# Patient Record
Sex: FEMALE | Race: WHITE | Employment: OTHER | URBAN - METROPOLITAN AREA
[De-identification: names, ages, dates, MRNs, and addresses within clinical notes are randomized per-mention and may not be internally consistent; named-entity substitution may affect disease eponyms.]

---

## 2017-04-12 DIAGNOSIS — E53.8 B12 DEFICIENCY: ICD-10-CM

## 2017-04-12 DIAGNOSIS — E78.5 HYPERLIPIDEMIA, UNSPECIFIED HYPERLIPIDEMIA TYPE: ICD-10-CM

## 2017-04-12 DIAGNOSIS — E03.9 HYPOTHYROIDISM, UNSPECIFIED TYPE: ICD-10-CM

## 2017-04-12 DIAGNOSIS — I10 ESSENTIAL HYPERTENSION, BENIGN: ICD-10-CM

## 2017-04-12 LAB
A/G RATIO: 1.7 (ref 1.1–2.2)
ALBUMIN SERPL-MCNC: 4.1 G/DL (ref 3.4–5)
ALP BLD-CCNC: 96 U/L (ref 40–129)
ALT SERPL-CCNC: 34 U/L (ref 10–40)
ANION GAP SERPL CALCULATED.3IONS-SCNC: 13 MMOL/L (ref 3–16)
AST SERPL-CCNC: 21 U/L (ref 15–37)
BILIRUB SERPL-MCNC: <0.2 MG/DL (ref 0–1)
BUN BLDV-MCNC: 27 MG/DL (ref 7–20)
CALCIUM SERPL-MCNC: 9.5 MG/DL (ref 8.3–10.6)
CHLORIDE BLD-SCNC: 106 MMOL/L (ref 99–110)
CHOLESTEROL, TOTAL: 199 MG/DL (ref 0–199)
CO2: 27 MMOL/L (ref 21–32)
CREAT SERPL-MCNC: 0.9 MG/DL (ref 0.6–1.2)
GFR AFRICAN AMERICAN: >60
GFR NON-AFRICAN AMERICAN: >60
GLOBULIN: 2.4 G/DL
GLUCOSE BLD-MCNC: 99 MG/DL (ref 70–99)
HDLC SERPL-MCNC: 53 MG/DL (ref 40–60)
LDL CHOLESTEROL CALCULATED: 118 MG/DL
POTASSIUM SERPL-SCNC: 4.5 MMOL/L (ref 3.5–5.1)
SODIUM BLD-SCNC: 146 MMOL/L (ref 136–145)
T4 FREE: 1.4 NG/DL (ref 0.9–1.8)
TOTAL PROTEIN: 6.5 G/DL (ref 6.4–8.2)
TRIGL SERPL-MCNC: 141 MG/DL (ref 0–150)
TSH REFLEX: 4.39 UIU/ML (ref 0.27–4.2)
VITAMIN B-12: 462 PG/ML (ref 211–911)
VLDLC SERPL CALC-MCNC: 28 MG/DL

## 2017-04-18 ENCOUNTER — OFFICE VISIT (OUTPATIENT)
Dept: INTERNAL MEDICINE CLINIC | Age: 72
End: 2017-04-18

## 2017-04-18 VITALS
OXYGEN SATURATION: 98 % | HEART RATE: 66 BPM | BODY MASS INDEX: 44.35 KG/M2 | HEIGHT: 62 IN | SYSTOLIC BLOOD PRESSURE: 130 MMHG | DIASTOLIC BLOOD PRESSURE: 80 MMHG | WEIGHT: 241 LBS

## 2017-04-18 DIAGNOSIS — E78.5 HYPERLIPIDEMIA, UNSPECIFIED HYPERLIPIDEMIA TYPE: ICD-10-CM

## 2017-04-18 DIAGNOSIS — E03.9 HYPOTHYROIDISM, UNSPECIFIED TYPE: ICD-10-CM

## 2017-04-18 DIAGNOSIS — E53.8 B12 DEFICIENCY: ICD-10-CM

## 2017-04-18 DIAGNOSIS — I10 ESSENTIAL HYPERTENSION, BENIGN: Primary | ICD-10-CM

## 2017-04-18 DIAGNOSIS — R53.83 FATIGUE, UNSPECIFIED TYPE: ICD-10-CM

## 2017-04-18 PROCEDURE — 3017F COLORECTAL CA SCREEN DOC REV: CPT | Performed by: INTERNAL MEDICINE

## 2017-04-18 PROCEDURE — G8399 PT W/DXA RESULTS DOCUMENT: HCPCS | Performed by: INTERNAL MEDICINE

## 2017-04-18 PROCEDURE — 3014F SCREEN MAMMO DOC REV: CPT | Performed by: INTERNAL MEDICINE

## 2017-04-18 PROCEDURE — 4040F PNEUMOC VAC/ADMIN/RCVD: CPT | Performed by: INTERNAL MEDICINE

## 2017-04-18 PROCEDURE — 1123F ACP DISCUSS/DSCN MKR DOCD: CPT | Performed by: INTERNAL MEDICINE

## 2017-04-18 PROCEDURE — 99214 OFFICE O/P EST MOD 30 MIN: CPT | Performed by: INTERNAL MEDICINE

## 2017-04-18 PROCEDURE — G8417 CALC BMI ABV UP PARAM F/U: HCPCS | Performed by: INTERNAL MEDICINE

## 2017-04-18 PROCEDURE — 1090F PRES/ABSN URINE INCON ASSESS: CPT | Performed by: INTERNAL MEDICINE

## 2017-04-18 PROCEDURE — G8427 DOCREV CUR MEDS BY ELIG CLIN: HCPCS | Performed by: INTERNAL MEDICINE

## 2017-04-18 PROCEDURE — 1036F TOBACCO NON-USER: CPT | Performed by: INTERNAL MEDICINE

## 2017-04-18 RX ORDER — LEVOTHYROXINE SODIUM 0.15 MG/1
150 TABLET ORAL DAILY
COMMUNITY
End: 2017-04-18 | Stop reason: SDUPTHER

## 2017-04-18 RX ORDER — LEVOTHYROXINE SODIUM 0.15 MG/1
150 TABLET ORAL DAILY
Qty: 30 TABLET | Refills: 2 | Status: SHIPPED | OUTPATIENT
Start: 2017-04-18 | End: 2017-04-18 | Stop reason: SDUPTHER

## 2017-04-18 ASSESSMENT — ENCOUNTER SYMPTOMS
RESPIRATORY NEGATIVE: 1
EYES NEGATIVE: 1

## 2017-04-18 ASSESSMENT — PATIENT HEALTH QUESTIONNAIRE - PHQ9
SUM OF ALL RESPONSES TO PHQ QUESTIONS 1-9: 0
1. LITTLE INTEREST OR PLEASURE IN DOING THINGS: 0
SUM OF ALL RESPONSES TO PHQ9 QUESTIONS 1 & 2: 0
2. FEELING DOWN, DEPRESSED OR HOPELESS: 0

## 2017-06-06 RX ORDER — EZETIMIBE 10 MG/1
TABLET ORAL
Qty: 90 TABLET | Refills: 3 | Status: SHIPPED | OUTPATIENT
Start: 2017-06-06 | End: 2018-06-02 | Stop reason: SDUPTHER

## 2017-06-20 DIAGNOSIS — I10 ESSENTIAL HYPERTENSION, BENIGN: ICD-10-CM

## 2017-06-20 DIAGNOSIS — E03.9 HYPOTHYROIDISM, UNSPECIFIED TYPE: ICD-10-CM

## 2017-06-20 LAB
A/G RATIO: 1.7 (ref 1.1–2.2)
ALBUMIN SERPL-MCNC: 4 G/DL (ref 3.4–5)
ALP BLD-CCNC: 100 U/L (ref 40–129)
ALT SERPL-CCNC: 18 U/L (ref 10–40)
ANION GAP SERPL CALCULATED.3IONS-SCNC: 14 MMOL/L (ref 3–16)
AST SERPL-CCNC: 12 U/L (ref 15–37)
BILIRUB SERPL-MCNC: <0.2 MG/DL (ref 0–1)
BUN BLDV-MCNC: 31 MG/DL (ref 7–20)
CALCIUM SERPL-MCNC: 9 MG/DL (ref 8.3–10.6)
CHLORIDE BLD-SCNC: 106 MMOL/L (ref 99–110)
CO2: 24 MMOL/L (ref 21–32)
CREAT SERPL-MCNC: 1.1 MG/DL (ref 0.6–1.2)
GFR AFRICAN AMERICAN: 59
GFR NON-AFRICAN AMERICAN: 49
GLOBULIN: 2.4 G/DL
GLUCOSE BLD-MCNC: 100 MG/DL (ref 70–99)
POTASSIUM SERPL-SCNC: 4.4 MMOL/L (ref 3.5–5.1)
SODIUM BLD-SCNC: 144 MMOL/L (ref 136–145)
TOTAL PROTEIN: 6.4 G/DL (ref 6.4–8.2)
TSH REFLEX: 0.89 UIU/ML (ref 0.27–4.2)

## 2017-07-25 ENCOUNTER — OFFICE VISIT (OUTPATIENT)
Dept: INTERNAL MEDICINE CLINIC | Age: 72
End: 2017-07-25

## 2017-07-25 VITALS
HEIGHT: 62 IN | BODY MASS INDEX: 43.54 KG/M2 | WEIGHT: 236.6 LBS | SYSTOLIC BLOOD PRESSURE: 152 MMHG | DIASTOLIC BLOOD PRESSURE: 92 MMHG | OXYGEN SATURATION: 98 % | HEART RATE: 69 BPM

## 2017-07-25 DIAGNOSIS — R42 DIZZY: Primary | ICD-10-CM

## 2017-07-25 DIAGNOSIS — N39.0 URINARY TRACT INFECTION WITHOUT HEMATURIA, SITE UNSPECIFIED: ICD-10-CM

## 2017-07-25 DIAGNOSIS — I10 ESSENTIAL HYPERTENSION, BENIGN: ICD-10-CM

## 2017-07-25 DIAGNOSIS — R35.0 FREQUENCY OF URINATION: ICD-10-CM

## 2017-07-25 LAB
BILIRUBIN, POC: NORMAL
BLOOD URINE, POC: NORMAL
CLARITY, POC: NORMAL
COLOR, POC: NORMAL
GLUCOSE URINE, POC: NORMAL
KETONES, POC: NORMAL
LEUKOCYTE EST, POC: NORMAL
NITRITE, POC: NORMAL
PH, POC: 6
PROTEIN, POC: NORMAL
SPECIFIC GRAVITY, POC: 1.01
UROBILINOGEN, POC: NORMAL

## 2017-07-25 PROCEDURE — 1123F ACP DISCUSS/DSCN MKR DOCD: CPT | Performed by: INTERNAL MEDICINE

## 2017-07-25 PROCEDURE — 1090F PRES/ABSN URINE INCON ASSESS: CPT | Performed by: INTERNAL MEDICINE

## 2017-07-25 PROCEDURE — G8399 PT W/DXA RESULTS DOCUMENT: HCPCS | Performed by: INTERNAL MEDICINE

## 2017-07-25 PROCEDURE — G8428 CUR MEDS NOT DOCUMENT: HCPCS | Performed by: INTERNAL MEDICINE

## 2017-07-25 PROCEDURE — 3014F SCREEN MAMMO DOC REV: CPT | Performed by: INTERNAL MEDICINE

## 2017-07-25 PROCEDURE — 81002 URINALYSIS NONAUTO W/O SCOPE: CPT | Performed by: INTERNAL MEDICINE

## 2017-07-25 PROCEDURE — 99214 OFFICE O/P EST MOD 30 MIN: CPT | Performed by: INTERNAL MEDICINE

## 2017-07-25 PROCEDURE — G8417 CALC BMI ABV UP PARAM F/U: HCPCS | Performed by: INTERNAL MEDICINE

## 2017-07-25 PROCEDURE — 4040F PNEUMOC VAC/ADMIN/RCVD: CPT | Performed by: INTERNAL MEDICINE

## 2017-07-25 PROCEDURE — 3017F COLORECTAL CA SCREEN DOC REV: CPT | Performed by: INTERNAL MEDICINE

## 2017-07-25 PROCEDURE — 1036F TOBACCO NON-USER: CPT | Performed by: INTERNAL MEDICINE

## 2017-07-25 RX ORDER — DIAZEPAM 2 MG/1
TABLET ORAL
Qty: 5 TABLET | Refills: 0 | Status: SHIPPED | OUTPATIENT
Start: 2017-07-25 | End: 2017-08-15

## 2017-07-25 RX ORDER — NITROFURANTOIN 25; 75 MG/1; MG/1
100 CAPSULE ORAL 2 TIMES DAILY
Qty: 10 CAPSULE | Refills: 0 | Status: SHIPPED | OUTPATIENT
Start: 2017-07-25 | End: 2017-07-30

## 2017-07-25 ASSESSMENT — ENCOUNTER SYMPTOMS
RESPIRATORY NEGATIVE: 1
EYES NEGATIVE: 1

## 2017-07-26 ENCOUNTER — TELEPHONE (OUTPATIENT)
Dept: INTERNAL MEDICINE CLINIC | Age: 72
End: 2017-07-26

## 2017-07-27 ENCOUNTER — TELEPHONE (OUTPATIENT)
Dept: INTERNAL MEDICINE CLINIC | Age: 72
End: 2017-07-27

## 2017-07-27 LAB
ORGANISM: ABNORMAL
URINE CULTURE, ROUTINE: ABNORMAL

## 2017-08-10 DIAGNOSIS — E03.9 HYPOTHYROIDISM, UNSPECIFIED TYPE: ICD-10-CM

## 2017-08-10 DIAGNOSIS — I10 ESSENTIAL HYPERTENSION, BENIGN: Primary | ICD-10-CM

## 2017-08-10 DIAGNOSIS — I10 ESSENTIAL HYPERTENSION, BENIGN: ICD-10-CM

## 2017-08-10 LAB
A/G RATIO: 1.5 (ref 1.1–2.2)
ALBUMIN SERPL-MCNC: 4 G/DL (ref 3.4–5)
ALP BLD-CCNC: 105 U/L (ref 40–129)
ALT SERPL-CCNC: 18 U/L (ref 10–40)
ANION GAP SERPL CALCULATED.3IONS-SCNC: 14 MMOL/L (ref 3–16)
AST SERPL-CCNC: 12 U/L (ref 15–37)
BILIRUB SERPL-MCNC: <0.2 MG/DL (ref 0–1)
BUN BLDV-MCNC: 23 MG/DL (ref 7–20)
CALCIUM SERPL-MCNC: 9.6 MG/DL (ref 8.3–10.6)
CHLORIDE BLD-SCNC: 98 MMOL/L (ref 99–110)
CO2: 26 MMOL/L (ref 21–32)
CREAT SERPL-MCNC: 0.9 MG/DL (ref 0.6–1.2)
GFR AFRICAN AMERICAN: >60
GFR NON-AFRICAN AMERICAN: >60
GLOBULIN: 2.6 G/DL
GLUCOSE BLD-MCNC: 104 MG/DL (ref 70–99)
POTASSIUM SERPL-SCNC: 4.1 MMOL/L (ref 3.5–5.1)
SODIUM BLD-SCNC: 138 MMOL/L (ref 136–145)
TOTAL PROTEIN: 6.6 G/DL (ref 6.4–8.2)
TSH REFLEX: 0.7 UIU/ML (ref 0.27–4.2)

## 2017-08-11 ENCOUNTER — TELEPHONE (OUTPATIENT)
Dept: INTERNAL MEDICINE CLINIC | Age: 72
End: 2017-08-11

## 2017-08-15 ENCOUNTER — OFFICE VISIT (OUTPATIENT)
Dept: INTERNAL MEDICINE CLINIC | Age: 72
End: 2017-08-15

## 2017-08-15 VITALS
HEART RATE: 64 BPM | TEMPERATURE: 98.4 F | BODY MASS INDEX: 44.63 KG/M2 | WEIGHT: 236.4 LBS | SYSTOLIC BLOOD PRESSURE: 142 MMHG | DIASTOLIC BLOOD PRESSURE: 72 MMHG | RESPIRATION RATE: 16 BRPM | HEIGHT: 61 IN | OXYGEN SATURATION: 96 %

## 2017-08-15 DIAGNOSIS — E78.5 HYPERLIPIDEMIA, UNSPECIFIED HYPERLIPIDEMIA TYPE: ICD-10-CM

## 2017-08-15 DIAGNOSIS — J45.21 MILD INTERMITTENT ASTHMA WITH ACUTE EXACERBATION: ICD-10-CM

## 2017-08-15 DIAGNOSIS — Z23 NEEDS FLU SHOT: ICD-10-CM

## 2017-08-15 DIAGNOSIS — E03.9 HYPOTHYROIDISM, UNSPECIFIED TYPE: ICD-10-CM

## 2017-08-15 DIAGNOSIS — I10 ESSENTIAL HYPERTENSION, BENIGN: Primary | ICD-10-CM

## 2017-08-15 DIAGNOSIS — E53.8 B12 DEFICIENCY: ICD-10-CM

## 2017-08-15 PROCEDURE — 1036F TOBACCO NON-USER: CPT | Performed by: INTERNAL MEDICINE

## 2017-08-15 PROCEDURE — G8417 CALC BMI ABV UP PARAM F/U: HCPCS | Performed by: INTERNAL MEDICINE

## 2017-08-15 PROCEDURE — G8399 PT W/DXA RESULTS DOCUMENT: HCPCS | Performed by: INTERNAL MEDICINE

## 2017-08-15 PROCEDURE — 3017F COLORECTAL CA SCREEN DOC REV: CPT | Performed by: INTERNAL MEDICINE

## 2017-08-15 PROCEDURE — 90662 IIV NO PRSV INCREASED AG IM: CPT | Performed by: INTERNAL MEDICINE

## 2017-08-15 PROCEDURE — 99214 OFFICE O/P EST MOD 30 MIN: CPT | Performed by: INTERNAL MEDICINE

## 2017-08-15 PROCEDURE — 4040F PNEUMOC VAC/ADMIN/RCVD: CPT | Performed by: INTERNAL MEDICINE

## 2017-08-15 PROCEDURE — 1123F ACP DISCUSS/DSCN MKR DOCD: CPT | Performed by: INTERNAL MEDICINE

## 2017-08-15 PROCEDURE — 1090F PRES/ABSN URINE INCON ASSESS: CPT | Performed by: INTERNAL MEDICINE

## 2017-08-15 PROCEDURE — G8427 DOCREV CUR MEDS BY ELIG CLIN: HCPCS | Performed by: INTERNAL MEDICINE

## 2017-08-15 PROCEDURE — 3014F SCREEN MAMMO DOC REV: CPT | Performed by: INTERNAL MEDICINE

## 2017-08-15 PROCEDURE — G0008 ADMIN INFLUENZA VIRUS VAC: HCPCS | Performed by: INTERNAL MEDICINE

## 2017-08-15 ASSESSMENT — ENCOUNTER SYMPTOMS
EYES NEGATIVE: 1
RESPIRATORY NEGATIVE: 1

## 2017-09-27 ENCOUNTER — HOSPITAL ENCOUNTER (OUTPATIENT)
Dept: WOMENS IMAGING | Age: 72
Discharge: HOME | End: 2017-09-27
Attending: OBSTETRICS & GYNECOLOGY | Admitting: OBSTETRICS & GYNECOLOGY

## 2017-09-27 DIAGNOSIS — Z12.31 VISIT FOR SCREENING MAMMOGRAM: ICD-10-CM

## 2017-12-06 ENCOUNTER — OFFICE VISIT (OUTPATIENT)
Dept: INTERNAL MEDICINE CLINIC | Age: 72
End: 2017-12-06

## 2017-12-06 VITALS
DIASTOLIC BLOOD PRESSURE: 96 MMHG | TEMPERATURE: 97.8 F | SYSTOLIC BLOOD PRESSURE: 144 MMHG | HEART RATE: 66 BPM | BODY MASS INDEX: 45.27 KG/M2 | OXYGEN SATURATION: 98 % | WEIGHT: 239.8 LBS | HEIGHT: 61 IN

## 2017-12-06 DIAGNOSIS — I10 ESSENTIAL HYPERTENSION, BENIGN: ICD-10-CM

## 2017-12-06 DIAGNOSIS — E03.9 HYPOTHYROIDISM, UNSPECIFIED TYPE: ICD-10-CM

## 2017-12-06 DIAGNOSIS — J40 BRONCHITIS: Primary | ICD-10-CM

## 2017-12-06 PROCEDURE — 4040F PNEUMOC VAC/ADMIN/RCVD: CPT | Performed by: INTERNAL MEDICINE

## 2017-12-06 PROCEDURE — 1036F TOBACCO NON-USER: CPT | Performed by: INTERNAL MEDICINE

## 2017-12-06 PROCEDURE — 99213 OFFICE O/P EST LOW 20 MIN: CPT | Performed by: INTERNAL MEDICINE

## 2017-12-06 PROCEDURE — 3014F SCREEN MAMMO DOC REV: CPT | Performed by: INTERNAL MEDICINE

## 2017-12-06 PROCEDURE — 1090F PRES/ABSN URINE INCON ASSESS: CPT | Performed by: INTERNAL MEDICINE

## 2017-12-06 PROCEDURE — G8399 PT W/DXA RESULTS DOCUMENT: HCPCS | Performed by: INTERNAL MEDICINE

## 2017-12-06 PROCEDURE — G8427 DOCREV CUR MEDS BY ELIG CLIN: HCPCS | Performed by: INTERNAL MEDICINE

## 2017-12-06 PROCEDURE — 3017F COLORECTAL CA SCREEN DOC REV: CPT | Performed by: INTERNAL MEDICINE

## 2017-12-06 PROCEDURE — G8417 CALC BMI ABV UP PARAM F/U: HCPCS | Performed by: INTERNAL MEDICINE

## 2017-12-06 PROCEDURE — G8484 FLU IMMUNIZE NO ADMIN: HCPCS | Performed by: INTERNAL MEDICINE

## 2017-12-06 PROCEDURE — 1123F ACP DISCUSS/DSCN MKR DOCD: CPT | Performed by: INTERNAL MEDICINE

## 2017-12-06 RX ORDER — CEFUROXIME AXETIL 250 MG/1
250 TABLET ORAL 2 TIMES DAILY
Qty: 20 TABLET | Refills: 0 | Status: SHIPPED | OUTPATIENT
Start: 2017-12-06 | End: 2017-12-16

## 2017-12-06 RX ORDER — METHYLPREDNISOLONE 4 MG/1
TABLET ORAL
Qty: 1 KIT | Refills: 0 | Status: SHIPPED | OUTPATIENT
Start: 2017-12-06 | End: 2018-04-06 | Stop reason: ALTCHOICE

## 2017-12-06 ASSESSMENT — ENCOUNTER SYMPTOMS
SHORTNESS OF BREATH: 1
COUGH: 1
SORE THROAT: 1
EYES NEGATIVE: 1
VOICE CHANGE: 1
WHEEZING: 1

## 2017-12-06 NOTE — PROGRESS NOTES
Subjective:      Patient ID: Amauri Hutchinson is a 67 y.o. female. HPI  C/o  Chest congestion  X 4 days  Taking OTC   meds      Has a cough and sore throat  No fevers, chills,rigors  Or other signs of systemic illness   Besides what  is already noted. Yellow   Sputum    Some sinus congestion    Lots of coughing    Mild sob  With this     Review of Systems   Constitutional: Positive for fatigue. HENT: Positive for congestion, sore throat and voice change. Eyes: Negative. Respiratory: Positive for cough, shortness of breath and wheezing. Objective:   Physical Exam   Constitutional: She appears well-developed and well-nourished. HENT:   Head: Normocephalic and atraumatic. Eyes: Conjunctivae and EOM are normal. Pupils are equal, round, and reactive to light. Cardiovascular: Normal rate, regular rhythm and normal heart sounds. No murmur heard. Pulmonary/Chest: Effort normal. No respiratory distress. She has wheezes. She has no rales. Musculoskeletal: She exhibits no edema. Vitals reviewed. Assessment:      Robinson Green was seen today for chest congestion. Diagnoses and all orders for this visit:    Bronchitis  -     methylPREDNISolone (MEDROL, CHENTE,) 4 MG tablet; Take by mouth. -     cefUROXime (CEFTIN) 250 MG tablet; Take 1 tablet by mouth 2 times daily for 10 days          Cont inhalers   Essential hypertension, benign      Too high    Will recheck   In 2 weeks    Hypothyroidism, unspecified type     Well controlled, continue meds              Plan: Trice Joyce

## 2017-12-15 DIAGNOSIS — E78.5 HYPERLIPIDEMIA, UNSPECIFIED HYPERLIPIDEMIA TYPE: ICD-10-CM

## 2017-12-15 DIAGNOSIS — I10 ESSENTIAL HYPERTENSION, BENIGN: ICD-10-CM

## 2017-12-15 DIAGNOSIS — E53.8 B12 DEFICIENCY: ICD-10-CM

## 2017-12-15 DIAGNOSIS — E03.9 HYPOTHYROIDISM, UNSPECIFIED TYPE: ICD-10-CM

## 2017-12-15 LAB
A/G RATIO: 1.8 (ref 1.1–2.2)
ALBUMIN SERPL-MCNC: 3.9 G/DL (ref 3.4–5)
ALP BLD-CCNC: 109 U/L (ref 40–129)
ALT SERPL-CCNC: 19 U/L (ref 10–40)
ANION GAP SERPL CALCULATED.3IONS-SCNC: 11 MMOL/L (ref 3–16)
AST SERPL-CCNC: 12 U/L (ref 15–37)
BILIRUB SERPL-MCNC: 0.4 MG/DL (ref 0–1)
BUN BLDV-MCNC: 24 MG/DL (ref 7–20)
CALCIUM SERPL-MCNC: 9.1 MG/DL (ref 8.3–10.6)
CHLORIDE BLD-SCNC: 103 MMOL/L (ref 99–110)
CHOLESTEROL, TOTAL: 172 MG/DL (ref 0–199)
CO2: 27 MMOL/L (ref 21–32)
CREAT SERPL-MCNC: 0.7 MG/DL (ref 0.6–1.2)
GFR AFRICAN AMERICAN: >60
GFR NON-AFRICAN AMERICAN: >60
GLOBULIN: 2.2 G/DL
GLUCOSE BLD-MCNC: 99 MG/DL (ref 70–99)
HDLC SERPL-MCNC: 61 MG/DL (ref 40–60)
LDL CHOLESTEROL CALCULATED: 91 MG/DL
POTASSIUM SERPL-SCNC: 4.3 MMOL/L (ref 3.5–5.1)
SODIUM BLD-SCNC: 141 MMOL/L (ref 136–145)
TOTAL PROTEIN: 6.1 G/DL (ref 6.4–8.2)
TRIGL SERPL-MCNC: 102 MG/DL (ref 0–150)
VITAMIN B-12: 489 PG/ML (ref 211–911)
VLDLC SERPL CALC-MCNC: 20 MG/DL

## 2017-12-18 ENCOUNTER — TELEPHONE (OUTPATIENT)
Dept: INTERNAL MEDICINE CLINIC | Age: 72
End: 2017-12-18

## 2018-04-06 ENCOUNTER — OFFICE VISIT (OUTPATIENT)
Dept: INTERNAL MEDICINE CLINIC | Age: 73
End: 2018-04-06

## 2018-04-06 VITALS
DIASTOLIC BLOOD PRESSURE: 78 MMHG | OXYGEN SATURATION: 96 % | SYSTOLIC BLOOD PRESSURE: 136 MMHG | HEIGHT: 61 IN | BODY MASS INDEX: 45.31 KG/M2 | HEART RATE: 73 BPM | WEIGHT: 240 LBS | TEMPERATURE: 97.6 F

## 2018-04-06 DIAGNOSIS — E78.5 HYPERLIPIDEMIA, UNSPECIFIED HYPERLIPIDEMIA TYPE: ICD-10-CM

## 2018-04-06 DIAGNOSIS — E03.9 HYPOTHYROIDISM, UNSPECIFIED TYPE: ICD-10-CM

## 2018-04-06 DIAGNOSIS — I10 ESSENTIAL HYPERTENSION, BENIGN: Primary | ICD-10-CM

## 2018-04-06 DIAGNOSIS — E53.8 B12 DEFICIENCY: ICD-10-CM

## 2018-04-06 DIAGNOSIS — M17.12 OSTEOARTHRITIS OF LEFT KNEE, UNSPECIFIED OSTEOARTHRITIS TYPE: ICD-10-CM

## 2018-04-06 LAB
ANION GAP SERPL CALCULATED.3IONS-SCNC: 17 MMOL/L (ref 3–16)
BUN BLDV-MCNC: 24 MG/DL (ref 7–20)
CALCIUM SERPL-MCNC: 9.2 MG/DL (ref 8.3–10.6)
CHLORIDE BLD-SCNC: 101 MMOL/L (ref 99–110)
CO2: 27 MMOL/L (ref 21–32)
CREAT SERPL-MCNC: 0.9 MG/DL (ref 0.6–1.2)
GFR AFRICAN AMERICAN: >60
GFR NON-AFRICAN AMERICAN: >60
GLUCOSE BLD-MCNC: 97 MG/DL (ref 70–99)
POTASSIUM SERPL-SCNC: 4.1 MMOL/L (ref 3.5–5.1)
SODIUM BLD-SCNC: 145 MMOL/L (ref 136–145)
TSH REFLEX: 1.28 UIU/ML (ref 0.27–4.2)

## 2018-04-06 PROCEDURE — G8399 PT W/DXA RESULTS DOCUMENT: HCPCS | Performed by: INTERNAL MEDICINE

## 2018-04-06 PROCEDURE — 1036F TOBACCO NON-USER: CPT | Performed by: INTERNAL MEDICINE

## 2018-04-06 PROCEDURE — 4040F PNEUMOC VAC/ADMIN/RCVD: CPT | Performed by: INTERNAL MEDICINE

## 2018-04-06 PROCEDURE — G8417 CALC BMI ABV UP PARAM F/U: HCPCS | Performed by: INTERNAL MEDICINE

## 2018-04-06 PROCEDURE — 3017F COLORECTAL CA SCREEN DOC REV: CPT | Performed by: INTERNAL MEDICINE

## 2018-04-06 PROCEDURE — 99214 OFFICE O/P EST MOD 30 MIN: CPT | Performed by: INTERNAL MEDICINE

## 2018-04-06 PROCEDURE — 1090F PRES/ABSN URINE INCON ASSESS: CPT | Performed by: INTERNAL MEDICINE

## 2018-04-06 PROCEDURE — 3014F SCREEN MAMMO DOC REV: CPT | Performed by: INTERNAL MEDICINE

## 2018-04-06 PROCEDURE — 1123F ACP DISCUSS/DSCN MKR DOCD: CPT | Performed by: INTERNAL MEDICINE

## 2018-04-06 PROCEDURE — G8427 DOCREV CUR MEDS BY ELIG CLIN: HCPCS | Performed by: INTERNAL MEDICINE

## 2018-04-06 ASSESSMENT — ENCOUNTER SYMPTOMS
RESPIRATORY NEGATIVE: 1
GASTROINTESTINAL NEGATIVE: 1
EYES NEGATIVE: 1

## 2018-04-09 ENCOUNTER — TELEPHONE (OUTPATIENT)
Dept: INTERNAL MEDICINE CLINIC | Age: 73
End: 2018-04-09

## 2018-04-17 ENCOUNTER — OFFICE VISIT (OUTPATIENT)
Dept: INTERNAL MEDICINE CLINIC | Age: 73
End: 2018-04-17

## 2018-04-17 VITALS
SYSTOLIC BLOOD PRESSURE: 138 MMHG | DIASTOLIC BLOOD PRESSURE: 84 MMHG | HEIGHT: 61 IN | TEMPERATURE: 98.7 F | OXYGEN SATURATION: 97 % | HEART RATE: 88 BPM

## 2018-04-17 DIAGNOSIS — J40 BRONCHITIS: Primary | ICD-10-CM

## 2018-04-17 DIAGNOSIS — I10 ESSENTIAL HYPERTENSION, BENIGN: ICD-10-CM

## 2018-04-17 DIAGNOSIS — J45.20 MILD INTERMITTENT ASTHMA WITHOUT COMPLICATION: ICD-10-CM

## 2018-04-17 PROCEDURE — G8399 PT W/DXA RESULTS DOCUMENT: HCPCS | Performed by: INTERNAL MEDICINE

## 2018-04-17 PROCEDURE — 3017F COLORECTAL CA SCREEN DOC REV: CPT | Performed by: INTERNAL MEDICINE

## 2018-04-17 PROCEDURE — 1090F PRES/ABSN URINE INCON ASSESS: CPT | Performed by: INTERNAL MEDICINE

## 2018-04-17 PROCEDURE — 1036F TOBACCO NON-USER: CPT | Performed by: INTERNAL MEDICINE

## 2018-04-17 PROCEDURE — G8417 CALC BMI ABV UP PARAM F/U: HCPCS | Performed by: INTERNAL MEDICINE

## 2018-04-17 PROCEDURE — 99213 OFFICE O/P EST LOW 20 MIN: CPT | Performed by: INTERNAL MEDICINE

## 2018-04-17 PROCEDURE — 4040F PNEUMOC VAC/ADMIN/RCVD: CPT | Performed by: INTERNAL MEDICINE

## 2018-04-17 PROCEDURE — G8427 DOCREV CUR MEDS BY ELIG CLIN: HCPCS | Performed by: INTERNAL MEDICINE

## 2018-04-17 PROCEDURE — 1123F ACP DISCUSS/DSCN MKR DOCD: CPT | Performed by: INTERNAL MEDICINE

## 2018-04-17 PROCEDURE — 3014F SCREEN MAMMO DOC REV: CPT | Performed by: INTERNAL MEDICINE

## 2018-04-17 RX ORDER — ALBUTEROL SULFATE 90 UG/1
2 AEROSOL, METERED RESPIRATORY (INHALATION) EVERY 6 HOURS PRN
Qty: 1 INHALER | Refills: 4 | Status: SHIPPED | OUTPATIENT
Start: 2018-04-17 | End: 2019-07-15 | Stop reason: SDUPTHER

## 2018-04-17 RX ORDER — CEFUROXIME AXETIL 250 MG/1
250 TABLET ORAL 2 TIMES DAILY
Qty: 20 TABLET | Refills: 0 | Status: SHIPPED | OUTPATIENT
Start: 2018-04-17 | End: 2018-04-27

## 2018-04-17 RX ORDER — PREDNISONE 10 MG/1
TABLET ORAL
Qty: 18 TABLET | Refills: 0 | Status: ON HOLD | OUTPATIENT
Start: 2018-04-17 | End: 2018-04-22 | Stop reason: HOSPADM

## 2018-04-17 ASSESSMENT — ENCOUNTER SYMPTOMS
WHEEZING: 1
EYES NEGATIVE: 1
COUGH: 1

## 2018-04-20 ENCOUNTER — TELEPHONE (OUTPATIENT)
Dept: INTERNAL MEDICINE CLINIC | Age: 73
End: 2018-04-20

## 2018-04-20 PROBLEM — J44.1 COPD EXACERBATION (HCC): Status: ACTIVE | Noted: 2018-04-20

## 2018-04-20 PROBLEM — J45.901 ASTHMA EXACERBATION: Status: ACTIVE | Noted: 2018-04-20

## 2018-04-20 PROBLEM — E66.01 MORBID OBESITY DUE TO EXCESS CALORIES (HCC): Status: ACTIVE | Noted: 2018-04-20

## 2018-04-23 ENCOUNTER — CARE COORDINATION (OUTPATIENT)
Dept: CASE MANAGEMENT | Age: 73
End: 2018-04-23

## 2018-04-23 DIAGNOSIS — J45.901 MODERATE ASTHMA WITH ACUTE EXACERBATION, UNSPECIFIED WHETHER PERSISTENT: Primary | ICD-10-CM

## 2018-04-23 DIAGNOSIS — E66.01 MORBID OBESITY DUE TO EXCESS CALORIES (HCC): Primary | ICD-10-CM

## 2018-04-23 PROCEDURE — 1111F DSCHRG MED/CURRENT MED MERGE: CPT

## 2018-04-24 ENCOUNTER — CARE COORDINATION (OUTPATIENT)
Dept: CASE MANAGEMENT | Age: 73
End: 2018-04-24

## 2018-04-24 ENCOUNTER — HOSPITAL ENCOUNTER (OUTPATIENT)
Dept: PULMONOLOGY | Age: 73
Discharge: HOME | End: 2018-05-08
Attending: INTERNAL MEDICINE | Admitting: INTERNAL MEDICINE

## 2018-04-24 ENCOUNTER — OFFICE VISIT (OUTPATIENT)
Dept: INTERNAL MEDICINE CLINIC | Age: 73
End: 2018-04-24

## 2018-04-24 VITALS
SYSTOLIC BLOOD PRESSURE: 150 MMHG | OXYGEN SATURATION: 96 % | DIASTOLIC BLOOD PRESSURE: 80 MMHG | HEIGHT: 62 IN | WEIGHT: 238.6 LBS | TEMPERATURE: 97.8 F | BODY MASS INDEX: 43.91 KG/M2 | HEART RATE: 70 BPM

## 2018-04-24 DIAGNOSIS — E78.5 HYPERLIPIDEMIA, UNSPECIFIED HYPERLIPIDEMIA TYPE: ICD-10-CM

## 2018-04-24 DIAGNOSIS — F41.9 ANXIETY: ICD-10-CM

## 2018-04-24 DIAGNOSIS — E03.9 HYPOTHYROIDISM, UNSPECIFIED TYPE: ICD-10-CM

## 2018-04-24 DIAGNOSIS — J45.41 MODERATE PERSISTENT ASTHMA WITH EXACERBATION: Primary | ICD-10-CM

## 2018-04-24 DIAGNOSIS — I10 ESSENTIAL HYPERTENSION, BENIGN: ICD-10-CM

## 2018-04-24 DIAGNOSIS — J45.901 ASTHMA WITH ACUTE EXACERBATION: ICD-10-CM

## 2018-04-24 PROCEDURE — 4040F PNEUMOC VAC/ADMIN/RCVD: CPT | Performed by: INTERNAL MEDICINE

## 2018-04-24 PROCEDURE — G8399 PT W/DXA RESULTS DOCUMENT: HCPCS | Performed by: INTERNAL MEDICINE

## 2018-04-24 PROCEDURE — G8427 DOCREV CUR MEDS BY ELIG CLIN: HCPCS | Performed by: INTERNAL MEDICINE

## 2018-04-24 PROCEDURE — 1036F TOBACCO NON-USER: CPT | Performed by: INTERNAL MEDICINE

## 2018-04-24 PROCEDURE — G8417 CALC BMI ABV UP PARAM F/U: HCPCS | Performed by: INTERNAL MEDICINE

## 2018-04-24 PROCEDURE — 1090F PRES/ABSN URINE INCON ASSESS: CPT | Performed by: INTERNAL MEDICINE

## 2018-04-24 PROCEDURE — 99214 OFFICE O/P EST MOD 30 MIN: CPT | Performed by: INTERNAL MEDICINE

## 2018-04-24 PROCEDURE — 1123F ACP DISCUSS/DSCN MKR DOCD: CPT | Performed by: INTERNAL MEDICINE

## 2018-04-24 PROCEDURE — 3017F COLORECTAL CA SCREEN DOC REV: CPT | Performed by: INTERNAL MEDICINE

## 2018-04-24 PROCEDURE — 1111F DSCHRG MED/CURRENT MED MERGE: CPT | Performed by: INTERNAL MEDICINE

## 2018-04-24 ASSESSMENT — ENCOUNTER SYMPTOMS
COUGH: 1
WHEEZING: 1
EYES NEGATIVE: 1

## 2018-05-01 ENCOUNTER — OFFICE VISIT (OUTPATIENT)
Dept: INTERNAL MEDICINE CLINIC | Age: 73
End: 2018-05-01

## 2018-05-01 VITALS
HEART RATE: 74 BPM | SYSTOLIC BLOOD PRESSURE: 144 MMHG | DIASTOLIC BLOOD PRESSURE: 78 MMHG | OXYGEN SATURATION: 96 % | WEIGHT: 246 LBS | BODY MASS INDEX: 46.44 KG/M2 | RESPIRATION RATE: 18 BRPM | HEIGHT: 61 IN

## 2018-05-01 DIAGNOSIS — R06.02 SHORTNESS OF BREATH: Primary | ICD-10-CM

## 2018-05-01 DIAGNOSIS — J45.901 MODERATE ASTHMA WITH ACUTE EXACERBATION, UNSPECIFIED WHETHER PERSISTENT: ICD-10-CM

## 2018-05-01 DIAGNOSIS — I10 ESSENTIAL HYPERTENSION, BENIGN: ICD-10-CM

## 2018-05-01 DIAGNOSIS — F41.9 ANXIETY: ICD-10-CM

## 2018-05-01 PROCEDURE — 1090F PRES/ABSN URINE INCON ASSESS: CPT | Performed by: INTERNAL MEDICINE

## 2018-05-01 PROCEDURE — 1111F DSCHRG MED/CURRENT MED MERGE: CPT | Performed by: INTERNAL MEDICINE

## 2018-05-01 PROCEDURE — G8427 DOCREV CUR MEDS BY ELIG CLIN: HCPCS | Performed by: INTERNAL MEDICINE

## 2018-05-01 PROCEDURE — 1123F ACP DISCUSS/DSCN MKR DOCD: CPT | Performed by: INTERNAL MEDICINE

## 2018-05-01 PROCEDURE — G8417 CALC BMI ABV UP PARAM F/U: HCPCS | Performed by: INTERNAL MEDICINE

## 2018-05-01 PROCEDURE — 1036F TOBACCO NON-USER: CPT | Performed by: INTERNAL MEDICINE

## 2018-05-01 PROCEDURE — G8399 PT W/DXA RESULTS DOCUMENT: HCPCS | Performed by: INTERNAL MEDICINE

## 2018-05-01 PROCEDURE — 4040F PNEUMOC VAC/ADMIN/RCVD: CPT | Performed by: INTERNAL MEDICINE

## 2018-05-01 PROCEDURE — 3017F COLORECTAL CA SCREEN DOC REV: CPT | Performed by: INTERNAL MEDICINE

## 2018-05-01 PROCEDURE — 99213 OFFICE O/P EST LOW 20 MIN: CPT | Performed by: INTERNAL MEDICINE

## 2018-05-01 ASSESSMENT — ENCOUNTER SYMPTOMS
SHORTNESS OF BREATH: 1
COUGH: 1
EYES NEGATIVE: 1
ALLERGIC/IMMUNOLOGIC NEGATIVE: 1

## 2018-05-01 ASSESSMENT — PATIENT HEALTH QUESTIONNAIRE - PHQ9
SUM OF ALL RESPONSES TO PHQ QUESTIONS 1-9: 0
1. LITTLE INTEREST OR PLEASURE IN DOING THINGS: 0
2. FEELING DOWN, DEPRESSED OR HOPELESS: 0
SUM OF ALL RESPONSES TO PHQ9 QUESTIONS 1 & 2: 0

## 2018-05-04 ENCOUNTER — CARE COORDINATION (OUTPATIENT)
Dept: CASE MANAGEMENT | Age: 73
End: 2018-05-04

## 2018-05-10 ENCOUNTER — HOSPITAL ENCOUNTER (OUTPATIENT)
Dept: PULMONOLOGY | Age: 73
Discharge: HOME | End: 2018-05-10
Attending: INTERNAL MEDICINE | Admitting: INTERNAL MEDICINE

## 2018-05-10 DIAGNOSIS — J45.901 ASTHMA WITH ACUTE EXACERBATION: ICD-10-CM

## 2018-05-10 LAB
DLCO %PRED: NORMAL
DLCO PRE: NORMAL
FEF 25-75%-POST: NORMAL
FEF 25-75%-PRE: NORMAL
FEV1-POST: NORMAL
FEV1-PRE: NORMAL
FEV1/FVC-POST: NORMAL
FEV1/FVC-PRE: NORMAL
FVC-POST: NORMAL
FVC-PRE: NORMAL
MEP: NORMAL
MIP: NORMAL
MVV %PRED-PRE: NORMAL
MVV-PRE: NORMAL
TLC %PRED: NORMAL
TLC PRE: NORMAL

## 2018-05-10 PROCEDURE — 94729 DIFFUSING CAPACITY: CPT | Performed by: INTERNAL MEDICINE

## 2018-05-10 PROCEDURE — 94727 GAS DIL/WSHOT DETER LNG VOL: CPT | Performed by: INTERNAL MEDICINE

## 2018-05-10 PROCEDURE — 94060 EVALUATION OF WHEEZING: CPT | Performed by: INTERNAL MEDICINE

## 2018-05-10 RX ORDER — ALBUTEROL SULFATE 90 UG/1
4 AEROSOL, METERED RESPIRATORY (INHALATION) ONCE
Status: COMPLETED | OUTPATIENT
Start: 2018-05-10 | End: 2018-05-10

## 2018-05-10 RX ADMIN — ALBUTEROL SULFATE 4 PUFF: 90 AEROSOL, METERED RESPIRATORY (INHALATION) at 09:44

## 2018-05-15 ENCOUNTER — OFFICE VISIT (OUTPATIENT)
Dept: PULMONOLOGY | Age: 73
End: 2018-05-15

## 2018-05-15 VITALS
SYSTOLIC BLOOD PRESSURE: 142 MMHG | OXYGEN SATURATION: 96 % | TEMPERATURE: 97.5 F | BODY MASS INDEX: 45.31 KG/M2 | WEIGHT: 240 LBS | DIASTOLIC BLOOD PRESSURE: 80 MMHG | HEIGHT: 61 IN | HEART RATE: 70 BPM | RESPIRATION RATE: 16 BRPM

## 2018-05-15 DIAGNOSIS — J30.89 OTHER ALLERGIC RHINITIS: ICD-10-CM

## 2018-05-15 DIAGNOSIS — J45.30 MILD PERSISTENT ASTHMA WITHOUT COMPLICATION: Primary | ICD-10-CM

## 2018-05-15 PROCEDURE — 1123F ACP DISCUSS/DSCN MKR DOCD: CPT | Performed by: INTERNAL MEDICINE

## 2018-05-15 PROCEDURE — 1090F PRES/ABSN URINE INCON ASSESS: CPT | Performed by: INTERNAL MEDICINE

## 2018-05-15 PROCEDURE — 1036F TOBACCO NON-USER: CPT | Performed by: INTERNAL MEDICINE

## 2018-05-15 PROCEDURE — 4040F PNEUMOC VAC/ADMIN/RCVD: CPT | Performed by: INTERNAL MEDICINE

## 2018-05-15 PROCEDURE — 3017F COLORECTAL CA SCREEN DOC REV: CPT | Performed by: INTERNAL MEDICINE

## 2018-05-15 PROCEDURE — G8399 PT W/DXA RESULTS DOCUMENT: HCPCS | Performed by: INTERNAL MEDICINE

## 2018-05-15 PROCEDURE — G8417 CALC BMI ABV UP PARAM F/U: HCPCS | Performed by: INTERNAL MEDICINE

## 2018-05-15 PROCEDURE — G8427 DOCREV CUR MEDS BY ELIG CLIN: HCPCS | Performed by: INTERNAL MEDICINE

## 2018-05-15 PROCEDURE — 1111F DSCHRG MED/CURRENT MED MERGE: CPT | Performed by: INTERNAL MEDICINE

## 2018-05-15 PROCEDURE — 99204 OFFICE O/P NEW MOD 45 MIN: CPT | Performed by: INTERNAL MEDICINE

## 2018-05-15 RX ORDER — AZELASTINE 1 MG/ML
1 SPRAY, METERED NASAL 2 TIMES DAILY
Qty: 1 BOTTLE | Refills: 3 | Status: SHIPPED | OUTPATIENT
Start: 2018-05-15 | End: 2018-08-07 | Stop reason: CLARIF

## 2018-05-15 RX ORDER — FLUTICASONE PROPIONATE 50 MCG
1 SPRAY, SUSPENSION (ML) NASAL 2 TIMES DAILY
Qty: 1 BOTTLE | Refills: 3 | Status: SHIPPED
Start: 2018-05-15 | End: 2019-09-24 | Stop reason: ALTCHOICE

## 2018-05-15 RX ORDER — FLUTICASONE FUROATE AND VILANTEROL 200; 25 UG/1; UG/1
1 POWDER RESPIRATORY (INHALATION) DAILY
Qty: 1 EACH | Refills: 5 | Status: SHIPPED | OUTPATIENT
Start: 2018-05-15 | End: 2018-07-03

## 2018-05-30 DIAGNOSIS — I10 ESSENTIAL HYPERTENSION, BENIGN: ICD-10-CM

## 2018-05-30 RX ORDER — TELMISARTAN AND HYDROCHLORTHIAZIDE 80; 25 MG/1; MG/1
TABLET ORAL
Qty: 90 TABLET | Refills: 2 | Status: SHIPPED | OUTPATIENT
Start: 2018-05-30 | End: 2018-12-04 | Stop reason: SDUPTHER

## 2018-06-04 RX ORDER — EZETIMIBE 10 MG/1
TABLET ORAL
Qty: 90 TABLET | Refills: 3 | Status: SHIPPED | OUTPATIENT
Start: 2018-06-04 | End: 2019-05-23 | Stop reason: SDUPTHER

## 2018-07-02 DIAGNOSIS — E03.9 HYPOTHYROIDISM, UNSPECIFIED TYPE: ICD-10-CM

## 2018-07-02 RX ORDER — LEVOTHYROXINE SODIUM 0.15 MG/1
TABLET ORAL
Qty: 90 TABLET | Refills: 0 | Status: SHIPPED | OUTPATIENT
Start: 2018-07-02 | End: 2018-10-07 | Stop reason: SDUPTHER

## 2018-07-03 ENCOUNTER — TELEPHONE (OUTPATIENT)
Dept: PULMONOLOGY | Age: 73
End: 2018-07-03

## 2018-07-03 RX ORDER — MOMETASONE FUROATE AND FORMOTEROL FUMARATE DIHYDRATE 200; 5 UG/1; UG/1
AEROSOL RESPIRATORY (INHALATION)
Qty: 3 INHALER | Refills: 1 | Status: SHIPPED | OUTPATIENT
Start: 2018-07-03 | End: 2019-09-24 | Stop reason: SDUPTHER

## 2018-08-06 ENCOUNTER — ANTI-COAG VISIT (OUTPATIENT)
Dept: INTERNAL MEDICINE CLINIC | Age: 73
End: 2018-08-06

## 2018-08-06 ENCOUNTER — TELEPHONE (OUTPATIENT)
Dept: INTERNAL MEDICINE CLINIC | Age: 73
End: 2018-08-06

## 2018-08-06 DIAGNOSIS — Z00.00 ROUTINE GENERAL MEDICAL EXAMINATION AT A HEALTH CARE FACILITY: Primary | ICD-10-CM

## 2018-08-06 LAB
A/G RATIO: 1.8 (ref 1.1–2.2)
ALBUMIN SERPL-MCNC: 4.2 G/DL (ref 3.4–5)
ALP BLD-CCNC: 129 U/L (ref 40–129)
ALT SERPL-CCNC: 21 U/L (ref 10–40)
ANION GAP SERPL CALCULATED.3IONS-SCNC: 17 MMOL/L (ref 3–16)
AST SERPL-CCNC: 10 U/L (ref 15–37)
BILIRUB SERPL-MCNC: 0.3 MG/DL (ref 0–1)
BUN BLDV-MCNC: 32 MG/DL (ref 7–20)
CALCIUM SERPL-MCNC: 9.6 MG/DL (ref 8.3–10.6)
CHLORIDE BLD-SCNC: 103 MMOL/L (ref 99–110)
CHOLESTEROL, TOTAL: 174 MG/DL (ref 0–199)
CO2: 24 MMOL/L (ref 21–32)
CREAT SERPL-MCNC: 0.8 MG/DL (ref 0.6–1.2)
GFR AFRICAN AMERICAN: >60
GFR NON-AFRICAN AMERICAN: >60
GLOBULIN: 2.3 G/DL
GLUCOSE BLD-MCNC: 90 MG/DL (ref 70–99)
HDLC SERPL-MCNC: 55 MG/DL (ref 40–60)
LDL CHOLESTEROL CALCULATED: 100 MG/DL
POTASSIUM SERPL-SCNC: 3.8 MMOL/L (ref 3.5–5.1)
SODIUM BLD-SCNC: 144 MMOL/L (ref 136–145)
TOTAL PROTEIN: 6.5 G/DL (ref 6.4–8.2)
TRIGL SERPL-MCNC: 93 MG/DL (ref 0–150)
TSH SERPL DL<=0.05 MIU/L-ACNC: 0.4 UIU/ML (ref 0.27–4.2)
VLDLC SERPL CALC-MCNC: 19 MG/DL

## 2018-08-06 NOTE — TELEPHONE ENCOUNTER
Pt has an appt for tomorrow, she is asking if she should have blood work a head of time? She states she usually gets labs drawn before her appt but was not given any orders.        JT#[phone]

## 2018-08-07 ENCOUNTER — OFFICE VISIT (OUTPATIENT)
Dept: INTERNAL MEDICINE CLINIC | Age: 73
End: 2018-08-07

## 2018-08-07 VITALS
WEIGHT: 232.4 LBS | HEIGHT: 61 IN | RESPIRATION RATE: 12 BRPM | SYSTOLIC BLOOD PRESSURE: 136 MMHG | DIASTOLIC BLOOD PRESSURE: 70 MMHG | OXYGEN SATURATION: 98 % | BODY MASS INDEX: 43.88 KG/M2 | HEART RATE: 60 BPM

## 2018-08-07 DIAGNOSIS — I10 ESSENTIAL HYPERTENSION, BENIGN: Primary | ICD-10-CM

## 2018-08-07 DIAGNOSIS — E66.01 MORBID OBESITY WITH BMI OF 40.0-44.9, ADULT (HCC): ICD-10-CM

## 2018-08-07 DIAGNOSIS — E78.5 HYPERLIPIDEMIA, UNSPECIFIED HYPERLIPIDEMIA TYPE: ICD-10-CM

## 2018-08-07 DIAGNOSIS — J45.30 MILD PERSISTENT ASTHMA WITHOUT COMPLICATION: ICD-10-CM

## 2018-08-07 DIAGNOSIS — E03.9 HYPOTHYROIDISM, UNSPECIFIED TYPE: ICD-10-CM

## 2018-08-07 DIAGNOSIS — E66.01 MORBID OBESITY DUE TO EXCESS CALORIES (HCC): ICD-10-CM

## 2018-08-07 PROCEDURE — 1090F PRES/ABSN URINE INCON ASSESS: CPT | Performed by: INTERNAL MEDICINE

## 2018-08-07 PROCEDURE — 1036F TOBACCO NON-USER: CPT | Performed by: INTERNAL MEDICINE

## 2018-08-07 PROCEDURE — G8399 PT W/DXA RESULTS DOCUMENT: HCPCS | Performed by: INTERNAL MEDICINE

## 2018-08-07 PROCEDURE — 1123F ACP DISCUSS/DSCN MKR DOCD: CPT | Performed by: INTERNAL MEDICINE

## 2018-08-07 PROCEDURE — G8427 DOCREV CUR MEDS BY ELIG CLIN: HCPCS | Performed by: INTERNAL MEDICINE

## 2018-08-07 PROCEDURE — 99214 OFFICE O/P EST MOD 30 MIN: CPT | Performed by: INTERNAL MEDICINE

## 2018-08-07 PROCEDURE — 3017F COLORECTAL CA SCREEN DOC REV: CPT | Performed by: INTERNAL MEDICINE

## 2018-08-07 PROCEDURE — G8417 CALC BMI ABV UP PARAM F/U: HCPCS | Performed by: INTERNAL MEDICINE

## 2018-08-07 PROCEDURE — 1101F PT FALLS ASSESS-DOCD LE1/YR: CPT | Performed by: INTERNAL MEDICINE

## 2018-08-07 PROCEDURE — 4040F PNEUMOC VAC/ADMIN/RCVD: CPT | Performed by: INTERNAL MEDICINE

## 2018-08-07 ASSESSMENT — ENCOUNTER SYMPTOMS
BLURRED VISION: 0
ABDOMINAL PAIN: 0
SHORTNESS OF BREATH: 0
HEARTBURN: 0
VOMITING: 0
BACK PAIN: 0
SORE THROAT: 0
EYE PAIN: 0
DIARRHEA: 0
NAUSEA: 0
BLOOD IN STOOL: 0
WHEEZING: 0
CONSTIPATION: 0
COUGH: 0

## 2018-08-07 NOTE — PROGRESS NOTES
Gastrointestinal: Negative for abdominal pain, blood in stool, constipation, diarrhea, heartburn, nausea and vomiting. Genitourinary: Negative for dysuria, hematuria and urgency. Musculoskeletal: Negative for back pain, joint pain and myalgias. Skin: Negative for rash. Neurological: Negative for dizziness, sensory change, focal weakness, seizures, loss of consciousness, weakness and headaches. Endo/Heme/Allergies: Negative for environmental allergies. Does not bruise/bleed easily. Psychiatric/Behavioral: Negative for depression and memory loss. The patient is not nervous/anxious. Objective:   Physical Exam   Constitutional: She is oriented to person, place, and time. She appears well-developed and well-nourished. HENT:   Head: Normocephalic and atraumatic. Eyes: Conjunctivae and EOM are normal. Pupils are equal, round, and reactive to light. No scleral icterus. Neck: Normal range of motion. No thyromegaly present. Cardiovascular: Normal rate, regular rhythm and normal heart sounds. No murmur heard. Pulmonary/Chest: Effort normal. No respiratory distress. She has no wheezes. She has no rales. Abdominal: Soft. She exhibits no distension. Musculoskeletal: She exhibits no edema. Neurological: She is alert and oriented to person, place, and time. She has normal reflexes. No cranial nerve deficit. Psychiatric: She has a normal mood and affect. Her behavior is normal. Judgment and thought content normal.   Vitals reviewed. Assessment/Plan   1. Essential hypertension, benign  This problem is stable and well- controlled. We have reviewed all labs ,meds, and issues. Will  Continue and follow-up as appropriate. 2. Mild persistent asthma without complication  Stable  Cut flovent  To one puff bid    3. Hypothyroidism, unspecified type  Well controlled, continue meds      4. Hyperlipidemia, unspecified hyperlipidemia type  Stable  Well controlled, continue meds      5.  Morbid obesity due to excess calories ([facility]  Losing weight    Discussed  diet        Suzette Mack MD

## 2018-08-14 NOTE — PROGRESS NOTES
REASON FOR CONSULTATION/CC:    Chief Complaint   Patient presents with    Asthma     f/u Asthma        Consult at request of   Cathryn Rowland MD    PCP: Cathryn Rowland MD    HISTORY OF PRESENT ILLNESS: Ovidio Arias is a 75y.o. year old female with a history of asthma who presents         Asthma  ACT 14. She believes she is controlled. Using albuterol ~ 3 times per week, using more if out isde. Dulera she is using 2 puff daily. Has nebulizer but not using . allergic rhinitis   Astelin was added to Flonase in last visit. Additional education was given on last visit on how to use medications. No issues with controll. PAST MEDICAL HISTORY:  Past Medical History:   Diagnosis Date    Asthma     Graves disease     Hyperlipidemia     Hypertension     Hyperthyroidism     Hypothyroidism     Mild persistent asthma without complication     Osteoarthritis     Peptic ulcer, unspecified site, unspecified as acute or chronic, without mention of hemorrhage or perforation     Sleep apnea     CPAP    Venous insufficiency        PAST SURGICAL HISTORY:  Past Surgical History:   Procedure Laterality Date    CHOLECYSTECTOMY  2001    COLONOSCOPY  2011    normal-daniel    HYSTERECTOMY      JOINT REPLACEMENT  11/2010    RT SHOULDER SURGERY       FAMILY HISTORY:  family history includes Colon Cancer in her father; Diabetes in her sister; Hypertension in her mother. SOCIAL HISTORY:   reports that she has never smoked. She has never used smokeless tobacco.      ALLERGIES:  Patient is allergic to montelukast sodium and sulfa antibiotics.     REVIEW OF SYSTEMS:  Constitutional: Negative for fever    HENT: Negative for sore throat no  sinus drainage  Eyes: Negative for redness   Respiratory: Negative for dyspnea, no cough  Cardiovascular: Negative for chest pain  Gastrointestinal: Negative for vomiting, diarrhea   Genitourinary: Negative for hematuria   Musculoskeletal: Negative for arthralgias   Skin: Negative for rash  Neurological: Negative for syncope  Hematological: Negative for adenopathy  Psychiatric/Behavorial: Negative for anxiety    Objective:   PHYSICAL EXAM:  Blood pressure 130/76, pulse 68, temperature 97.6 °F (36.4 °C), temperature source Oral, resp. rate 16, height 5' 1\" (1.549 m), weight 233 lb (105.7 kg), SpO2 93 %, not currently breastfeeding.'  Gen: No distress. obese Body mass index is 44.02 kg/m². Eyes: PERRL. No sclera icterus. No conjunctival injection. ENT: no discharge. Pharynx clear. External appearance of ears and nose normal.  Neck: Trachea midline. No obvious mass. Resp: No accessory muscle use. No crackles. No wheezes. No rhonchi. CV: Regular rate. Regular rhythm. No murmur or rub. No edema. Skin: Warm, dry, normal texture and turgor. No nodule on exposed extremities. Lymph: No cervical LAD. No supraclavicular LAD. M/S: No cyanosis. No clubbing. No joint deformity. Neuro: Moves all four extremities. Psych: Oriented x 3. No anxiety. Awake. Alert. Intact judgement and insight.     Current Outpatient Prescriptions   Medication Sig Dispense Refill    DULERA 200-5 MCG/ACT inhaler INHALE 2 PUFFS INTO THE LUNGS TWICE DAILY 3 Inhaler 1    levothyroxine (SYNTHROID) 150 MCG tablet TAKE 1 TABLET BY MOUTH DAILY 90 tablet 0    metoprolol tartrate (LOPRESSOR) 25 MG tablet TAKE 1 TABLET BY MOUTH TWICE DAILY 180 tablet 3    ezetimibe (ZETIA) 10 MG tablet TAKE 1 TABLET DAILY 90 tablet 3    telmisartan-hydrochlorothiazide (MICARDIS HCT) 80-25 MG per tablet TAKE 1 TABLET BY MOUTH DAILY 90 tablet 2    fluticasone (FLONASE) 50 MCG/ACT nasal spray 1 spray by Nasal route 2 times daily 1 Bottle 3    ipratropium-albuterol (DUONEB) 0.5-2.5 (3) MG/3ML SOLN nebulizer solution Inhale 3 mLs into the lungs every 2 hours as needed for Shortness of Breath 60 vial 0    albuterol sulfate  (90 Base) MCG/ACT inhaler Inhale 2 puffs into the lungs every 6 hours

## 2018-08-15 ENCOUNTER — OFFICE VISIT (OUTPATIENT)
Dept: PULMONOLOGY | Age: 73
End: 2018-08-15

## 2018-08-15 VITALS
HEIGHT: 61 IN | HEART RATE: 68 BPM | WEIGHT: 233 LBS | RESPIRATION RATE: 16 BRPM | BODY MASS INDEX: 43.99 KG/M2 | SYSTOLIC BLOOD PRESSURE: 130 MMHG | DIASTOLIC BLOOD PRESSURE: 76 MMHG | OXYGEN SATURATION: 93 % | TEMPERATURE: 97.6 F

## 2018-08-15 DIAGNOSIS — J45.30 MILD PERSISTENT ASTHMA WITHOUT COMPLICATION: ICD-10-CM

## 2018-08-15 DIAGNOSIS — J30.89 OTHER ALLERGIC RHINITIS: ICD-10-CM

## 2018-08-15 PROCEDURE — 3017F COLORECTAL CA SCREEN DOC REV: CPT | Performed by: INTERNAL MEDICINE

## 2018-08-15 PROCEDURE — 99214 OFFICE O/P EST MOD 30 MIN: CPT | Performed by: INTERNAL MEDICINE

## 2018-08-15 PROCEDURE — 1101F PT FALLS ASSESS-DOCD LE1/YR: CPT | Performed by: INTERNAL MEDICINE

## 2018-08-15 PROCEDURE — 1090F PRES/ABSN URINE INCON ASSESS: CPT | Performed by: INTERNAL MEDICINE

## 2018-08-15 PROCEDURE — G8399 PT W/DXA RESULTS DOCUMENT: HCPCS | Performed by: INTERNAL MEDICINE

## 2018-08-15 PROCEDURE — 1036F TOBACCO NON-USER: CPT | Performed by: INTERNAL MEDICINE

## 2018-08-15 PROCEDURE — 4040F PNEUMOC VAC/ADMIN/RCVD: CPT | Performed by: INTERNAL MEDICINE

## 2018-08-15 PROCEDURE — G8417 CALC BMI ABV UP PARAM F/U: HCPCS | Performed by: INTERNAL MEDICINE

## 2018-08-15 PROCEDURE — 1123F ACP DISCUSS/DSCN MKR DOCD: CPT | Performed by: INTERNAL MEDICINE

## 2018-08-15 PROCEDURE — G8427 DOCREV CUR MEDS BY ELIG CLIN: HCPCS | Performed by: INTERNAL MEDICINE

## 2018-08-15 ASSESSMENT — ASTHMA QUESTIONNAIRES
QUESTION_2 LAST FOUR WEEKS HOW OFTEN HAVE YOU HAD SHORTNESS OF BREATH: 3
QUESTION_4 LAST FOUR WEEKS HOW OFTEN HAVE YOU USED YOUR RESCUE INHALER OR NEBULIZER MEDICATION (SUCH AS ALBUTEROL): 3
QUESTION_5 LAST FOUR WEEKS HOW WOULD YOU RATE YOUR ASTHMA CONTROL: 3
ACT_TOTALSCORE: 14
QUESTION_1 LAST FOUR WEEKS HOW MUCH OF THE TIME DID YOUR ASTHMA KEEP YOU FROM GETTING AS MUCH DONE AT WORK, SCHOOL OR AT HOME: 3
QUESTION_3 LAST FOUR WEEKS HOW OFTEN DID YOUR ASTHMA SYMPTOMS (WHEEZING, COUGHING, SHORTNESS OF BREATH, CHEST TIGHTNESS OR PAIN) WAKE YOU UP AT NIGHT OR EARLIER THAN USUAL IN THE MORNING: 2

## 2018-08-28 ENCOUNTER — TELEPHONE (OUTPATIENT)
Dept: INTERNAL MEDICINE CLINIC | Age: 73
End: 2018-08-28

## 2018-09-18 ENCOUNTER — PAT TELEPHONE (OUTPATIENT)
Dept: PREADMISSION TESTING | Age: 73
End: 2018-09-18

## 2018-09-18 VITALS — HEIGHT: 62 IN | WEIGHT: 231.38 LBS | BODY MASS INDEX: 42.58 KG/M2

## 2018-09-18 RX ORDER — LORATADINE 10 MG/1
10 TABLET ORAL DAILY
COMMUNITY
End: 2018-12-04 | Stop reason: CLARIF

## 2018-09-18 NOTE — PRE-PROCEDURE INSTRUCTIONS
[address] [facility] time___0800_________        Surgery time____0930________    Take the following medications with a sip of water:  dulera and bring albuterol inhaler, take levothyroxine    Do not eat or drink anything after 12:00 midnight prior to your surgery. This includes water chewing gum, mints and ice chips. You may brush your teeth and gargle the morning of your surgery, but do not swallow the water     Please see your family doctor/pediatrician for a history and physical and/or concerning medications. Bring any test results/reports from your physicians office. If you are under the care of a heart doctor or specialist doctor, please be aware that you may be asked to them for clearance    You may be asked to stop blood thinners such as Coumadin, Plavix, Fragmin, Lovenox, etc., or any anti-inflammatories such as:  Aspirin, Ibuprofen, Advil, Naproxen prior to your surgery. We also ask that you stop any OTC medications such as fish oil, vitamin E, glucosamine, garlic, Multivitamins, COQ 10, etc.    We ask that you do not smoke 24 hours prior to surgery  We ask that you do not  drink any alcoholic beverages 24 hours prior to surgery     You must make arrangements for a responsible adult to take you home after your surgery. For your safety you will not be allowed to leave alone or drive yourself home. Your surgery will be cancelled if you do not have a ride home. Also for your safety, it is strongly suggested that someone stay with you the first 24 hours after your surgery. A parent or legal guardian must accompany a child scheduled for surgery and plan to stay at the hospital until the child is discharged. Please do not bring other children with you. For your comfort, please wear simple loose fitting clothing to the hospital.  Please do not bring valuables.     Do not wear any make-up or nail polish on your fingers or toes      For your safety,

## 2018-09-18 NOTE — PROGRESS NOTES
C-Difficile admission screening and protocol:     * Admitted with diarrhea? YES____    NO__X___     *Prior history of C-Diff. In last 3 months? YES____   NO__X___     *Antibiotic use in the past 6-8 weeks? NO___X___YES______                 If yes which  ANTIBIOTIC AND REASON______     *Prior hospitalization or nursing home in the last month?  YES____   NO__X__

## 2018-09-19 ENCOUNTER — HOSPITAL ENCOUNTER (OUTPATIENT)
Dept: ENDOSCOPY | Age: 73
Discharge: HOME | End: 2018-09-19
Attending: INTERNAL MEDICINE | Admitting: INTERNAL MEDICINE

## 2018-09-19 VITALS
DIASTOLIC BLOOD PRESSURE: 59 MMHG | TEMPERATURE: 97.3 F | BODY MASS INDEX: 42.52 KG/M2 | RESPIRATION RATE: 20 BRPM | OXYGEN SATURATION: 97 % | HEIGHT: 62 IN | HEART RATE: 60 BPM | WEIGHT: 231.04 LBS | SYSTOLIC BLOOD PRESSURE: 160 MMHG

## 2018-09-19 RX ORDER — MEPERIDINE HYDROCHLORIDE 25 MG/ML
12.5 INJECTION INTRAMUSCULAR; INTRAVENOUS; SUBCUTANEOUS EVERY 5 MIN PRN
Status: DISCONTINUED | OUTPATIENT
Start: 2018-09-19 | End: 2018-09-20 | Stop reason: HOSPADM

## 2018-09-19 RX ORDER — SODIUM CHLORIDE 0.9 % (FLUSH) 0.9 %
10 SYRINGE (ML) INJECTION PRN
Status: DISCONTINUED | OUTPATIENT
Start: 2018-09-19 | End: 2018-09-20 | Stop reason: HOSPADM

## 2018-09-19 RX ORDER — OXYCODONE HYDROCHLORIDE AND ACETAMINOPHEN 5; 325 MG/1; MG/1
2 TABLET ORAL PRN
Status: ACTIVE | OUTPATIENT
Start: 2018-09-19 | End: 2018-09-19

## 2018-09-19 RX ORDER — OXYCODONE HYDROCHLORIDE AND ACETAMINOPHEN 5; 325 MG/1; MG/1
1 TABLET ORAL PRN
Status: ACTIVE | OUTPATIENT
Start: 2018-09-19 | End: 2018-09-19

## 2018-09-19 RX ORDER — ONDANSETRON 2 MG/ML
4 INJECTION INTRAMUSCULAR; INTRAVENOUS
Status: ACTIVE | OUTPATIENT
Start: 2018-09-19 | End: 2018-09-19

## 2018-09-19 RX ORDER — SODIUM CHLORIDE 0.9 % (FLUSH) 0.9 %
10 SYRINGE (ML) INJECTION EVERY 12 HOURS SCHEDULED
Status: DISCONTINUED | OUTPATIENT
Start: 2018-09-19 | End: 2018-09-20 | Stop reason: HOSPADM

## 2018-09-19 RX ORDER — FENTANYL CITRATE 50 UG/ML
50 INJECTION, SOLUTION INTRAMUSCULAR; INTRAVENOUS EVERY 5 MIN PRN
Status: DISCONTINUED | OUTPATIENT
Start: 2018-09-19 | End: 2018-09-20 | Stop reason: HOSPADM

## 2018-09-19 RX ORDER — SODIUM CHLORIDE 9 MG/ML
INJECTION, SOLUTION INTRAVENOUS CONTINUOUS
Status: DISCONTINUED | OUTPATIENT
Start: 2018-09-19 | End: 2018-09-20 | Stop reason: HOSPADM

## 2018-09-19 RX ORDER — MORPHINE SULFATE 2 MG/ML
1 INJECTION, SOLUTION INTRAMUSCULAR; INTRAVENOUS EVERY 5 MIN PRN
Status: DISCONTINUED | OUTPATIENT
Start: 2018-09-19 | End: 2018-09-20 | Stop reason: HOSPADM

## 2018-09-19 RX ORDER — FENTANYL CITRATE 50 UG/ML
25 INJECTION, SOLUTION INTRAMUSCULAR; INTRAVENOUS EVERY 5 MIN PRN
Status: DISCONTINUED | OUTPATIENT
Start: 2018-09-19 | End: 2018-09-20 | Stop reason: HOSPADM

## 2018-09-19 RX ORDER — MORPHINE SULFATE 2 MG/ML
2 INJECTION, SOLUTION INTRAMUSCULAR; INTRAVENOUS EVERY 5 MIN PRN
Status: DISCONTINUED | OUTPATIENT
Start: 2018-09-19 | End: 2018-09-20 | Stop reason: HOSPADM

## 2018-09-19 RX ADMIN — SODIUM CHLORIDE: 9 INJECTION, SOLUTION INTRAVENOUS at 08:35

## 2018-09-19 ASSESSMENT — PAIN SCALES - GENERAL
PAINLEVEL_OUTOF10: 0

## 2018-09-19 ASSESSMENT — PAIN DESCRIPTION - PAIN TYPE
TYPE: SURGICAL PAIN

## 2018-09-19 ASSESSMENT — LIFESTYLE VARIABLES: SMOKING_STATUS: 0

## 2018-09-19 ASSESSMENT — PAIN - FUNCTIONAL ASSESSMENT: PAIN_FUNCTIONAL_ASSESSMENT: 0-10

## 2018-09-19 NOTE — ANESTHESIA PRE-OP
daily      Multiple Vitamins-Minerals (MULTIVITAMIN ADULT PO) Take 1 tablet by mouth daily Stopped for procedure      metoprolol tartrate (LOPRESSOR) 25 MG tablet TAKE 1 TABLET BY MOUTH TWICE DAILY 180 tablet 3    DULERA 200-5 MCG/ACT inhaler INHALE 2 PUFFS INTO THE LUNGS TWICE DAILY (Patient taking differently: INHALE 2 PUFFS INTO THE LUNGS DAILY) 3 Inhaler 1    levothyroxine (SYNTHROID) 150 MCG tablet TAKE 1 TABLET BY MOUTH DAILY 90 tablet 0    ezetimibe (ZETIA) 10 MG tablet TAKE 1 TABLET DAILY (Patient taking differently: TAKE 1 TABLET EVENING) 90 tablet 3    telmisartan-hydrochlorothiazide (MICARDIS HCT) 80-25 MG per tablet TAKE 1 TABLET BY MOUTH DAILY (Patient taking differently: TAKE 1 TABLET BY MOUTH EVENING) 90 tablet 2    fluticasone (FLONASE) 50 MCG/ACT nasal spray 1 spray by Nasal route 2 times daily (Patient taking differently: 1 spray by Nasal route daily ) 1 Bottle 3    albuterol sulfate  (90 Base) MCG/ACT inhaler Inhale 2 puffs into the lungs every 6 hours as needed for Wheezing 1 Inhaler 4    dicyclomine (BENTYL) 20 MG tablet Take 1 tablet by mouth 4 times daily as needed (prn) 90 tablet 1    cyanocobalamin ([employer] VITAMIN B12) 1000 MCG tablet Take 1 tablet by mouth daily 30 tablet 3    aspirin 81 MG tablet Take 81 mg by mouth daily. No current facility-administered medications on file prior to encounter.       Current Outpatient Prescriptions   Medication Sig Dispense Refill    loratadine (CLARITIN) 10 MG tablet Take 10 mg by mouth daily      Multiple Vitamins-Minerals (MULTIVITAMIN ADULT PO) Take 1 tablet by mouth daily Stopped for procedure      metoprolol tartrate (LOPRESSOR) 25 MG tablet TAKE 1 TABLET BY MOUTH TWICE DAILY 180 tablet 3    DULERA 200-5 MCG/ACT inhaler INHALE 2 PUFFS INTO THE LUNGS TWICE DAILY (Patient taking differently: INHALE 2 PUFFS INTO THE LUNGS DAILY) 3 Inhaler 1    levothyroxine (SYNTHROID) 150 MCG tablet TAKE 1 TABLET BY MOUTH DAILY 90 tablet 0    ezetimibe (ZETIA) 10 MG tablet TAKE 1 TABLET DAILY (Patient taking differently: TAKE 1 TABLET EVENING) 90 tablet 3    telmisartan-hydrochlorothiazide (MICARDIS HCT) 80-25 MG per tablet TAKE 1 TABLET BY MOUTH DAILY (Patient taking differently: TAKE 1 TABLET BY MOUTH EVENING) 90 tablet 2    fluticasone (FLONASE) 50 MCG/ACT nasal spray 1 spray by Nasal route 2 times daily (Patient taking differently: 1 spray by Nasal route daily ) 1 Bottle 3    albuterol sulfate  (90 Base) MCG/ACT inhaler Inhale 2 puffs into the lungs every 6 hours as needed for Wheezing 1 Inhaler 4    dicyclomine (BENTYL) 20 MG tablet Take 1 tablet by mouth 4 times daily as needed (prn) 90 tablet 1    cyanocobalamin ([employer] VITAMIN B12) 1000 MCG tablet Take 1 tablet by mouth daily 30 tablet 3    aspirin 81 MG tablet Take 81 mg by mouth daily.        Current Facility-Administered Medications   Medication Dose Route Frequency Provider Last Rate Last Dose    0.9 % sodium chloride infusion   Intravenous Continuous Blanca Bell  mL/hr at 18 08      sodium chloride flush 0.9 % injection 10 mL  10 mL Intravenous 2 times per day Blanca Bell MD        sodium chloride flush 0.9 % injection 10 mL  10 mL Intravenous PRN Blanca Bell MD         Vital Signs (Current)   Vitals:    18   BP: (!) 147/67   Pulse: 64   Resp: 18   Temp: 97.4 °F (36.3 °C)   SpO2: 94%     Vital Signs Statistics (for past 48 hrs)     Temp  Av.4 °F (36.3 °C)  Min: 97.4 °F (36.3 °C)   Min taken time: 18  Max: 97.4 °F (36.3 °C)   Max taken time: 18  Pulse  Av  Min: 59   Min taken time: 18  Max: 59   Max taken time: 18  Resp  Av  Min: 25   Min taken time: 18  Max: 18   Max taken time: 18  BP  Min: 147/67   Min taken time: 18  Max: 147/67   Max taken time: 18  SpO2  Av %  Min: 94 %   Min taken time: 18  Max: 94 %   Max taken time: 18 1438    BP Readings from Last 3 Encounters:   09/19/18 (!) 147/67   08/15/18 130/76   08/07/18 136/70     BMI  Body mass index is 42.95 kg/m². Estimated body mass index is 42.95 kg/m² as calculated from the following:    Height as of this encounter: 5' 1.5\" (1.562 m). Weight as of this encounter: 231 lb 0.7 oz (104.8 kg). CBC   Lab Results   Component Value Date    WBC 9.9 05/01/2018    RBC 4.28 05/01/2018    HGB 12.8 05/01/2018    HCT 38.4 05/01/2018    MCV 89.8 05/01/2018    RDW 14.2 05/01/2018     05/01/2018     CMP    Lab Results   Component Value Date     08/06/2018    K 3.8 08/06/2018    K 4.5 04/22/2018     08/06/2018    CO2 24 08/06/2018    BUN 32 08/06/2018    CREATININE 0.8 08/06/2018    GFRAA >60 08/06/2018    GFRAA >60 02/28/2013    AGRATIO 1.8 08/06/2018    LABGLOM >60 08/06/2018    LABGLOM 62.5 07/05/2011    GLUCOSE 90 08/06/2018    GLUCOSE 98 07/05/2011    PROT 6.5 08/06/2018    PROT 6.7 02/28/2013    CALCIUM 9.6 08/06/2018    BILITOT 0.3 08/06/2018    ALKPHOS 129 08/06/2018    AST 10 08/06/2018    ALT 21 08/06/2018     BMP    Lab Results   Component Value Date     08/06/2018    K 3.8 08/06/2018    K 4.5 04/22/2018     08/06/2018    CO2 24 08/06/2018    BUN 32 08/06/2018    CREATININE 0.8 08/06/2018    CALCIUM 9.6 08/06/2018    GFRAA >60 08/06/2018    GFRAA >60 02/28/2013    LABGLOM >60 08/06/2018    LABGLOM 62.5 07/05/2011    GLUCOSE 90 08/06/2018    GLUCOSE 98 07/05/2011     POCGlucose  No results for input(s): GLUCOSE in the last 72 hours.    Coags    Lab Results   Component Value Date    PROTIME 10.3 05/01/2018    INR 0.91 35/15/2403     HCG (If Applicable) No results found for: PREGTESTUR, PREGSERUM, HCG, HCGQUANT   ABGs No results found for: PHART, PO2ART, PVP6VHO, KHL2ZOR, BEART, T5PIVZNY   Type & Screen (If Applicable)  No results found for: LABABO, [address]    Surgeon:    Procedure:    Medications prior to admission:   Prior to Admission medications Medication Sig Start Date End Date Taking? Authorizing Provider   loratadine (CLARITIN) 10 MG tablet Take 10 mg by mouth daily   Yes Historical Provider, MD   Multiple Vitamins-Minerals (MULTIVITAMIN ADULT PO) Take 1 tablet by mouth daily Stopped for procedure   Yes Historical Provider, MD   metoprolol tartrate (LOPRESSOR) 25 MG tablet TAKE 1 TABLET BY MOUTH TWICE DAILY 9/6/18  Yes Jelly Carrasco MD   DULERA 200-5 MCG/ACT inhaler INHALE 2 PUFFS INTO THE LUNGS TWICE DAILY  Patient taking differently: INHALE 2 PUFFS INTO THE LUNGS DAILY 7/3/18  Yes Seda Hobson MD   levothyroxine (SYNTHROID) 150 MCG tablet TAKE 1 TABLET BY MOUTH DAILY 7/2/18  Yes TIFFANIE Barrett - CNP   ezetimibe (ZETIA) 10 MG tablet TAKE 1 TABLET DAILY  Patient taking differently: TAKE 1 TABLET EVENING 6/4/18  Yes Sunil Munoz MD   telmisartan-hydrochlorothiazide (MICARDIS HCT) 80-25 MG per tablet TAKE 1 TABLET BY MOUTH DAILY  Patient taking differently: TAKE 1 TABLET BY MOUTH EVENING 5/30/18  Yes Jelly Carrasco MD   fluticasone Jing Fresh) 50 MCG/ACT nasal spray 1 spray by Nasal route 2 times daily  Patient taking differently: 1 spray by Nasal route daily  5/15/18  Yes Seda Hobson MD   albuterol sulfate  (90 Base) MCG/ACT inhaler Inhale 2 puffs into the lungs every 6 hours as needed for Wheezing 4/17/18  Yes Jelly Carrasco MD   dicyclomine (BENTYL) 20 MG tablet Take 1 tablet by mouth 4 times daily as needed (prn) 7/14/16  Yes Jelly Carrasco MD   cyanocobalamin ([employer]S VITAMIN B12) 1000 MCG tablet Take 1 tablet by mouth daily 4/15/16  Yes Jelly Carrasco MD   aspirin 81 MG tablet Take 81 mg by mouth daily.     Historical Provider, MD       Current medications:    Current Outpatient Prescriptions   Medication Sig Dispense Refill    loratadine (CLARITIN) 10 MG tablet Take 10 mg by mouth daily      Multiple Vitamins-Minerals (MULTIVITAMIN ADULT PO) Take 1 tablet by mouth daily Stopped for procedure      metoprolol tartrate (LOPRESSOR) 25 MG tablet TAKE 1 TABLET BY MOUTH TWICE DAILY 180 tablet 3    DULERA 200-5 MCG/ACT inhaler INHALE 2 PUFFS INTO THE LUNGS TWICE DAILY (Patient taking differently: INHALE 2 PUFFS INTO THE LUNGS DAILY) 3 Inhaler 1    levothyroxine (SYNTHROID) 150 MCG tablet TAKE 1 TABLET BY MOUTH DAILY 90 tablet 0    ezetimibe (ZETIA) 10 MG tablet TAKE 1 TABLET DAILY (Patient taking differently: TAKE 1 TABLET EVENING) 90 tablet 3    telmisartan-hydrochlorothiazide (MICARDIS HCT) 80-25 MG per tablet TAKE 1 TABLET BY MOUTH DAILY (Patient taking differently: TAKE 1 TABLET BY MOUTH EVENING) 90 tablet 2    fluticasone (FLONASE) 50 MCG/ACT nasal spray 1 spray by Nasal route 2 times daily (Patient taking differently: 1 spray by Nasal route daily ) 1 Bottle 3    albuterol sulfate  (90 Base) MCG/ACT inhaler Inhale 2 puffs into the lungs every 6 hours as needed for Wheezing 1 Inhaler 4    dicyclomine (BENTYL) 20 MG tablet Take 1 tablet by mouth 4 times daily as needed (prn) 90 tablet 1    cyanocobalamin (CVS VITAMIN B12) 1000 MCG tablet Take 1 tablet by mouth daily 30 tablet 3    aspirin 81 MG tablet Take 81 mg by mouth daily. Current Facility-Administered Medications   Medication Dose Route Frequency Provider Last Rate Last Dose    0.9 % sodium chloride infusion   Intravenous Continuous Bhupendra Velazquez  mL/hr at 09/19/18 0835      sodium chloride flush 0.9 % injection 10 mL  10 mL Intravenous 2 times per day Bhupendra Velazquez MD        sodium chloride flush 0.9 % injection 10 mL  10 mL Intravenous PRN Bhupendra Velazquez MD           Allergies:     Allergies   Allergen Reactions    Montelukast Sodium Shortness Of Breath    Sulfa Antibiotics Shortness Of Breath       Problem List:    Patient Active Problem List   Diagnosis Code    Essential hypertension, benign I10    Hypothyroidism E03.9    Degenerative joint disease M19.90    Anxiety F41.9    Mild persistent asthma Evaluation  Patient summary reviewed history of anesthetic complications (slow emerge): Airway: Mallampati: III  TM distance: <3 FB   Neck ROM: limited  Mouth opening: > = 3 FB Dental:    (+) upper dentures and lower dentures      Pulmonary: breath sounds clear to auscultation  (+) sleep apnea: on CPAP,  asthma (daily dulera , plus prn albut , stable):     (-) not a current smoker          Patient did not smoke on day of surgery. Cardiovascular:    (+) hypertension:, hyperlipidemia      ECG reviewed  Rhythm: regular  Rate: normal           Beta Blocker:  Dose within 24 Hrs         Neuro/Psych:   Negative Neuro/Psych ROS              GI/Hepatic/Renal:   (+) PUD, bowel prep, morbid obesity          Endo/Other:    (+) hypothyroidism: arthritis: OA., no malignancy/cancer. (-) no malignancy/cancer               Abdominal:   (+) obese,     Abdomen: soft. Vascular: negative vascular ROS.  + PVD, aortic or cerebral (venous insuff.), . Anesthesia Plan      TIVA     ASA 3       Induction: intravenous. MIPS: Postoperative opioids intended and Prophylactic antiemetics administered. Anesthetic plan and risks discussed with patient. Plan discussed with CRNA. This pre-anesthesia assessment may be used as a history and physical.    DOS STAFF ADDENDUM:    Pt seen and examined, chart reviewed (including anesthesia, drug and allergy history). No interval changes to history and physical examination. Anesthetic plan, risks, benefits, alternatives, and personnel involved discussed with patient. Patient verbalized an understanding and agrees to proceed.       Nemesio Richardson MD  September 19, 2018  9:05 AM      Nemesio Richardson MD   9/19/2018

## 2018-09-19 NOTE — H&P
fluticasone (FLONASE) 50 MCG/ACT nasal spray 1 spray by Nasal route 2 times daily (Patient taking differently: 1 spray by Nasal route daily ) 1 Bottle 3    albuterol sulfate  (90 Base) MCG/ACT inhaler Inhale 2 puffs into the lungs every 6 hours as needed for Wheezing 1 Inhaler 4    dicyclomine (BENTYL) 20 MG tablet Take 1 tablet by mouth 4 times daily as needed (prn) 90 tablet 1    cyanocobalamin (CVS VITAMIN B12) 1000 MCG tablet Take 1 tablet by mouth daily 30 tablet 3    aspirin 81 MG tablet Take 81 mg by mouth daily. No current facility-administered medications on file prior to encounter. Allergies:  Montelukast sodium and Sulfa antibiotics    Social History:      Social History     Social History    Marital status:      Spouse name: N/A    Number of children: N/A    Years of education: N/A     Occupational History    Not on file. Social History Main Topics    Smoking status: Never Smoker    Smokeless tobacco: Never Used    Alcohol use Yes      Comment: SOCIALLY    Drug use: No    Sexual activity: No     Other Topics Concern    Not on file     Social History Narrative    No narrative on file           Family History:   Family History   Problem Relation Age of Onset    Hypertension Mother     Colon Cancer Father     Diabetes Sister          PHYSICAL EXAM:      BP (!) 147/67   Pulse 64   Temp 97.4 °F (36.3 °C) (Temporal)   Resp 18   Ht 5' 1.5\" (1.562 m)   Wt 231 lb 0.7 oz (104.8 kg)   SpO2 94%   BMI 42.95 kg/m²  I        Heart: Normal    Lungs: Normal    Abdomen: Normal      ASA Grade: ASA 3 - Patient with moderate systemic disease with functional limitations    III (soft palate, base of uvula visible)  ASSESSMENT AND PLAN:    1. Patient is a 75 y.o. female here for Colonoscopy  2. Procedure options, risks and benefits reviewed with patient who expresses understanding.

## 2018-09-19 NOTE — BRIEF OP NOTE
Brief Postoperative Note  Emi Odonnell  1945  [id number]    Previous Colonoscopy: Yes  Date: 04/11/13  Greater than 3 years?  Yes    Pre-operative Diagnosis: Polyp surveillance; family history of colon cancer    Post-operative Diagnosis: Same    Procedure: Colonoscopy    Anesthesia: MAC    Surgeons/Assistants: Fam    Estimated Blood Loss: None    Complications: None    Specimens: Was Not Obtained    Findings: See dictated report    Electronically signed by Karie Flores MD, on 9/19/2018, at 10:16 AM

## 2018-09-19 NOTE — ANESTHESIA POST-OP
[facility] Department of Anesthesiology  Post-Anesthesia Note       Name:  Coleen Hazel                                         Age:  75 y.o. MRN:  [id number]     Last Vitals & Oxygen Saturation: BP (!) 160/59   Pulse 60   Temp 97.3 °F (36.3 °C) (Temporal)   Resp 20   Ht 5' 1.5\" (1.562 m)   Wt 231 lb 0.7 oz (104.8 kg)   SpO2 97%   BMI 42.95 kg/m²   No data found.       Level of consciousness: awake, alert and oriented    Respiratory: stable     Cardiovascular: stable     Hydration: stable     PONV: stable     Post-op pain: adequate analgesia    Post-op assessment: no apparent anesthetic complications and tolerated procedure well    Complications:  none    Evita Mart MD  September 19, 2018   5:30 PM

## 2018-09-20 NOTE — PROCEDURES
[address] [facility]                                COLONOSCOPY REPORT    PATIENT NAME: Laura Astorga                      :        1945  MED REC NO:   [id number]                          ROOM:  ACCOUNT NO:   [de-identified]                          ADMIT DATE: 2018  PROVIDER:     Mike Mccarty MD    DATE OF PROCEDURE:  2018    REFERRING PROVIDER:  Dr. Briana Rose. PATIENT HISTORY:  A 50-year-old female, outpatient. INSTRUMENT USED:  Olympus PCF-H190L. MEDICATIONS OF PROCEDURE:  The patient was premedicated with Diprivan  intravenously as administered by the anesthesiology service. INDICATIONS:  The patient has a history of colonic polyps and a family  history of colon cancer. DESCRIPTION OF PROCEDURE:  The digital and anal exams were normal.  The  colonoscope was inserted to the cecum. The prep was good to fair. Where  the prep was fair, lesions such as small polyps or vascular ectasias could  have been missed. However, no mucosal lesions were identified. There was  diffuse diverticulosis. IMPRESSION:  Diffuse diverticulosis only. PLAN:  The patient should undergo a surveillance colonoscopy in 5 years.         Bon Rizo MD    D: 2018 10:31:06       T: 2018 10:32:15     [id number]  Job#: [id number]     Doc#: [id number]    CC:  Dr. Briana Rose

## 2018-12-20 ENCOUNTER — HOSPITAL ENCOUNTER (OUTPATIENT)
Age: 73
Discharge: HOME | End: 2018-12-20
Payer: MEDICARE

## 2018-12-20 ENCOUNTER — HOSPITAL ENCOUNTER (OUTPATIENT)
Dept: GENERAL RADIOLOGY | Age: 73
Discharge: HOME | End: 2018-12-20
Payer: MEDICARE

## 2018-12-20 DIAGNOSIS — R06.02 SOB (SHORTNESS OF BREATH): ICD-10-CM

## 2018-12-20 DIAGNOSIS — J45.31 MILD PERSISTENT ASTHMA WITH ACUTE EXACERBATION: ICD-10-CM

## 2018-12-20 PROCEDURE — 71046 X-RAY EXAM CHEST 2 VIEWS: CPT

## 2018-12-21 ENCOUNTER — HOSPITAL ENCOUNTER (OUTPATIENT)
Dept: WOMENS IMAGING | Age: 73
Discharge: HOME | End: 2018-12-21
Payer: MEDICARE

## 2018-12-21 DIAGNOSIS — Z12.31 VISIT FOR SCREENING MAMMOGRAM: ICD-10-CM

## 2018-12-21 PROCEDURE — 77067 SCR MAMMO BI INCL CAD: CPT

## 2019-02-11 ENCOUNTER — OFFICE VISIT (OUTPATIENT)
Dept: PULMONOLOGY | Age: 74
End: 2019-02-11
Payer: MEDICARE

## 2019-02-11 VITALS
SYSTOLIC BLOOD PRESSURE: 136 MMHG | HEIGHT: 62 IN | DIASTOLIC BLOOD PRESSURE: 72 MMHG | OXYGEN SATURATION: 95 % | RESPIRATION RATE: 16 BRPM | BODY MASS INDEX: 43.06 KG/M2 | WEIGHT: 234 LBS | TEMPERATURE: 97.3 F | HEART RATE: 76 BPM

## 2019-02-11 DIAGNOSIS — J30.89 OTHER ALLERGIC RHINITIS: Primary | ICD-10-CM

## 2019-02-11 DIAGNOSIS — J45.30 MILD PERSISTENT ASTHMA WITHOUT COMPLICATION: ICD-10-CM

## 2019-02-11 PROCEDURE — 4040F PNEUMOC VAC/ADMIN/RCVD: CPT | Performed by: INTERNAL MEDICINE

## 2019-02-11 PROCEDURE — 1090F PRES/ABSN URINE INCON ASSESS: CPT | Performed by: INTERNAL MEDICINE

## 2019-02-11 PROCEDURE — G8482 FLU IMMUNIZE ORDER/ADMIN: HCPCS | Performed by: INTERNAL MEDICINE

## 2019-02-11 PROCEDURE — 99213 OFFICE O/P EST LOW 20 MIN: CPT | Performed by: INTERNAL MEDICINE

## 2019-02-11 PROCEDURE — 1101F PT FALLS ASSESS-DOCD LE1/YR: CPT | Performed by: INTERNAL MEDICINE

## 2019-02-11 PROCEDURE — G8417 CALC BMI ABV UP PARAM F/U: HCPCS | Performed by: INTERNAL MEDICINE

## 2019-02-11 PROCEDURE — G8399 PT W/DXA RESULTS DOCUMENT: HCPCS | Performed by: INTERNAL MEDICINE

## 2019-02-11 PROCEDURE — G8427 DOCREV CUR MEDS BY ELIG CLIN: HCPCS | Performed by: INTERNAL MEDICINE

## 2019-02-11 PROCEDURE — 3017F COLORECTAL CA SCREEN DOC REV: CPT | Performed by: INTERNAL MEDICINE

## 2019-02-11 PROCEDURE — 1036F TOBACCO NON-USER: CPT | Performed by: INTERNAL MEDICINE

## 2019-02-11 PROCEDURE — 1123F ACP DISCUSS/DSCN MKR DOCD: CPT | Performed by: INTERNAL MEDICINE

## 2019-02-11 ASSESSMENT — ASTHMA QUESTIONNAIRES
ACT_TOTALSCORE: 19
QUESTION_3 LAST FOUR WEEKS HOW OFTEN DID YOUR ASTHMA SYMPTOMS (WHEEZING, COUGHING, SHORTNESS OF BREATH, CHEST TIGHTNESS OR PAIN) WAKE YOU UP AT NIGHT OR EARLIER THAN USUAL IN THE MORNING: 4
QUESTION_2 LAST FOUR WEEKS HOW OFTEN HAVE YOU HAD SHORTNESS OF BREATH: 4
QUESTION_4 LAST FOUR WEEKS HOW OFTEN HAVE YOU USED YOUR RESCUE INHALER OR NEBULIZER MEDICATION (SUCH AS ALBUTEROL): 4
QUESTION_5 LAST FOUR WEEKS HOW WOULD YOU RATE YOUR ASTHMA CONTROL: 4
QUESTION_1 LAST FOUR WEEKS HOW MUCH OF THE TIME DID YOUR ASTHMA KEEP YOU FROM GETTING AS MUCH DONE AT WORK, SCHOOL OR AT HOME: 3

## 2019-04-16 ENCOUNTER — OFFICE VISIT (OUTPATIENT)
Dept: PULMONOLOGY | Age: 74
End: 2019-04-16
Payer: MEDICARE

## 2019-04-16 VITALS
TEMPERATURE: 97.5 F | DIASTOLIC BLOOD PRESSURE: 70 MMHG | WEIGHT: 227 LBS | BODY MASS INDEX: 41.77 KG/M2 | HEART RATE: 70 BPM | HEIGHT: 62 IN | OXYGEN SATURATION: 98 % | RESPIRATION RATE: 16 BRPM | SYSTOLIC BLOOD PRESSURE: 130 MMHG

## 2019-04-16 DIAGNOSIS — J45.30 MILD PERSISTENT ASTHMA WITHOUT COMPLICATION: ICD-10-CM

## 2019-04-16 DIAGNOSIS — J30.89 OTHER ALLERGIC RHINITIS: ICD-10-CM

## 2019-04-16 PROCEDURE — G8399 PT W/DXA RESULTS DOCUMENT: HCPCS | Performed by: INTERNAL MEDICINE

## 2019-04-16 PROCEDURE — 99213 OFFICE O/P EST LOW 20 MIN: CPT | Performed by: INTERNAL MEDICINE

## 2019-04-16 PROCEDURE — 1036F TOBACCO NON-USER: CPT | Performed by: INTERNAL MEDICINE

## 2019-04-16 PROCEDURE — 1123F ACP DISCUSS/DSCN MKR DOCD: CPT | Performed by: INTERNAL MEDICINE

## 2019-04-16 PROCEDURE — 3017F COLORECTAL CA SCREEN DOC REV: CPT | Performed by: INTERNAL MEDICINE

## 2019-04-16 PROCEDURE — G8417 CALC BMI ABV UP PARAM F/U: HCPCS | Performed by: INTERNAL MEDICINE

## 2019-04-16 PROCEDURE — 1090F PRES/ABSN URINE INCON ASSESS: CPT | Performed by: INTERNAL MEDICINE

## 2019-04-16 PROCEDURE — G8427 DOCREV CUR MEDS BY ELIG CLIN: HCPCS | Performed by: INTERNAL MEDICINE

## 2019-04-16 PROCEDURE — 4040F PNEUMOC VAC/ADMIN/RCVD: CPT | Performed by: INTERNAL MEDICINE

## 2019-04-16 ASSESSMENT — ASTHMA QUESTIONNAIRES
ACT_TOTALSCORE: 23
QUESTION_2 LAST FOUR WEEKS HOW OFTEN HAVE YOU HAD SHORTNESS OF BREATH: 4
QUESTION_3 LAST FOUR WEEKS HOW OFTEN DID YOUR ASTHMA SYMPTOMS (WHEEZING, COUGHING, SHORTNESS OF BREATH, CHEST TIGHTNESS OR PAIN) WAKE YOU UP AT NIGHT OR EARLIER THAN USUAL IN THE MORNING: 5
QUESTION_5 LAST FOUR WEEKS HOW WOULD YOU RATE YOUR ASTHMA CONTROL: 5
QUESTION_1 LAST FOUR WEEKS HOW MUCH OF THE TIME DID YOUR ASTHMA KEEP YOU FROM GETTING AS MUCH DONE AT WORK, SCHOOL OR AT HOME: 4
QUESTION_4 LAST FOUR WEEKS HOW OFTEN HAVE YOU USED YOUR RESCUE INHALER OR NEBULIZER MEDICATION (SUCH AS ALBUTEROL): 5

## 2019-04-16 NOTE — PROGRESS NOTES
REASON FOR CONSULTATION/CC:    Chief Complaint   Patient presents with    Follow-up     Asthma        Consult at request of   Maritza Dixon MD    PCP: Maritza Dixon MD    HISTORY OF PRESENT ILLNESS: Erasmo Hernandez is a 75y.o. year old female with a history of asthma who presents       Asthma  ACT 23. Continues to do well. Using [facility]. usign 1 puff bid. After spring, will decrease to 1 puff daily. allergic rhinitis   Using Flonase  1 spray each daily  Well controlled. ASTHMA CONTROL TEST 4/16/2019 2/11/2019 8/15/2018   In the past 4 weeks, how much of the time did your asthma keep you from getting as much done at work, school or at home? 4 3 3   During the past 4 weeks, how often have you had shortness of breath? 4 4 3   During the past 4 weeks, how often did your asthma symptoms (wheezing, coughing, shortness of breath, chest tightness or pain) wake you up at night or earlier than usual in the morning? 5 4 2   During the past 4 weeks, how often have you used your rescue inhaler or nebulizer medication (such as albuterol)? 5 4 3   How would you rate your asthma control during the past 4 weeks?  5 4 3   Asthma Control Test Total Score 23 19 14               PAST MEDICAL HISTORY:  Past Medical History:   Diagnosis Date    Asthma     Diverticulitis     Graves disease     Hyperlipidemia     Hypertension     Hyperthyroidism     Hypothyroidism     Mild persistent asthma without complication     Osteoarthritis     Peptic ulcer, unspecified site, unspecified as acute or chronic, without mention of hemorrhage or perforation     Prolonged emergence from general anesthesia     Sleep apnea     CPAP at night    Venous insufficiency     Wears glasses        PAST SURGICAL HISTORY:  Past Surgical History:   Procedure Laterality Date    BLEPHAROPLASTY Left     CHOLECYSTECTOMY  2001    COLONOSCOPY  2011    normal-daniel    FOOT SURGERY Left     spurs removed from left heel    HYSTERECTOMY      KNEE ARTHROSCOPY Left     ROTATOR CUFF REPAIR Right        FAMILY HISTORY:  family history includes Colon Cancer in her father; Diabetes in her sister; Hypertension in her mother. SOCIAL HISTORY:   reports that she has never smoked. She has never used smokeless tobacco.      ALLERGIES:  Patient is allergic to montelukast sodium and sulfa antibiotics. REVIEW OF SYSTEMS:  Constitutional: Negative for fever    HENT: Negative for sore throat  Respiratory: Negative for dyspnea, cough  Cardiovascular: Negative for chest pain  Gastrointestinal: Negative for vomiting, diarrhea   Skin: Negative for rash  Psychiatric/Behavorial: Negative for anxiety     Objective:   PHYSICAL EXAM:  Blood pressure 130/70, pulse 70, temperature 97.5 °F (36.4 °C), temperature source Oral, resp. rate 16, height 5' 1.5\" (1.562 m), weight 227 lb (103 kg), SpO2 98 %, not currently breastfeeding.'  Gen: No distress. Body mass index is 42.2 kg/m². ENT:   Resp: No accessory muscle use. No crackles. No wheezes. No rhonchi. CV: Regular rate. Regular rhythm. No murmur or rub. No edema. Skin: Warm, dry, normal texture and turgor. No nodule on exposed extremities. M/S: No cyanosis. No clubbing. No joint deformity. Psych: Oriented x 3. No anxiety. Awake. Alert. Intact judgement and insight. Good Mood / Affect. Memory appears in tact     Current Outpatient Medications   Medication Sig Dispense Refill    DICLOFENAC SODIUM PO Take 75 mg by mouth 2 times daily      ALPRAZolam (XANAX) 0.5 MG tablet Take 0.5 mg by mouth nightly as needed for Sleep.       telmisartan-hydrochlorothiazide (MICARDIS HCT) 80-25 MG per tablet TAKE 1 TABLET BY MOUTH DAILY 90 tablet 3    nebivolol (BYSTOLIC) 5 MG tablet Take 1 tablet by mouth daily 30 tablet 5    levothyroxine (SYNTHROID) 150 MCG tablet TAKE 1 TABLET BY MOUTH DAILY 90 tablet 3    Multiple Vitamins-Minerals (MULTIVITAMIN ADULT PO) Take 1 tablet by mouth daily Stopped for procedure      DULERA 200-5 MCG/ACT inhaler INHALE 2 PUFFS INTO THE LUNGS TWICE DAILY (Patient taking differently: INHALE 2 PUFFS INTO THE LUNGS DAILY) 3 Inhaler 1    ezetimibe (ZETIA) 10 MG tablet TAKE 1 TABLET DAILY (Patient taking differently: TAKE 1 TABLET EVENING) 90 tablet 3    fluticasone (FLONASE) 50 MCG/ACT nasal spray 1 spray by Nasal route 2 times daily (Patient taking differently: 1 spray by Nasal route daily ) 1 Bottle 3    albuterol sulfate  (90 Base) MCG/ACT inhaler Inhale 2 puffs into the lungs every 6 hours as needed for Wheezing 1 Inhaler 4    dicyclomine (BENTYL) 20 MG tablet Take 1 tablet by mouth 4 times daily as needed (prn) 90 tablet 1    cyanocobalamin ([employer] VITAMIN B12) 1000 MCG tablet Take 1 tablet by mouth daily 30 tablet 3    aspirin 81 MG tablet Take 81 mg by mouth daily. No current facility-administered medications for this visit. Data Reviewed:   CBC and Renal reviewed  Last CBC  Lab Results   Component Value Date    WBC 9.9 05/01/2018    RBC 4.28 05/01/2018    HGB 12.8 05/01/2018    MCV 89.8 05/01/2018     05/01/2018     Last Renal  Lab Results   Component Value Date     04/02/2019    K 4.0 04/02/2019    K 4.5 04/22/2018     04/02/2019    CO2 27 04/02/2019    CO2 28 12/04/2018    CO2 24 08/06/2018    BUN 21 04/02/2019    CREATININE 0.8 04/02/2019    GLUCOSE 98 04/02/2019    GLUCOSE 98 07/05/2011    CALCIUM 9.4 04/02/2019       Last ABG  POC Blood Gas: No results found for: POCPH, POCPCO2, POCPO2, POCHCO3, NBEA, MHMH7WIX  No results for input(s): PH, PCO2, PO2, HCO3, BE, O2SAT in the last 72 hours. Assessment:     · allergic rhinitis   · Asthma    Plan:        Problem List Items Addressed This Visit     Other allergic rhinitis     Controlled. Doing well with Flonase           Mild persistent asthma without complication     Controlled with Dulera. She is stepping up and down by increasing to 1 puff bid. May need 2 puff bid.   During best time of year, will decrease to 1 puff daily then try off. Time spent 25 min, and >50% of this time was spent in counseling patient as detailed above.

## 2019-04-16 NOTE — ASSESSMENT & PLAN NOTE
Controlled with Dulera. She is stepping up and down by increasing to 1 puff bid. May need 2 puff bid. During best time of year, will decrease to 1 puff daily then try off.

## 2019-04-18 ENCOUNTER — OFFICE VISIT (OUTPATIENT)
Dept: ORTHOPEDIC SURGERY | Age: 74
End: 2019-04-18
Payer: MEDICARE

## 2019-04-18 VITALS
RESPIRATION RATE: 16 BRPM | BODY MASS INDEX: 41.77 KG/M2 | HEIGHT: 62 IN | WEIGHT: 227 LBS | SYSTOLIC BLOOD PRESSURE: 133 MMHG | TEMPERATURE: 97 F | DIASTOLIC BLOOD PRESSURE: 73 MMHG | HEART RATE: 70 BPM

## 2019-04-18 DIAGNOSIS — M17.12 PRIMARY OSTEOARTHRITIS OF LEFT KNEE: ICD-10-CM

## 2019-04-18 DIAGNOSIS — M25.562 CHRONIC PAIN OF LEFT KNEE: Primary | ICD-10-CM

## 2019-04-18 DIAGNOSIS — G89.29 CHRONIC PAIN OF LEFT KNEE: Primary | ICD-10-CM

## 2019-04-18 PROCEDURE — G8399 PT W/DXA RESULTS DOCUMENT: HCPCS | Performed by: ORTHOPAEDIC SURGERY

## 2019-04-18 PROCEDURE — 1123F ACP DISCUSS/DSCN MKR DOCD: CPT | Performed by: ORTHOPAEDIC SURGERY

## 2019-04-18 PROCEDURE — 20610 DRAIN/INJ JOINT/BURSA W/O US: CPT | Performed by: ORTHOPAEDIC SURGERY

## 2019-04-18 PROCEDURE — 3017F COLORECTAL CA SCREEN DOC REV: CPT | Performed by: ORTHOPAEDIC SURGERY

## 2019-04-18 PROCEDURE — G8427 DOCREV CUR MEDS BY ELIG CLIN: HCPCS | Performed by: ORTHOPAEDIC SURGERY

## 2019-04-18 PROCEDURE — 4040F PNEUMOC VAC/ADMIN/RCVD: CPT | Performed by: ORTHOPAEDIC SURGERY

## 2019-04-18 PROCEDURE — 1090F PRES/ABSN URINE INCON ASSESS: CPT | Performed by: ORTHOPAEDIC SURGERY

## 2019-04-18 PROCEDURE — 99203 OFFICE O/P NEW LOW 30 MIN: CPT | Performed by: ORTHOPAEDIC SURGERY

## 2019-04-18 PROCEDURE — G8417 CALC BMI ABV UP PARAM F/U: HCPCS | Performed by: ORTHOPAEDIC SURGERY

## 2019-04-18 PROCEDURE — 1036F TOBACCO NON-USER: CPT | Performed by: ORTHOPAEDIC SURGERY

## 2019-04-25 ENCOUNTER — OFFICE VISIT (OUTPATIENT)
Dept: ORTHOPEDIC SURGERY | Age: 74
End: 2019-04-25
Payer: MEDICARE

## 2019-04-25 VITALS — BODY MASS INDEX: 41.59 KG/M2 | WEIGHT: 226 LBS | RESPIRATION RATE: 16 BRPM | HEIGHT: 62 IN

## 2019-04-25 DIAGNOSIS — M17.12 PRIMARY OSTEOARTHRITIS OF LEFT KNEE: Primary | ICD-10-CM

## 2019-04-25 PROCEDURE — 99999 PR OFFICE/OUTPT VISIT,PROCEDURE ONLY: CPT | Performed by: PHYSICIAN ASSISTANT

## 2019-04-25 PROCEDURE — 20610 DRAIN/INJ JOINT/BURSA W/O US: CPT | Performed by: PHYSICIAN ASSISTANT

## 2019-04-26 NOTE — PROGRESS NOTES
This dictation was done with Mobile Accord dictation and may contain mechanical errors related to translation. Blood pressure 133/73, pulse 70, temperature 97 °F (36.1 °C), temperature source Temporal, resp. rate 16, height 5' 1.5\" (1.562 m), weight 227 lb (103 kg), not currently breastfeeding.       Synvisc:  NDC: [address]-82  Lot Number: [id number]  Expiration Date: 4/20
ySlvester Ferguson MD   levothyroxine (SYNTHROID) 150 MCG tablet TAKE 1 TABLET BY MOUTH DAILY  Sylvester Freguson MD   Multiple Vitamins-Minerals (MULTIVITAMIN ADULT PO) Take 1 tablet by mouth daily Stopped for procedure  Historical Provider, MD Norma Underwood 200-5 MCG/ACT inhaler INHALE 2 PUFFS INTO THE LUNGS TWICE DAILY  Patient taking differently: INHALE 2 PUFFS INTO THE LUNGS DAILY  Siomara Edge MD   ezetimibe (ZETIA) 10 MG tablet TAKE 1 TABLET DAILY  Patient taking differently: TAKE 1 TABLET EVENING  Kira Wiseman MD   fluticasone (FLONASE) 50 MCG/ACT nasal spray 1 spray by Nasal route 2 times daily  Patient taking differently: 1 spray by Nasal route daily   Siomara Edge MD   albuterol sulfate  (90 Base) MCG/ACT inhaler Inhale 2 puffs into the lungs every 6 hours as needed for Wheezing  Sylvester Ferguson MD   dicyclomine (BENTYL) 20 MG tablet Take 1 tablet by mouth 4 times daily as needed (prn)  Sylvester Ferguson MD   cyanocobalamin ([employer]S VITAMIN B12) 1000 MCG tablet Take 1 tablet by mouth daily  Sylvester Ferguson MD   aspirin 81 MG tablet Take 81 mg by mouth daily. Historical Provider, MD     Physical examination 76-year-old female oriented ×3 temperature is 97.0. Patient has class III morbid obesity BMI 42.2. Examination of her back shows reduced range of motion but no specific pain tenderness or instability. Motor exam shows quadriceps hamstrings hip abductors and abductors foot plantar dorsiflexors are intact 4-5 over 5 to the left lower extremity. Sensation and perfusion are intact to the left lower extremity. There is no numbness or tingling noted in the left lower extremity. Deep tendon reflexes 0 to knee and 0 to ankle of the left lower extremity. Examination of the left knee shows mild tenderness to palpation both medially and laterally noted. Full extension to 120° of flexion are noted in the left knee.   There are no signs of instability or deep sepsis

## 2019-05-02 ENCOUNTER — OFFICE VISIT (OUTPATIENT)
Dept: ORTHOPEDIC SURGERY | Age: 74
End: 2019-05-02
Payer: MEDICARE

## 2019-05-02 VITALS — TEMPERATURE: 97.9 F

## 2019-05-02 DIAGNOSIS — M17.12 PRIMARY OSTEOARTHRITIS OF LEFT KNEE: Primary | ICD-10-CM

## 2019-05-02 PROCEDURE — 20610 DRAIN/INJ JOINT/BURSA W/O US: CPT | Performed by: PHYSICIAN ASSISTANT

## 2019-05-02 RX ORDER — MELOXICAM 7.5 MG/1
7.5 TABLET ORAL DAILY
Qty: 30 TABLET | Refills: 3 | Status: SHIPPED | OUTPATIENT
Start: 2019-05-02 | End: 2019-05-23 | Stop reason: CLARIF

## 2019-05-02 NOTE — PROGRESS NOTES
Subjective:  She is here for repeat Synvisc injection #3  in a series of 3 injections for the  left knee. Objective:   Temperature 97.9 °F (36.6 °C), temperature source Temporal, not currently breastfeeding. There is a mild joint effusion noted of the left knee. Assessment:    ICD-10-CM    1. Primary osteoarthritis of left knee M17.12 WA ARTHROCENTESIS ASPIR&/INJ MAJOR JT/BURSA W/O US     WA SYNVISC OR SYNVISC-ONE       Plan:  Proceed with Synvisc injection #3. Rx Mobic 7.5 mg qd. PROCEDURE NOTE:  PRE-PROCEDURE DIAGNOSIS: DJD knee  POST-PROCEDURE DIAGNOSIS: DJD knee    With the patient's permission, her left knee was prepped in standard sterile fashion with  Alcohol. The prefilled injection of the preferred visco supplementation ( Synvisc 2 ml ) was injected into the lateral joint space compartment without difficulty. The patient tolerated the procedure(s) well without difficulty. A band-aid was applied. POST-PROCEDURE INSTRUCTIONS GIVEN TO PATIENT:   She was advised to ice the knee for 15-20 minutes to relieve any injection site related pain. Decrease activity for the next 24 to 48 hours. May use prescription or OTC pain relievers as needed    FOLLOW-UP:   As directed or call or return to clinic if these symptoms worsen or fail to improve as anticipated. If at any time you are concerned you may contact the office for further instructions or care.

## 2019-05-03 NOTE — PROGRESS NOTES
This dictation was done with Dragon dictation and may contain mechanical errors related to translation. Resp. rate 16, height 5' 1.5\" (1.562 m), weight 226 lb (102.5 kg), not currently breastfeeding. This is a pleasant 42-year-old female who is here for her second in a series of 3 Synvisc injections for her left knee    Subjective:  left knee pain. She is here for  2nd Synvisc injection for the  left knee(s). Objective:   Resp. rate 16, height 5' 1.5\" (1.562 m), weight 226 lb (102.5 kg), not currently breastfeeding. There is a milld joint effusion noted of the left knee(s). There is moderate pain with range of motion testing. There is no significant  instability noted. Neuro exam grossly intact both lower extremities. Intact sensation to light touch. Motor exam 4+ to 5/5 in all major motor groups. Negative Siegel's sign. Skin is warm, dry and intact with out erythema or significant increased temperature around the knee joint(s). Assessment:  Degenerative Joint Disease of the left Knee(s). Plan:  Discussed Synvisc injections including all  risks and benefits including increased pain, drug reaction, infection, bleeding, lack of improvement and  neurovascular injury. All the questions were answered. PROCEDURE NOTE:  PRE-PROCEDURE DIAGNOSIS: DJD knee  POST-PROCEDURE DIAGNOSIS: DJD knee    With the patient's permission, her left knee was prepped in standard sterile fashion with  Alcohol. The prefilled injection of the preferred Synvisc was injected into the left lateral joint space compartment without difficulty. The patient tolerated the procedure(s) well without difficulty. A band-aid was applied. POST-PROCEDURE INSTRUCTIONS GIVEN TO PATIENT: The patient was advised to ice the knee for 15-20 minutes to relieve any injection site related pain. Decrease activity for the next 24 to 48 hours.      FOLLOW-UP:   As directed or call or return to clinic if these symptoms worsen or fail

## 2019-06-25 ENCOUNTER — OFFICE VISIT (OUTPATIENT)
Dept: ORTHOPEDIC SURGERY | Age: 74
End: 2019-06-25
Payer: MEDICARE

## 2019-06-25 VITALS
HEART RATE: 57 BPM | SYSTOLIC BLOOD PRESSURE: 148 MMHG | HEIGHT: 62 IN | WEIGHT: 219 LBS | BODY MASS INDEX: 40.3 KG/M2 | DIASTOLIC BLOOD PRESSURE: 64 MMHG

## 2019-06-25 DIAGNOSIS — M17.12 PRIMARY OSTEOARTHRITIS OF LEFT KNEE: Primary | ICD-10-CM

## 2019-06-25 PROCEDURE — 1036F TOBACCO NON-USER: CPT | Performed by: PHYSICIAN ASSISTANT

## 2019-06-25 PROCEDURE — 4040F PNEUMOC VAC/ADMIN/RCVD: CPT | Performed by: PHYSICIAN ASSISTANT

## 2019-06-25 PROCEDURE — 1090F PRES/ABSN URINE INCON ASSESS: CPT | Performed by: PHYSICIAN ASSISTANT

## 2019-06-25 PROCEDURE — 3017F COLORECTAL CA SCREEN DOC REV: CPT | Performed by: PHYSICIAN ASSISTANT

## 2019-06-25 PROCEDURE — G8427 DOCREV CUR MEDS BY ELIG CLIN: HCPCS | Performed by: PHYSICIAN ASSISTANT

## 2019-06-25 PROCEDURE — G8417 CALC BMI ABV UP PARAM F/U: HCPCS | Performed by: PHYSICIAN ASSISTANT

## 2019-06-25 PROCEDURE — 99212 OFFICE O/P EST SF 10 MIN: CPT | Performed by: PHYSICIAN ASSISTANT

## 2019-06-25 PROCEDURE — 1123F ACP DISCUSS/DSCN MKR DOCD: CPT | Performed by: PHYSICIAN ASSISTANT

## 2019-06-25 PROCEDURE — G8399 PT W/DXA RESULTS DOCUMENT: HCPCS | Performed by: PHYSICIAN ASSISTANT

## 2019-06-26 NOTE — PROGRESS NOTES
HCT) 80-25 MG per tablet TAKE 1 TABLET BY MOUTH DAILY 90 tablet 3    levothyroxine (SYNTHROID) 150 MCG tablet TAKE 1 TABLET BY MOUTH DAILY 90 tablet 3    Multiple Vitamins-Minerals (MULTIVITAMIN ADULT PO) Take 1 tablet by mouth daily Stopped for procedure      DULERA 200-5 MCG/ACT inhaler INHALE 2 PUFFS INTO THE LUNGS TWICE DAILY (Patient taking differently: INHALE 2 PUFFS INTO THE LUNGS DAILY) 3 Inhaler 1    fluticasone (FLONASE) 50 MCG/ACT nasal spray 1 spray by Nasal route 2 times daily (Patient taking differently: 1 spray by Nasal route daily ) 1 Bottle 3    albuterol sulfate  (90 Base) MCG/ACT inhaler Inhale 2 puffs into the lungs every 6 hours as needed for Wheezing 1 Inhaler 4    dicyclomine (BENTYL) 20 MG tablet Take 1 tablet by mouth 4 times daily as needed (prn) 90 tablet 1    cyanocobalamin ([employer]S VITAMIN B12) 1000 MCG tablet Take 1 tablet by mouth daily 30 tablet 3    aspirin 81 MG tablet Take 81 mg by mouth daily. No current facility-administered medications for this visit. Objective:   She   is alert, oriented x 3, pleasant, well nourished, developed and in no acute distress. BP (!) 148/64   Pulse 57   Ht 5' 1.5\" (1.562 m)   Wt 219 lb (99.3 kg)   BMI 40.71 kg/m²      KNEE EXAM:  Examination of the left knee shows: There is not erythema. There no soft tissue swelling. There is no effusion. There mild pain associated with ROM testing. Extensor Mechanism is  intact. X Rays: not performed in the office today:     Assessment:       ICD-10-CM    1. Primary osteoarthritis of left knee M17.12              Plan:     She has moderately advanced arthritis of the left knee. She understands that the cortisone injection is not more care of her arthritic symptoms merely help with the pain and swelling associated with knee arthritis. The natural history of the patient's diagnosis as well as the treatment options were discussed in full and questions were answered. Risks and benefits of the treatment options also reviewed in detail. Weight loss, activity modification, home exercise therapy program, NSAID'S, dietary changes have been discussed as a means to help control the symptoms. She  was advised that NSAID-type medications have two very important potential side effects: gastrointestinal irritation including hemorrhage and renal injuries. She was asked to take the medication with food and to stop if she experiences any GI upset. I asked her to call for vomiting, abdominal pain or black/bloody stools. She should have renal function testing per his medical provider periodically. The patient expresses understanding of these issues and questions were answered. She has a pound weight loss since last visit. Encouraged continued weight management. Follow Up:  Call or return to clinic prn if these symptoms worsen or fail to improve as anticipated.

## 2019-07-15 DIAGNOSIS — J45.20 MILD INTERMITTENT ASTHMA WITHOUT COMPLICATION: ICD-10-CM

## 2019-07-23 RX ORDER — MOMETASONE FUROATE AND FORMOTEROL FUMARATE DIHYDRATE 200; 5 UG/1; UG/1
AEROSOL RESPIRATORY (INHALATION)
Qty: 13 G | Refills: 0 | Status: SHIPPED | OUTPATIENT
Start: 2019-07-23 | End: 2019-08-02 | Stop reason: CLARIF

## 2019-08-02 ENCOUNTER — HOSPITAL ENCOUNTER (OUTPATIENT)
Age: 74
Discharge: HOME | End: 2019-08-02
Payer: MEDICARE

## 2019-08-02 ENCOUNTER — HOSPITAL ENCOUNTER (OUTPATIENT)
Dept: GENERAL RADIOLOGY | Age: 74
Discharge: HOME | End: 2019-08-02
Payer: MEDICARE

## 2019-08-02 DIAGNOSIS — M54.5 LOW BACK PAIN, UNSPECIFIED BACK PAIN LATERALITY, UNSPECIFIED CHRONICITY, WITH SCIATICA PRESENCE UNSPECIFIED: ICD-10-CM

## 2019-08-02 PROCEDURE — 72100 X-RAY EXAM L-S SPINE 2/3 VWS: CPT

## 2019-08-19 ENCOUNTER — TELEPHONE (OUTPATIENT)
Dept: ORTHOPEDIC SURGERY | Age: 74
End: 2019-08-19

## 2019-08-19 ENCOUNTER — OFFICE VISIT (OUTPATIENT)
Dept: ORTHOPEDIC SURGERY | Age: 74
End: 2019-08-19
Payer: MEDICARE

## 2019-08-19 VITALS
DIASTOLIC BLOOD PRESSURE: 72 MMHG | BODY MASS INDEX: 40.3 KG/M2 | WEIGHT: 219 LBS | SYSTOLIC BLOOD PRESSURE: 137 MMHG | TEMPERATURE: 98.2 F | HEART RATE: 57 BPM | HEIGHT: 62 IN

## 2019-08-19 DIAGNOSIS — M17.12 PRIMARY OSTEOARTHRITIS OF LEFT KNEE: Primary | ICD-10-CM

## 2019-08-19 DIAGNOSIS — M17.12 PRIMARY OSTEOARTHRITIS OF LEFT KNEE: ICD-10-CM

## 2019-08-19 LAB
ALBUMIN SERPL-MCNC: 4.5 G/DL (ref 3.4–5)
ANION GAP SERPL CALCULATED.3IONS-SCNC: 14 MMOL/L (ref 3–16)
APTT: 28.2 SEC (ref 26–36)
BASOPHILS ABSOLUTE: 0 K/UL (ref 0–0.2)
BASOPHILS RELATIVE PERCENT: 0.8 %
BUN BLDV-MCNC: 19 MG/DL (ref 7–20)
CALCIUM SERPL-MCNC: 9.8 MG/DL (ref 8.3–10.6)
CHLORIDE BLD-SCNC: 99 MMOL/L (ref 99–110)
CO2: 27 MMOL/L (ref 21–32)
CREAT SERPL-MCNC: 0.7 MG/DL (ref 0.6–1.2)
EOSINOPHILS ABSOLUTE: 0 K/UL (ref 0–0.6)
EOSINOPHILS RELATIVE PERCENT: 0.4 %
ESTIMATED AVERAGE GLUCOSE: 116.9 MG/DL
GFR AFRICAN AMERICAN: >60
GFR NON-AFRICAN AMERICAN: >60
GLUCOSE BLD-MCNC: 96 MG/DL (ref 70–99)
HBA1C MFR BLD: 5.7 %
HCT VFR BLD CALC: 38 % (ref 36–48)
HEMOGLOBIN: 12.4 G/DL (ref 12–16)
INR BLD: 0.99 (ref 0.86–1.14)
LYMPHOCYTES ABSOLUTE: 1.4 K/UL (ref 1–5.1)
LYMPHOCYTES RELATIVE PERCENT: 29.3 %
MCH RBC QN AUTO: 27.4 PG (ref 26–34)
MCHC RBC AUTO-ENTMCNC: 32.6 G/DL (ref 31–36)
MCV RBC AUTO: 83.9 FL (ref 80–100)
MONOCYTES ABSOLUTE: 0.4 K/UL (ref 0–1.3)
MONOCYTES RELATIVE PERCENT: 8.5 %
NEUTROPHILS ABSOLUTE: 3 K/UL (ref 1.7–7.7)
NEUTROPHILS RELATIVE PERCENT: 61 %
PDW BLD-RTO: 13.4 % (ref 12.4–15.4)
PLATELET # BLD: 196 K/UL (ref 135–450)
PMV BLD AUTO: 8.2 FL (ref 5–10.5)
POTASSIUM SERPL-SCNC: 4 MMOL/L (ref 3.5–5.1)
PROTHROMBIN TIME: 11.3 SEC (ref 9.8–13)
RBC # BLD: 4.53 M/UL (ref 4–5.2)
SODIUM BLD-SCNC: 140 MMOL/L (ref 136–145)
TRANSFERRIN: 367 MG/DL (ref 200–360)
VITAMIN D 25-HYDROXY: 16.5 NG/ML
WBC # BLD: 4.9 K/UL (ref 4–11)

## 2019-08-19 PROCEDURE — 1123F ACP DISCUSS/DSCN MKR DOCD: CPT | Performed by: ORTHOPAEDIC SURGERY

## 2019-08-19 PROCEDURE — 1090F PRES/ABSN URINE INCON ASSESS: CPT | Performed by: ORTHOPAEDIC SURGERY

## 2019-08-19 PROCEDURE — G8427 DOCREV CUR MEDS BY ELIG CLIN: HCPCS | Performed by: ORTHOPAEDIC SURGERY

## 2019-08-19 PROCEDURE — G8399 PT W/DXA RESULTS DOCUMENT: HCPCS | Performed by: ORTHOPAEDIC SURGERY

## 2019-08-19 PROCEDURE — 20610 DRAIN/INJ JOINT/BURSA W/O US: CPT | Performed by: ORTHOPAEDIC SURGERY

## 2019-08-19 PROCEDURE — 1036F TOBACCO NON-USER: CPT | Performed by: ORTHOPAEDIC SURGERY

## 2019-08-19 PROCEDURE — 4040F PNEUMOC VAC/ADMIN/RCVD: CPT | Performed by: ORTHOPAEDIC SURGERY

## 2019-08-19 PROCEDURE — 99214 OFFICE O/P EST MOD 30 MIN: CPT | Performed by: ORTHOPAEDIC SURGERY

## 2019-08-19 PROCEDURE — 3017F COLORECTAL CA SCREEN DOC REV: CPT | Performed by: ORTHOPAEDIC SURGERY

## 2019-08-19 PROCEDURE — G8417 CALC BMI ABV UP PARAM F/U: HCPCS | Performed by: ORTHOPAEDIC SURGERY

## 2019-08-19 NOTE — TELEPHONE ENCOUNTER
Vitamin D level is low at 16.5. Instructed patient to take over-the-counter Vitamin D 8218-4475 IUs daily.

## 2019-08-19 NOTE — LETTER
[address] [facility]  Surgery Scheduling Form:  [facility]  DEMOGRAPHICS:                                                                                                                Patient Name:  Karen Pizarro  Patient :  1945   Patient SS#:      Patient Phone:  [phone] (home)                            Patient Address:   [address] [place] [id number]    PCP:  Mary Miranda MD  Insurance:    Payor/Plan Subscr  Sex Relation Sub. Ins. ID Effective Group Num   1. [address] [facility] 1945 Female  [id number] 1/1/15                                    [address] [address]   2. [address] [facility] 1945 Female  [id number] 1/1/15 104                                   [address]     DIAGNOSIS & PROCEDURE:                                                                                              Diagnosis:     Left arthritis knee   M17.12  Operation:  Left Total Knee Replacement robotic assisted    [id number]  Location:  [place] [address]  Surgeon:  Theresa James. Nohelia Molina MD    SCHEDULING INFORMATION:                                                                                         .  Surgeon's Scheduling Instruction:  3 months    Requested Date: CXL  OR Time:   Patient Arrival Time:      OR Time Required:  90  Minutes  Anesthesia:  Choice  Equipment:  Rehana THAIS, standard  Mini C-Arm:  No   Standard C-Arm:  No  Status:  Same day admit  PAT Required:  Yes  Comments: NO femoral nerve block   ALLERGIES:Montelukast sodium and Sulfa antibiotics                     Gerry Paredes MD      19 8:43 AM  BILLING INFORMATION:                                                                                                     Procedure:       CPT Code Modifier   Left Total Knee Replacement robotic assisted    With Christine Preciado [facility]         H&P AT:       PCP  XX                      URGENT CARE [facility]   Left Total Knee Replacement robotic assisted 1945     PHYSICIANS ORDERS  HEIGHT;   5'1          WEIGHT:  219      ALLERGIES:Montelukast sodium and Sulfa antibiotics                                                                   SUR-26                      JET:    _________________________________________________________________  PRE-OP ORDERS:  ? CBC WITH DIFFERENTIAL                                                ? TYPE AND SCREEN                                                            ? HgB A1C                                                                               ? EKG                                                                                        ? NASAL CULUTRE MRSA  ? UAR/if positive repeat UAR on admission  ? BMP           ALBUMIN AND PREALBUMIN           VITAMIN D LEVELS  ? COAG PROFILE  ? SED RATE  ? PT/OT EVAL AND TEACHING  ? INSTRUCT PT TO STOP ALL NSAIDS, ASPIRIN, BLOOD THINNERS 7 DAYS PRIOR SURGERY  DAY OF SURGERY  ? CEFAZOLIN 2 GM IVPB; IF PATIENT WEIGHS > 80 KG AND SERUM CREATININE <2.5 mg/dl, GIVE 2 GM DOSE WITHIN 1 HOUR OF INCISION. ? IF THE PRE-OP NASAL CULTURE FOR MRSA WAS POSITIVE:   REPEAT NASAL SWAB ON ADMISSION AND ADMINISTER VANCOMYCIN 1 GM IVPB, REDUCE THE DOSE OF VANCOMYCIN  MG IVPB IF PT < 55 KG OR SERUM CREATININE > 2mg/dl; also to get Cefazolin 2 GM or wt based  ? All patients will receive preop Cefazolin 2 GM or wt based   ? APPLY KNEE HIGH ANTI-EMBOLIC AND PNEUMO-BOOTS TO UNOPERATIVE  LEG  ? CELEBREX  200 MG  ORALLY  DAY OF SURGERY  ?  ROXICODONE 10MG  ORALLY DAY OF SURGERY  OTHER ORDERS:_______________________________________________________PHYSICIAN SIGNATURE: __ 19                                                                                                       8:43 AM  ________________________DATE:                          Supplemental Confidentiality & Payment Agreement & Supplemental HIPAA

## 2019-08-19 NOTE — PROGRESS NOTES
This dictation was done with [employer] dictation and may contain mechanical errors related to translation. Blood pressure 137/72, pulse 57, temperature 98.2 °F (36.8 °C), temperature source Temporal, height 5' 1.5\" (1.562 m), weight 219 lb (99.3 kg), not currently breastfeeding.

## 2019-08-20 LAB
BILIRUBIN URINE: NEGATIVE
BLOOD, URINE: NEGATIVE
CLARITY: CLEAR
COLOR: YELLOW
GLUCOSE URINE: NEGATIVE MG/DL
KETONES, URINE: NEGATIVE MG/DL
LEUKOCYTE ESTERASE, URINE: NEGATIVE
MICROSCOPIC EXAMINATION: NORMAL
NITRITE, URINE: NEGATIVE
PH UA: 6 (ref 5–8)
PROTEIN UA: NEGATIVE MG/DL
SPECIFIC GRAVITY UA: 1.01 (ref 1–1.03)
URINE REFLEX TO CULTURE: NORMAL
URINE TYPE: NORMAL
UROBILINOGEN, URINE: 0.2 E.U./DL

## 2019-08-24 NOTE — PROGRESS NOTES
Patient is a 25-year-old female who presents today for further evaluation treatment of left knee arthritis. She has been seen in the past and is undergone physical therapy anti-inflammatories and multiple cortisone injections. She also has undergone Synvisc injection on 5/2/2019. She now continues to have increasing pain difficulty with ambulation and swelling with loss of range of motion. Patient states that she is unable to walk a full city block before she has to stop due to knee pain. Patient has no history of injury to the knee. Patient has undergone arthroscopic evaluation and debridement of the knee in 2015. Patient does not have diabetes does not smoke currently is not taking any oral narcotics or hormone replacement drugs. Patient states she has no metal allergies no dental problems no psychiatric history. Patient has back pain but has not had any surgery. There are no psychiatric issues. The patient has class III morbid obesity BMI of 40.71. She is here to discuss possible total knee arthroplasty. Patient Active Problem List   Diagnosis    Essential hypertension, benign    Hypothyroidism    Degenerative joint disease    Anxiety    Mild persistent asthma without complication    Hyperlipemia    B12 deficiency    Morbid obesity due to excess calories ([facility] [address]    Other allergic rhinitis     Prior to Visit Medications    Medication Sig Taking?  Authorizing Provider   PROAIR  (15 Base) MCG/ACT inhaler USE 2 INHALATIONS ORALLY   INTO THE LUNGS EVERY 6     HOURS AS NEEDEDFOR        WHEEZING  Gerhardt Annas, MD   ezetimibe (ZETIA) 10 MG tablet TAKE 1 TABLET DAILY  Gerhardt Annas, MD   nebivolol (BYSTOLIC) 5 MG tablet Take 1 tablet by mouth daily  Gerhardt Annas, MD   telmisartan-hydrochlorothiazide (MICARDIS HCT) 80-25 MG per tablet TAKE 1 TABLET BY MOUTH DAILY  Gerhardt Annas, MD   levothyroxine (SYNTHROID) 150 MCG tablet TAKE 1 TABLET BY MOUTH DAILY  Jax Flower

## 2019-09-20 ENCOUNTER — HOSPITAL ENCOUNTER (OUTPATIENT)
Dept: PHYSICAL THERAPY | Age: 74
Setting detail: THERAPIES SERIES
Discharge: HOME | End: 2019-09-20
Payer: MEDICARE

## 2019-09-20 PROCEDURE — 97530 THERAPEUTIC ACTIVITIES: CPT

## 2019-09-20 PROCEDURE — 97162 PT EVAL MOD COMPLEX 30 MIN: CPT

## 2019-09-20 PROCEDURE — 97110 THERAPEUTIC EXERCISES: CPT

## 2019-09-20 NOTE — PLAN OF CARE
Outpatient Physical Therapy  [] [facility]    Phone: [phone]   Fax: [phone]   [x] [facility]  Phone: [phone]              Fax: [phone]  [] [facility]   Phone: [phone]   Fax: [phone]     To: Referring Practitioner: Dr. Adrián Hernandez      Patient: Adan Garsia   : 1945   MRN: [id number]  Evaluation Date: 2019      Diagnosis Information:  · Diagnosis: Prehab- knee   · Treatment Diagnosis: Decreased left knee ROM, strength. Pain     Physical Therapy Certification/Re-Certification Form  Dear Dr. Adrián Hernandez,  The following patient has been evaluated for physical therapy services and for therapy to continue, Medicare requires monthly physician review of the treatment plan. Please review the attached evaluation and/or summary of the patient's plan of care, and verify that you agree therapy should continue by signing the attached document and sending it back to our office. Plan of Care/Treatment to date:  [x] Therapeutic Exercise    [] Modalities:  [x] Therapeutic Activity     [] Ultrasound  [] Electrical Stimulation  [x] Gait Training      [] Cervical Traction [] Lumbar Traction  [] Neuromuscular Re-education    [] Cold/hotpack [] Iontophoresis   [x] Instruction in HEP     Other:  [] Manual Therapy      []             [] Aquatic Therapy      []           ? Frequency/Duration:  # Days per week: [x] 1 day # Weeks: [x] 1 week [] 5 weeks     [] 2 days? [] 2 weeks [] 6 weeks     [] 3 days   [] 3 weeks [] 7 weeks     [] 4 days   [] 4 weeks [] 8 weeks    Rehab Potential: [] Excellent [x] Good [] Fair  [] Poor       Electronically signed by:  Jimy Chang PT      If you have any questions or concerns, please don't hesitate to call.   Thank you for your referral.      Physician Signature:________________________________Date:__________________  By signing above, therapists plan is approved by physician

## 2019-09-20 NOTE — PROGRESS NOTES
EXPECTED DISCHARGE  [] Home w/ Home Health Care   FUNCTIONAL RISK:   [] Low      [x] Medium     [] High                DISPOSITION:         [] Home w/ Outpatient PT                                                                                                                      [x] SNF / Inpatient Rehab       ASSESSMENT:     Conditions Requiring Skilled Therapeutic Intervention  Body structures, Functions, Activity limitations: Decreased functional mobility , Decreased ADL status, Decreased ROM, Decreased strength, Increased Pain  Assessment: Pt to have left TKR in November- moderate levels of pain affecting gait, transfers, ROM   Treatment Diagnosis: Decreased left knee ROM, strength. Pain  Prognosis: Good, Excellent  Decision Making: Medium Complexity  REQUIRES PT FOLLOW UP: Yes  Treatment Initiated : Pt was educated on what to expect follow TKR surgery with regards the therapy, use of assistive device, discharge planning, home set up, and functional abilities. . Pt was given handout of Dr. Maday Salas TKR exercises and patient performed: Ankle Pump, HS and Gastroc Stretch, Quad Set, SLR, Heel Slide, LAQ, Mini Squat.   Decision Making: Decision Making: Medium Complexity    Goals:                 G-Code (if applicable):                Plan:  Plan  Times per week: 2  Times per day: Daily  Plan weeks: 6  Current Treatment Recommendations: Strengthening, Gait Training, ROM, Pain Management, Home Exercise Program, Functional Mobility Training    Therapy Time   Individual   Time In      Time Out     Minutes          Signature:  Felipe Dominguez PT

## 2019-09-24 ENCOUNTER — OFFICE VISIT (OUTPATIENT)
Dept: PULMONOLOGY | Age: 74
End: 2019-09-24
Payer: MEDICARE

## 2019-09-24 VITALS
TEMPERATURE: 97.7 F | RESPIRATION RATE: 16 BRPM | SYSTOLIC BLOOD PRESSURE: 136 MMHG | OXYGEN SATURATION: 94 % | HEIGHT: 62 IN | WEIGHT: 217 LBS | BODY MASS INDEX: 39.93 KG/M2 | HEART RATE: 76 BPM | DIASTOLIC BLOOD PRESSURE: 70 MMHG

## 2019-09-24 DIAGNOSIS — J45.30 MILD PERSISTENT ASTHMA WITHOUT COMPLICATION: ICD-10-CM

## 2019-09-24 DIAGNOSIS — G47.33 OSA (OBSTRUCTIVE SLEEP APNEA): ICD-10-CM

## 2019-09-24 DIAGNOSIS — J30.89 OTHER ALLERGIC RHINITIS: Primary | ICD-10-CM

## 2019-09-24 PROCEDURE — 4040F PNEUMOC VAC/ADMIN/RCVD: CPT | Performed by: INTERNAL MEDICINE

## 2019-09-24 PROCEDURE — 1123F ACP DISCUSS/DSCN MKR DOCD: CPT | Performed by: INTERNAL MEDICINE

## 2019-09-24 PROCEDURE — 99214 OFFICE O/P EST MOD 30 MIN: CPT | Performed by: INTERNAL MEDICINE

## 2019-09-24 PROCEDURE — G8417 CALC BMI ABV UP PARAM F/U: HCPCS | Performed by: INTERNAL MEDICINE

## 2019-09-24 PROCEDURE — 1036F TOBACCO NON-USER: CPT | Performed by: INTERNAL MEDICINE

## 2019-09-24 PROCEDURE — G8399 PT W/DXA RESULTS DOCUMENT: HCPCS | Performed by: INTERNAL MEDICINE

## 2019-09-24 PROCEDURE — 3017F COLORECTAL CA SCREEN DOC REV: CPT | Performed by: INTERNAL MEDICINE

## 2019-09-24 PROCEDURE — G8427 DOCREV CUR MEDS BY ELIG CLIN: HCPCS | Performed by: INTERNAL MEDICINE

## 2019-09-24 PROCEDURE — 1090F PRES/ABSN URINE INCON ASSESS: CPT | Performed by: INTERNAL MEDICINE

## 2019-09-24 ASSESSMENT — ASTHMA QUESTIONNAIRES
QUESTION_3 LAST FOUR WEEKS HOW OFTEN DID YOUR ASTHMA SYMPTOMS (WHEEZING, COUGHING, SHORTNESS OF BREATH, CHEST TIGHTNESS OR PAIN) WAKE YOU UP AT NIGHT OR EARLIER THAN USUAL IN THE MORNING: 5
QUESTION_5 LAST FOUR WEEKS HOW WOULD YOU RATE YOUR ASTHMA CONTROL: 5
QUESTION_4 LAST FOUR WEEKS HOW OFTEN HAVE YOU USED YOUR RESCUE INHALER OR NEBULIZER MEDICATION (SUCH AS ALBUTEROL): 4
ACT_TOTALSCORE: 22
QUESTION_2 LAST FOUR WEEKS HOW OFTEN HAVE YOU HAD SHORTNESS OF BREATH: 4
QUESTION_1 LAST FOUR WEEKS HOW MUCH OF THE TIME DID YOUR ASTHMA KEEP YOU FROM GETTING AS MUCH DONE AT WORK, SCHOOL OR AT HOME: 4

## 2019-09-24 NOTE — ASSESSMENT & PLAN NOTE
She continues to be well controlled. Using Dulera 200 in a step up and down manor. She has weaned to 1 puff daily. She will wean off and restart as needed. The patient expressed understanding for all.

## 2019-10-14 ENCOUNTER — HOSPITAL ENCOUNTER (OUTPATIENT)
Dept: CT IMAGING | Age: 74
Discharge: HOME | DRG: 065 | End: 2019-10-14
Payer: MEDICARE

## 2019-10-14 DIAGNOSIS — M17.12 PRIMARY OSTEOARTHRITIS OF LEFT KNEE: ICD-10-CM

## 2019-10-14 PROCEDURE — 73700 CT LOWER EXTREMITY W/O DYE: CPT

## 2019-10-17 ENCOUNTER — APPOINTMENT (OUTPATIENT)
Dept: MRI IMAGING | Age: 74
DRG: 065 | End: 2019-10-17
Payer: MEDICARE

## 2019-10-17 ENCOUNTER — APPOINTMENT (OUTPATIENT)
Dept: CT IMAGING | Age: 74
DRG: 065 | End: 2019-10-17
Payer: MEDICARE

## 2019-10-17 ENCOUNTER — HOSPITAL ENCOUNTER (INPATIENT)
Age: 74
LOS: 2 days | Discharge: HOME | DRG: 065 | End: 2019-10-19
Attending: EMERGENCY MEDICINE | Admitting: INTERNAL MEDICINE
Payer: MEDICARE

## 2019-10-17 DIAGNOSIS — R51.9 ACUTE NONINTRACTABLE HEADACHE, UNSPECIFIED HEADACHE TYPE: Primary | ICD-10-CM

## 2019-10-17 DIAGNOSIS — R29.898 RIGHT LEG WEAKNESS: ICD-10-CM

## 2019-10-17 PROBLEM — G45.9 TIA (TRANSIENT ISCHEMIC ATTACK): Status: ACTIVE | Noted: 2019-10-17

## 2019-10-17 LAB
ANION GAP SERPL CALCULATED.3IONS-SCNC: 13 MMOL/L (ref 3–16)
BASOPHILS ABSOLUTE: 0.1 K/UL (ref 0–0.2)
BASOPHILS RELATIVE PERCENT: 0.9 %
BUN BLDV-MCNC: 16 MG/DL (ref 7–20)
CALCIUM SERPL-MCNC: 9.2 MG/DL (ref 8.3–10.6)
CHLORIDE BLD-SCNC: 98 MMOL/L (ref 99–110)
CO2: 25 MMOL/L (ref 21–32)
CREAT SERPL-MCNC: 0.8 MG/DL (ref 0.6–1.2)
EKG ATRIAL RATE: 56 BPM
EKG DIAGNOSIS: NORMAL
EKG P AXIS: 76 DEGREES
EKG P-R INTERVAL: 196 MS
EKG Q-T INTERVAL: 440 MS
EKG QRS DURATION: 84 MS
EKG QTC CALCULATION (BAZETT): 424 MS
EKG R AXIS: 24 DEGREES
EKG T AXIS: 30 DEGREES
EKG VENTRICULAR RATE: 56 BPM
EOSINOPHILS ABSOLUTE: 0 K/UL (ref 0–0.6)
EOSINOPHILS RELATIVE PERCENT: 0.6 %
GFR AFRICAN AMERICAN: >60
GFR NON-AFRICAN AMERICAN: >60
GLUCOSE BLD-MCNC: 101 MG/DL (ref 70–99)
HCT VFR BLD CALC: 34.5 % (ref 36–48)
HEMOGLOBIN: 11.3 G/DL (ref 12–16)
LV EF: 58 %
LVEF MODALITY: NORMAL
LYMPHOCYTES ABSOLUTE: 1.8 K/UL (ref 1–5.1)
LYMPHOCYTES RELATIVE PERCENT: 32.4 %
MCH RBC QN AUTO: 27.4 PG (ref 26–34)
MCHC RBC AUTO-ENTMCNC: 32.8 G/DL (ref 31–36)
MCV RBC AUTO: 83.4 FL (ref 80–100)
MONOCYTES ABSOLUTE: 0.6 K/UL (ref 0–1.3)
MONOCYTES RELATIVE PERCENT: 11.2 %
NEUTROPHILS ABSOLUTE: 3 K/UL (ref 1.7–7.7)
NEUTROPHILS RELATIVE PERCENT: 54.9 %
PDW BLD-RTO: 15.1 % (ref 12.4–15.4)
PLATELET # BLD: 179 K/UL (ref 135–450)
PMV BLD AUTO: 7.4 FL (ref 5–10.5)
POTASSIUM SERPL-SCNC: 3.5 MMOL/L (ref 3.5–5.1)
RBC # BLD: 4.13 M/UL (ref 4–5.2)
SODIUM BLD-SCNC: 136 MMOL/L (ref 136–145)
WBC # BLD: 5.4 K/UL (ref 4–11)

## 2019-10-17 PROCEDURE — 94760 N-INVAS EAR/PLS OXIMETRY 1: CPT

## 2019-10-17 PROCEDURE — 2060000000 HC ICU INTERMEDIATE R&B

## 2019-10-17 PROCEDURE — 70551 MRI BRAIN STEM W/O DYE: CPT

## 2019-10-17 PROCEDURE — 2580000003 HC RX 258: Performed by: INTERNAL MEDICINE

## 2019-10-17 PROCEDURE — 97161 PT EVAL LOW COMPLEX 20 MIN: CPT | Performed by: PHYSICAL THERAPIST

## 2019-10-17 PROCEDURE — 97116 GAIT TRAINING THERAPY: CPT | Performed by: PHYSICAL THERAPIST

## 2019-10-17 PROCEDURE — 97166 OT EVAL MOD COMPLEX 45 MIN: CPT

## 2019-10-17 PROCEDURE — 85025 COMPLETE CBC W/AUTO DIFF WBC: CPT

## 2019-10-17 PROCEDURE — 6360000004 HC RX CONTRAST MEDICATION: Performed by: PHYSICIAN ASSISTANT

## 2019-10-17 PROCEDURE — 70496 CT ANGIOGRAPHY HEAD: CPT

## 2019-10-17 PROCEDURE — 97530 THERAPEUTIC ACTIVITIES: CPT | Performed by: PHYSICAL THERAPIST

## 2019-10-17 PROCEDURE — 94640 AIRWAY INHALATION TREATMENT: CPT

## 2019-10-17 PROCEDURE — 6360000002 HC RX W HCPCS: Performed by: INTERNAL MEDICINE

## 2019-10-17 PROCEDURE — 80048 BASIC METABOLIC PNL TOTAL CA: CPT

## 2019-10-17 PROCEDURE — 70450 CT HEAD/BRAIN W/O DYE: CPT

## 2019-10-17 PROCEDURE — 99285 EMERGENCY DEPT VISIT HI MDM: CPT

## 2019-10-17 PROCEDURE — 6370000000 HC RX 637 (ALT 250 FOR IP): Performed by: INTERNAL MEDICINE

## 2019-10-17 PROCEDURE — 92610 EVALUATE SWALLOWING FUNCTION: CPT

## 2019-10-17 PROCEDURE — 93010 ELECTROCARDIOGRAM REPORT: CPT | Performed by: INTERNAL MEDICINE

## 2019-10-17 PROCEDURE — 93306 TTE W/DOPPLER COMPLETE: CPT

## 2019-10-17 PROCEDURE — 6370000000 HC RX 637 (ALT 250 FOR IP): Performed by: PHYSICIAN ASSISTANT

## 2019-10-17 PROCEDURE — 93005 ELECTROCARDIOGRAM TRACING: CPT | Performed by: PHYSICIAN ASSISTANT

## 2019-10-17 PROCEDURE — 92523 SPEECH SOUND LANG COMPREHEN: CPT

## 2019-10-17 PROCEDURE — 97530 THERAPEUTIC ACTIVITIES: CPT

## 2019-10-17 RX ORDER — TELMISARTAN AND HYDROCHLORTHIAZIDE 80; 25 MG/1; MG/1
1 TABLET ORAL DAILY
Status: DISCONTINUED | OUTPATIENT
Start: 2019-10-17 | End: 2019-10-17 | Stop reason: CLARIF

## 2019-10-17 RX ORDER — ONDANSETRON 2 MG/ML
4 INJECTION INTRAMUSCULAR; INTRAVENOUS EVERY 6 HOURS PRN
Status: DISCONTINUED | OUTPATIENT
Start: 2019-10-17 | End: 2019-10-19 | Stop reason: HOSPADM

## 2019-10-17 RX ORDER — LORAZEPAM 2 MG/ML
1 INJECTION INTRAMUSCULAR
Status: COMPLETED | OUTPATIENT
Start: 2019-10-17 | End: 2019-10-17

## 2019-10-17 RX ORDER — LEVOTHYROXINE SODIUM 0.07 MG/1
150 TABLET ORAL
Status: DISCONTINUED | OUTPATIENT
Start: 2019-10-17 | End: 2019-10-19 | Stop reason: HOSPADM

## 2019-10-17 RX ORDER — DICYCLOMINE HCL 20 MG
20 TABLET ORAL 4 TIMES DAILY PRN
Status: DISCONTINUED | OUTPATIENT
Start: 2019-10-17 | End: 2019-10-19 | Stop reason: HOSPADM

## 2019-10-17 RX ORDER — LOSARTAN POTASSIUM 100 MG/1
100 TABLET ORAL DAILY
Status: DISCONTINUED | OUTPATIENT
Start: 2019-10-17 | End: 2019-10-19 | Stop reason: HOSPADM

## 2019-10-17 RX ORDER — ASPIRIN 300 MG/1
300 SUPPOSITORY RECTAL DAILY
Status: DISCONTINUED | OUTPATIENT
Start: 2019-10-17 | End: 2019-10-19 | Stop reason: HOSPADM

## 2019-10-17 RX ORDER — ASPIRIN 81 MG/1
81 TABLET ORAL DAILY
Status: DISCONTINUED | OUTPATIENT
Start: 2019-10-17 | End: 2019-10-19 | Stop reason: HOSPADM

## 2019-10-17 RX ORDER — PRAVASTATIN SODIUM 20 MG
20 TABLET ORAL NIGHTLY
Status: DISCONTINUED | OUTPATIENT
Start: 2019-10-17 | End: 2019-10-19 | Stop reason: HOSPADM

## 2019-10-17 RX ORDER — EZETIMIBE 10 MG/1
10 TABLET ORAL NIGHTLY
Status: DISCONTINUED | OUTPATIENT
Start: 2019-10-17 | End: 2019-10-17 | Stop reason: RX

## 2019-10-17 RX ORDER — SODIUM CHLORIDE 0.9 % (FLUSH) 0.9 %
10 SYRINGE (ML) INJECTION PRN
Status: DISCONTINUED | OUTPATIENT
Start: 2019-10-17 | End: 2019-10-19 | Stop reason: HOSPADM

## 2019-10-17 RX ORDER — SODIUM CHLORIDE 0.9 % (FLUSH) 0.9 %
10 SYRINGE (ML) INJECTION EVERY 12 HOURS SCHEDULED
Status: DISCONTINUED | OUTPATIENT
Start: 2019-10-17 | End: 2019-10-19 | Stop reason: HOSPADM

## 2019-10-17 RX ORDER — ACETAMINOPHEN 500 MG
1000 TABLET ORAL ONCE
Status: COMPLETED | OUTPATIENT
Start: 2019-10-17 | End: 2019-10-17

## 2019-10-17 RX ORDER — HYDROCHLOROTHIAZIDE 25 MG/1
25 TABLET ORAL DAILY
Status: DISCONTINUED | OUTPATIENT
Start: 2019-10-17 | End: 2019-10-19 | Stop reason: HOSPADM

## 2019-10-17 RX ORDER — NEBIVOLOL 5 MG/1
5 TABLET ORAL DAILY
Status: DISCONTINUED | OUTPATIENT
Start: 2019-10-17 | End: 2019-10-19 | Stop reason: HOSPADM

## 2019-10-17 RX ORDER — ALPRAZOLAM 0.5 MG/1
0.5 TABLET ORAL
Status: ACTIVE | OUTPATIENT
Start: 2019-10-17 | End: 2019-10-17

## 2019-10-17 RX ORDER — ROSUVASTATIN CALCIUM 10 MG/1
40 TABLET, COATED ORAL NIGHTLY
Status: DISCONTINUED | OUTPATIENT
Start: 2019-10-17 | End: 2019-10-17

## 2019-10-17 RX ADMIN — LORAZEPAM 1 MG: 2 INJECTION INTRAMUSCULAR; INTRAVENOUS at 11:05

## 2019-10-17 RX ADMIN — ENOXAPARIN SODIUM 40 MG: 40 INJECTION SUBCUTANEOUS at 21:19

## 2019-10-17 RX ADMIN — HYDROCHLOROTHIAZIDE 25 MG: 25 TABLET ORAL at 11:05

## 2019-10-17 RX ADMIN — MOMETASONE FUROATE AND FORMOTEROL FUMARATE DIHYDRATE 1 PUFF: 200; 5 AEROSOL RESPIRATORY (INHALATION) at 20:48

## 2019-10-17 RX ADMIN — LOSARTAN POTASSIUM 100 MG: 100 TABLET ORAL at 11:05

## 2019-10-17 RX ADMIN — PRAVASTATIN SODIUM 20 MG: 20 TABLET ORAL at 21:19

## 2019-10-17 RX ADMIN — Medication 10 ML: at 11:05

## 2019-10-17 RX ADMIN — LEVOTHYROXINE SODIUM 150 MCG: 75 TABLET ORAL at 11:05

## 2019-10-17 RX ADMIN — NEBIVOLOL HYDROCHLORIDE 5 MG: 5 TABLET ORAL at 22:05

## 2019-10-17 RX ADMIN — ASPIRIN 81 MG: 81 TABLET, COATED ORAL at 11:05

## 2019-10-17 RX ADMIN — IOPAMIDOL 75 ML: 755 INJECTION, SOLUTION INTRAVENOUS at 07:06

## 2019-10-17 RX ADMIN — MOMETASONE FUROATE AND FORMOTEROL FUMARATE DIHYDRATE 1 PUFF: 200; 5 AEROSOL RESPIRATORY (INHALATION) at 13:31

## 2019-10-17 RX ADMIN — Medication 10 ML: at 21:19

## 2019-10-17 RX ADMIN — ACETAMINOPHEN 1000 MG: 500 TABLET ORAL at 06:37

## 2019-10-17 ASSESSMENT — PAIN DESCRIPTION - ONSET: ONSET: ON-GOING

## 2019-10-17 ASSESSMENT — ENCOUNTER SYMPTOMS
VOMITING: 0
ABDOMINAL DISTENTION: 0
TROUBLE SWALLOWING: 0
SORE THROAT: 0
DIARRHEA: 0
SHORTNESS OF BREATH: 1
SHORTNESS OF BREATH: 0
CHEST TIGHTNESS: 0
EYE PAIN: 0
ABDOMINAL PAIN: 0
BACK PAIN: 0
PHOTOPHOBIA: 0
NAUSEA: 0
WHEEZING: 0
COLOR CHANGE: 0
COUGH: 0
SINUS PAIN: 0

## 2019-10-17 ASSESSMENT — PAIN SCALES - GENERAL
PAINLEVEL_OUTOF10: 0
PAINLEVEL_OUTOF10: 8
PAINLEVEL_OUTOF10: 0
PAINLEVEL_OUTOF10: 10
PAINLEVEL_OUTOF10: 2

## 2019-10-17 ASSESSMENT — PAIN DESCRIPTION - LOCATION
LOCATION: HEAD
LOCATION: HEAD

## 2019-10-17 ASSESSMENT — PAIN DESCRIPTION - PAIN TYPE
TYPE: ACUTE PAIN
TYPE: ACUTE PAIN

## 2019-10-17 ASSESSMENT — PAIN DESCRIPTION - PROGRESSION: CLINICAL_PROGRESSION: GRADUALLY IMPROVING

## 2019-10-17 ASSESSMENT — PAIN DESCRIPTION - DESCRIPTORS
DESCRIPTORS: ACHING
DESCRIPTORS: SQUEEZING;ACHING

## 2019-10-17 ASSESSMENT — PAIN DESCRIPTION - FREQUENCY
FREQUENCY: CONTINUOUS
FREQUENCY: CONTINUOUS

## 2019-10-17 ASSESSMENT — PAIN DESCRIPTION - ORIENTATION: ORIENTATION: MID

## 2019-10-17 ASSESSMENT — PAIN - FUNCTIONAL ASSESSMENT: PAIN_FUNCTIONAL_ASSESSMENT: ACTIVITIES ARE NOT PREVENTED

## 2019-10-18 LAB
ANION GAP SERPL CALCULATED.3IONS-SCNC: 14 MMOL/L (ref 3–16)
ANION GAP SERPL CALCULATED.3IONS-SCNC: 15 MMOL/L (ref 3–16)
BUN BLDV-MCNC: 17 MG/DL (ref 7–20)
BUN BLDV-MCNC: 19 MG/DL (ref 7–20)
CALCIUM SERPL-MCNC: 9.6 MG/DL (ref 8.3–10.6)
CALCIUM SERPL-MCNC: 9.6 MG/DL (ref 8.3–10.6)
CHLORIDE BLD-SCNC: 101 MMOL/L (ref 99–110)
CHLORIDE BLD-SCNC: 98 MMOL/L (ref 99–110)
CHOLESTEROL, TOTAL: 156 MG/DL (ref 0–199)
CO2: 24 MMOL/L (ref 21–32)
CO2: 24 MMOL/L (ref 21–32)
CREAT SERPL-MCNC: 0.6 MG/DL (ref 0.6–1.2)
CREAT SERPL-MCNC: 0.7 MG/DL (ref 0.6–1.2)
ESTIMATED AVERAGE GLUCOSE: 114 MG/DL
GFR AFRICAN AMERICAN: >60
GFR AFRICAN AMERICAN: >60
GFR NON-AFRICAN AMERICAN: >60
GFR NON-AFRICAN AMERICAN: >60
GLUCOSE BLD-MCNC: 116 MG/DL (ref 70–99)
GLUCOSE BLD-MCNC: 122 MG/DL (ref 70–99)
HBA1C MFR BLD: 5.6 %
HCT VFR BLD CALC: 35.4 % (ref 36–48)
HDLC SERPL-MCNC: 64 MG/DL (ref 40–60)
HEMOGLOBIN: 11.9 G/DL (ref 12–16)
LDL CHOLESTEROL CALCULATED: 60 MG/DL
MAGNESIUM: 1.6 MG/DL (ref 1.8–2.4)
MAGNESIUM: 1.9 MG/DL (ref 1.8–2.4)
MCH RBC QN AUTO: 28.1 PG (ref 26–34)
MCHC RBC AUTO-ENTMCNC: 33.4 G/DL (ref 31–36)
MCV RBC AUTO: 83.9 FL (ref 80–100)
PDW BLD-RTO: 15.2 % (ref 12.4–15.4)
PLATELET # BLD: 186 K/UL (ref 135–450)
PMV BLD AUTO: 7.2 FL (ref 5–10.5)
POTASSIUM SERPL-SCNC: 3.5 MMOL/L (ref 3.5–5.1)
POTASSIUM SERPL-SCNC: 3.9 MMOL/L (ref 3.5–5.1)
RBC # BLD: 4.22 M/UL (ref 4–5.2)
SODIUM BLD-SCNC: 136 MMOL/L (ref 136–145)
SODIUM BLD-SCNC: 140 MMOL/L (ref 136–145)
TRIGL SERPL-MCNC: 160 MG/DL (ref 0–150)
TSH REFLEX: 0.38 UIU/ML (ref 0.27–4.2)
VLDLC SERPL CALC-MCNC: 32 MG/DL
WBC # BLD: 5.7 K/UL (ref 4–11)

## 2019-10-18 PROCEDURE — 6360000002 HC RX W HCPCS: Performed by: INTERNAL MEDICINE

## 2019-10-18 PROCEDURE — 6370000000 HC RX 637 (ALT 250 FOR IP): Performed by: INTERNAL MEDICINE

## 2019-10-18 PROCEDURE — 85027 COMPLETE CBC AUTOMATED: CPT

## 2019-10-18 PROCEDURE — 97110 THERAPEUTIC EXERCISES: CPT

## 2019-10-18 PROCEDURE — 94760 N-INVAS EAR/PLS OXIMETRY 1: CPT

## 2019-10-18 PROCEDURE — 83735 ASSAY OF MAGNESIUM: CPT

## 2019-10-18 PROCEDURE — 97116 GAIT TRAINING THERAPY: CPT

## 2019-10-18 PROCEDURE — 80048 BASIC METABOLIC PNL TOTAL CA: CPT

## 2019-10-18 PROCEDURE — 80061 LIPID PANEL: CPT

## 2019-10-18 PROCEDURE — 99223 1ST HOSP IP/OBS HIGH 75: CPT | Performed by: INTERNAL MEDICINE

## 2019-10-18 PROCEDURE — 2060000000 HC ICU INTERMEDIATE R&B

## 2019-10-18 PROCEDURE — 36415 COLL VENOUS BLD VENIPUNCTURE: CPT

## 2019-10-18 PROCEDURE — 92507 TX SP LANG VOICE COMM INDIV: CPT

## 2019-10-18 PROCEDURE — 94640 AIRWAY INHALATION TREATMENT: CPT

## 2019-10-18 PROCEDURE — 6370000000 HC RX 637 (ALT 250 FOR IP): Performed by: STUDENT IN AN ORGANIZED HEALTH CARE EDUCATION/TRAINING PROGRAM

## 2019-10-18 PROCEDURE — 2580000003 HC RX 258: Performed by: INTERNAL MEDICINE

## 2019-10-18 PROCEDURE — 84443 ASSAY THYROID STIM HORMONE: CPT

## 2019-10-18 PROCEDURE — 83036 HEMOGLOBIN GLYCOSYLATED A1C: CPT

## 2019-10-18 RX ORDER — ALPRAZOLAM 0.5 MG/1
0.5 TABLET ORAL DAILY PRN
Status: DISCONTINUED | OUTPATIENT
Start: 2019-10-18 | End: 2019-10-19 | Stop reason: HOSPADM

## 2019-10-18 RX ORDER — MAGNESIUM SULFATE IN WATER 40 MG/ML
2 INJECTION, SOLUTION INTRAVENOUS ONCE
Status: COMPLETED | OUTPATIENT
Start: 2019-10-18 | End: 2019-10-18

## 2019-10-18 RX ORDER — ACETAMINOPHEN 325 MG/1
650 TABLET ORAL EVERY 4 HOURS PRN
Status: DISCONTINUED | OUTPATIENT
Start: 2019-10-18 | End: 2019-10-19 | Stop reason: HOSPADM

## 2019-10-18 RX ORDER — POTASSIUM CHLORIDE 20 MEQ/1
40 TABLET, EXTENDED RELEASE ORAL ONCE
Status: COMPLETED | OUTPATIENT
Start: 2019-10-18 | End: 2019-10-18

## 2019-10-18 RX ADMIN — ACETAMINOPHEN 650 MG: 325 TABLET ORAL at 17:24

## 2019-10-18 RX ADMIN — LOSARTAN POTASSIUM 100 MG: 100 TABLET ORAL at 09:24

## 2019-10-18 RX ADMIN — POTASSIUM CHLORIDE 40 MEQ: 20 TABLET, EXTENDED RELEASE ORAL at 12:22

## 2019-10-18 RX ADMIN — PRAVASTATIN SODIUM 20 MG: 20 TABLET ORAL at 21:19

## 2019-10-18 RX ADMIN — ASPIRIN 81 MG: 81 TABLET, COATED ORAL at 09:24

## 2019-10-18 RX ADMIN — MOMETASONE FUROATE AND FORMOTEROL FUMARATE DIHYDRATE 1 PUFF: 200; 5 AEROSOL RESPIRATORY (INHALATION) at 19:45

## 2019-10-18 RX ADMIN — ENOXAPARIN SODIUM 40 MG: 40 INJECTION SUBCUTANEOUS at 21:19

## 2019-10-18 RX ADMIN — Medication 10 ML: at 21:19

## 2019-10-18 RX ADMIN — LEVOTHYROXINE SODIUM 150 MCG: 75 TABLET ORAL at 06:27

## 2019-10-18 RX ADMIN — HYDROCHLOROTHIAZIDE 25 MG: 25 TABLET ORAL at 09:23

## 2019-10-18 RX ADMIN — NEBIVOLOL HYDROCHLORIDE 5 MG: 5 TABLET ORAL at 21:19

## 2019-10-18 RX ADMIN — MOMETASONE FUROATE AND FORMOTEROL FUMARATE DIHYDRATE 1 PUFF: 200; 5 AEROSOL RESPIRATORY (INHALATION) at 08:44

## 2019-10-18 RX ADMIN — Medication 10 ML: at 10:13

## 2019-10-18 RX ADMIN — MAGNESIUM SULFATE HEPTAHYDRATE 2 G: 40 INJECTION, SOLUTION INTRAVENOUS at 10:12

## 2019-10-18 ASSESSMENT — PAIN DESCRIPTION - FREQUENCY: FREQUENCY: CONTINUOUS

## 2019-10-18 ASSESSMENT — PAIN SCALES - GENERAL
PAINLEVEL_OUTOF10: 0
PAINLEVEL_OUTOF10: 0
PAINLEVEL_OUTOF10: 3
PAINLEVEL_OUTOF10: 1
PAINLEVEL_OUTOF10: 0

## 2019-10-18 ASSESSMENT — PAIN DESCRIPTION - PAIN TYPE: TYPE: ACUTE PAIN

## 2019-10-18 ASSESSMENT — PAIN DESCRIPTION - LOCATION: LOCATION: HEAD

## 2019-10-18 ASSESSMENT — PAIN DESCRIPTION - DESCRIPTORS: DESCRIPTORS: HEADACHE

## 2019-10-18 ASSESSMENT — PAIN DESCRIPTION - ONSET: ONSET: ON-GOING

## 2019-10-18 ASSESSMENT — PAIN - FUNCTIONAL ASSESSMENT: PAIN_FUNCTIONAL_ASSESSMENT: ACTIVITIES ARE NOT PREVENTED

## 2019-10-18 ASSESSMENT — PAIN DESCRIPTION - PROGRESSION: CLINICAL_PROGRESSION: GRADUALLY IMPROVING

## 2019-10-19 VITALS
BODY MASS INDEX: 40.49 KG/M2 | WEIGHT: 220.02 LBS | RESPIRATION RATE: 18 BRPM | TEMPERATURE: 97.5 F | SYSTOLIC BLOOD PRESSURE: 138 MMHG | OXYGEN SATURATION: 96 % | HEART RATE: 72 BPM | DIASTOLIC BLOOD PRESSURE: 77 MMHG | HEIGHT: 62 IN

## 2019-10-19 PROCEDURE — 94640 AIRWAY INHALATION TREATMENT: CPT

## 2019-10-19 PROCEDURE — 6370000000 HC RX 637 (ALT 250 FOR IP): Performed by: INTERNAL MEDICINE

## 2019-10-19 PROCEDURE — 94760 N-INVAS EAR/PLS OXIMETRY 1: CPT

## 2019-10-19 PROCEDURE — 99231 SBSQ HOSP IP/OBS SF/LOW 25: CPT | Performed by: NURSE PRACTITIONER

## 2019-10-19 PROCEDURE — 94660 CPAP INITIATION&MGMT: CPT

## 2019-10-19 PROCEDURE — 2580000003 HC RX 258: Performed by: INTERNAL MEDICINE

## 2019-10-19 RX ORDER — PRAVASTATIN SODIUM 20 MG
20 TABLET ORAL NIGHTLY
Qty: 30 TABLET | Refills: 3 | Status: SHIPPED | OUTPATIENT
Start: 2019-10-19 | End: 2020-01-13 | Stop reason: SDUPTHER

## 2019-10-19 RX ADMIN — LEVOTHYROXINE SODIUM 150 MCG: 75 TABLET ORAL at 06:01

## 2019-10-19 RX ADMIN — Medication 10 ML: at 09:04

## 2019-10-19 RX ADMIN — ASPIRIN 81 MG: 81 TABLET, COATED ORAL at 09:02

## 2019-10-19 RX ADMIN — HYDROCHLOROTHIAZIDE 25 MG: 25 TABLET ORAL at 09:02

## 2019-10-19 RX ADMIN — MOMETASONE FUROATE AND FORMOTEROL FUMARATE DIHYDRATE 1 PUFF: 200; 5 AEROSOL RESPIRATORY (INHALATION) at 08:44

## 2019-10-19 RX ADMIN — LOSARTAN POTASSIUM 100 MG: 100 TABLET ORAL at 09:02

## 2019-10-21 ENCOUNTER — OFFICE VISIT (OUTPATIENT)
Dept: CARDIOLOGY CLINIC | Age: 74
End: 2019-10-21
Payer: MEDICARE

## 2019-10-21 VITALS
DIASTOLIC BLOOD PRESSURE: 78 MMHG | BODY MASS INDEX: 39.56 KG/M2 | WEIGHT: 215 LBS | HEART RATE: 70 BPM | SYSTOLIC BLOOD PRESSURE: 120 MMHG | OXYGEN SATURATION: 98 % | HEIGHT: 62 IN

## 2019-10-21 DIAGNOSIS — G45.9 TRANSIENT CEREBRAL ISCHEMIA, UNSPECIFIED TYPE: ICD-10-CM

## 2019-10-21 DIAGNOSIS — Q21.12 PFO (PATENT FORAMEN OVALE): ICD-10-CM

## 2019-10-21 DIAGNOSIS — E78.2 MIXED HYPERLIPIDEMIA: ICD-10-CM

## 2019-10-21 DIAGNOSIS — I63.9 ACUTE ISCHEMIC STROKE (HCC): ICD-10-CM

## 2019-10-21 DIAGNOSIS — I10 ESSENTIAL HYPERTENSION, BENIGN: Primary | ICD-10-CM

## 2019-10-21 PROCEDURE — G8399 PT W/DXA RESULTS DOCUMENT: HCPCS | Performed by: INTERNAL MEDICINE

## 2019-10-21 PROCEDURE — 4040F PNEUMOC VAC/ADMIN/RCVD: CPT | Performed by: INTERNAL MEDICINE

## 2019-10-21 PROCEDURE — 1036F TOBACCO NON-USER: CPT | Performed by: INTERNAL MEDICINE

## 2019-10-21 PROCEDURE — 1111F DSCHRG MED/CURRENT MED MERGE: CPT | Performed by: INTERNAL MEDICINE

## 2019-10-21 PROCEDURE — 1123F ACP DISCUSS/DSCN MKR DOCD: CPT | Performed by: INTERNAL MEDICINE

## 2019-10-21 PROCEDURE — G8427 DOCREV CUR MEDS BY ELIG CLIN: HCPCS | Performed by: INTERNAL MEDICINE

## 2019-10-21 PROCEDURE — G8484 FLU IMMUNIZE NO ADMIN: HCPCS | Performed by: INTERNAL MEDICINE

## 2019-10-21 PROCEDURE — 99214 OFFICE O/P EST MOD 30 MIN: CPT | Performed by: INTERNAL MEDICINE

## 2019-10-21 PROCEDURE — 3017F COLORECTAL CA SCREEN DOC REV: CPT | Performed by: INTERNAL MEDICINE

## 2019-10-21 PROCEDURE — G8598 ASA/ANTIPLAT THER USED: HCPCS | Performed by: INTERNAL MEDICINE

## 2019-10-21 PROCEDURE — 1090F PRES/ABSN URINE INCON ASSESS: CPT | Performed by: INTERNAL MEDICINE

## 2019-10-21 PROCEDURE — G8417 CALC BMI ABV UP PARAM F/U: HCPCS | Performed by: INTERNAL MEDICINE

## 2019-10-22 ENCOUNTER — TELEPHONE (OUTPATIENT)
Dept: ORTHOPEDIC SURGERY | Age: 74
End: 2019-10-22

## 2019-12-12 ENCOUNTER — TELEPHONE (OUTPATIENT)
Dept: ORTHOPEDIC SURGERY | Age: 74
End: 2019-12-12

## 2019-12-16 ENCOUNTER — OFFICE VISIT (OUTPATIENT)
Dept: ORTHOPEDIC SURGERY | Age: 74
End: 2019-12-16
Payer: MEDICARE

## 2019-12-16 VITALS
TEMPERATURE: 98.5 F | DIASTOLIC BLOOD PRESSURE: 69 MMHG | HEIGHT: 62 IN | WEIGHT: 215 LBS | BODY MASS INDEX: 39.56 KG/M2 | SYSTOLIC BLOOD PRESSURE: 141 MMHG | HEART RATE: 63 BPM

## 2019-12-16 DIAGNOSIS — M17.12 PRIMARY OSTEOARTHRITIS OF LEFT KNEE: Primary | ICD-10-CM

## 2019-12-16 PROCEDURE — 1036F TOBACCO NON-USER: CPT | Performed by: PHYSICIAN ASSISTANT

## 2019-12-16 PROCEDURE — 4040F PNEUMOC VAC/ADMIN/RCVD: CPT | Performed by: PHYSICIAN ASSISTANT

## 2019-12-16 PROCEDURE — 93228 REMOTE 30 DAY ECG REV/REPORT: CPT | Performed by: INTERNAL MEDICINE

## 2019-12-16 PROCEDURE — G8417 CALC BMI ABV UP PARAM F/U: HCPCS | Performed by: PHYSICIAN ASSISTANT

## 2019-12-16 PROCEDURE — 20610 DRAIN/INJ JOINT/BURSA W/O US: CPT | Performed by: PHYSICIAN ASSISTANT

## 2019-12-16 PROCEDURE — G8598 ASA/ANTIPLAT THER USED: HCPCS | Performed by: PHYSICIAN ASSISTANT

## 2019-12-16 PROCEDURE — G8484 FLU IMMUNIZE NO ADMIN: HCPCS | Performed by: PHYSICIAN ASSISTANT

## 2019-12-16 PROCEDURE — 3017F COLORECTAL CA SCREEN DOC REV: CPT | Performed by: PHYSICIAN ASSISTANT

## 2019-12-16 PROCEDURE — 1123F ACP DISCUSS/DSCN MKR DOCD: CPT | Performed by: PHYSICIAN ASSISTANT

## 2019-12-16 PROCEDURE — G8399 PT W/DXA RESULTS DOCUMENT: HCPCS | Performed by: PHYSICIAN ASSISTANT

## 2019-12-16 PROCEDURE — 99213 OFFICE O/P EST LOW 20 MIN: CPT | Performed by: PHYSICIAN ASSISTANT

## 2019-12-16 PROCEDURE — G8427 DOCREV CUR MEDS BY ELIG CLIN: HCPCS | Performed by: PHYSICIAN ASSISTANT

## 2019-12-16 PROCEDURE — 1090F PRES/ABSN URINE INCON ASSESS: CPT | Performed by: PHYSICIAN ASSISTANT

## 2019-12-24 DIAGNOSIS — Q21.12 PFO (PATENT FORAMEN OVALE): ICD-10-CM

## 2019-12-24 DIAGNOSIS — I63.9 ACUTE ISCHEMIC STROKE (HCC): ICD-10-CM

## 2019-12-24 DIAGNOSIS — G45.9 TRANSIENT CEREBRAL ISCHEMIA, UNSPECIFIED TYPE: ICD-10-CM

## 2020-01-03 ENCOUNTER — INITIAL CONSULT (OUTPATIENT)
Dept: SURGERY | Age: 75
End: 2020-01-03
Payer: MEDICARE

## 2020-01-03 VITALS
DIASTOLIC BLOOD PRESSURE: 75 MMHG | BODY MASS INDEX: 39.78 KG/M2 | WEIGHT: 214 LBS | HEART RATE: 58 BPM | SYSTOLIC BLOOD PRESSURE: 151 MMHG

## 2020-01-03 PROCEDURE — 1090F PRES/ABSN URINE INCON ASSESS: CPT | Performed by: SURGERY

## 2020-01-03 PROCEDURE — 1123F ACP DISCUSS/DSCN MKR DOCD: CPT | Performed by: SURGERY

## 2020-01-03 PROCEDURE — 3017F COLORECTAL CA SCREEN DOC REV: CPT | Performed by: SURGERY

## 2020-01-03 PROCEDURE — G8417 CALC BMI ABV UP PARAM F/U: HCPCS | Performed by: SURGERY

## 2020-01-03 PROCEDURE — G8484 FLU IMMUNIZE NO ADMIN: HCPCS | Performed by: SURGERY

## 2020-01-03 PROCEDURE — 1036F TOBACCO NON-USER: CPT | Performed by: SURGERY

## 2020-01-03 PROCEDURE — 99204 OFFICE O/P NEW MOD 45 MIN: CPT | Performed by: SURGERY

## 2020-01-03 PROCEDURE — 4040F PNEUMOC VAC/ADMIN/RCVD: CPT | Performed by: SURGERY

## 2020-01-03 PROCEDURE — G8399 PT W/DXA RESULTS DOCUMENT: HCPCS | Performed by: SURGERY

## 2020-01-03 PROCEDURE — G8427 DOCREV CUR MEDS BY ELIG CLIN: HCPCS | Performed by: SURGERY

## 2020-01-03 ASSESSMENT — ENCOUNTER SYMPTOMS
EYES NEGATIVE: 1
ALLERGIC/IMMUNOLOGIC NEGATIVE: 1
GASTROINTESTINAL NEGATIVE: 1
RESPIRATORY NEGATIVE: 1

## 2020-01-03 NOTE — PROGRESS NOTES
Daily Progress Note   John Masterson MD      1/3/2020    Chief Complaint   Patient presents with    New Patient     Referred by Dr. Evelia Villar for Lymphedema treatment. Patient does wear compression stockings daily (30-40mmHg). Patient had a compression pump, but it broke in July. She has been unable to replace it due to Dr. Mary Noguera move. HISTORY OF PRESENT ILLNESS:                The patient is a 75 y.o. female who presents with a referral from Dr. Chandni Zazueta for lymphedema. She is a previous Dr. Flor Peterson patient, and says she had a lymphapress for a long time-thirteen years, but it has broken. Dr. Flor Peterson wanted her to check with her cardiologist, Dr. Edmond Arreaga. She has had cellulitis previously and has some hemosiderin of her left leg. She says she was told she has a hole in her heart, and will talk with Dr. Edmond Arreaga at her visit with him. Mrs. Amilcar Ayala would like to have a new lymphapress, but there are no notes from Dr. Flor Peterson. She was using the lymphapress machine for up to 6 hours a day. Past Medical History:   Diagnosis Date    Asthma     Diverticulitis     Graves disease     Hyperlipidemia     Hypertension     Hyperthyroidism     Hypothyroidism     Mild persistent asthma without complication     Osteoarthritis     Peptic ulcer, unspecified site, unspecified as acute or chronic, without mention of hemorrhage or perforation     Prolonged emergence from general anesthesia     Sleep apnea     CPAP at night    Venous insufficiency     Wears glasses        Past Surgical History:   Procedure Laterality Date    BLEPHAROPLASTY Left     CHOLECYSTECTOMY  2001    COLONOSCOPY  2011    normal-daniel    FOOT SURGERY Left     spurs removed from left heel    HYSTERECTOMY      KNEE ARTHROSCOPY Left     ROTATOR CUFF REPAIR Right        Social History     Socioeconomic History    Marital status:       Spouse name: Not on file    Number of children: 3    Years of education: Not on file    Highest education level: Not on file   Occupational History    Not on file   Social Needs    Financial resource strain: Not on file    Food insecurity:     Worry: Not on file     Inability: Not on file    Transportation needs:     Medical: Not on file     Non-medical: Not on file   Tobacco Use    Smoking status: Former Smoker     Packs/day: 0.25     Years: 3.00     Pack years: 0.75     Types: Cigarettes     Start date: 10/21/1961     Last attempt to quit: 10/21/1964     Years since quittin.2    Smokeless tobacco: Never Used   Substance and Sexual Activity    Alcohol use: Yes     Comment: SOCIALLY    Drug use: No    Sexual activity: Never   Lifestyle    Physical activity:     Days per week: Not on file     Minutes per session: Not on file    Stress: Not on file   Relationships    Social connections:     Talks on phone: Not on file     Gets together: Not on file     Attends Anglican service: Not on file     Active member of club or organization: Not on file     Attends meetings of clubs or organizations: Not on file     Relationship status: Not on file    Intimate partner violence:     Fear of current or ex partner: Not on file     Emotionally abused: Not on file     Physically abused: Not on file     Forced sexual activity: Not on file   Other Topics Concern    Not on file   Social History Narrative    Not on file       Family History   Problem Relation Age of Onset    Hypertension Mother     Colon Cancer Father     Diabetes Sister          Current Outpatient Medications:     nebivolol (BYSTOLIC) 10 MG tablet, Take 1 tablet by mouth daily, Disp: 30 tablet, Rfl: 5    ALPRAZolam (XANAX) 0.5 MG tablet, Take 1 tablet by mouth nightly as needed for Sleep for up to 30 days. , Disp: 30 tablet, Rfl: 0    nebivolol (BYSTOLIC) 10 MG tablet, Take 1 tablet by mouth daily Lot # [id number]  Qty: 4, Disp: 4 tablet, Rfl: 0    pravastatin (PRAVACHOL) 20 MG tablet, Take 1 tablet by mouth nightly, Disp: 30 tablet, Rfl: 3    MAGNESIUM PO, Take by mouth, Disp: , Rfl:     levothyroxine (SYNTHROID) 150 MCG tablet, TAKE 1 TABLET BY MOUTH DAILY, Disp: 90 tablet, Rfl: 3    mometasone-formoterol (DULERA) 200-5 MCG/ACT inhaler, INHALE 2 PUFFS INTO THE LUNGS TWICE DAILY, Disp: 1 Inhaler, Rfl: 3    PROAIR  (90 Base) MCG/ACT inhaler, USE 2 INHALATIONS ORALLY   INTO THE LUNGS EVERY 6     HOURS AS NEEDEDFOR        WHEEZING, Disp: 8.5 g, Rfl: 4    ezetimibe (ZETIA) 10 MG tablet, TAKE 1 TABLET DAILY, Disp: 90 tablet, Rfl: 3    telmisartan-hydrochlorothiazide (MICARDIS HCT) 80-25 MG per tablet, TAKE 1 TABLET BY MOUTH DAILY, Disp: 90 tablet, Rfl: 3    cyanocobalamin ([employer] VITAMIN B12) 1000 MCG tablet, Take 1 tablet by mouth daily, Disp: 30 tablet, Rfl: 3    aspirin 81 MG tablet, Take 81 mg by mouth daily. , Disp: , Rfl:     Montelukast sodium and Sulfa antibiotics    Vitals:    01/03/20 1037   BP: (!) 151/75   Site: Right Upper Arm   Pulse: 58   Weight: 214 lb (97.1 kg)       Office Visit on 12/13/2019   Component Date Value Ref Range Status    Sodium 12/13/2019 142  136 - 145 mmol/L Final    Potassium 12/13/2019 4.0  3.5 - 5.1 mmol/L Final    Chloride 12/13/2019 103  99 - 110 mmol/L Final    CO2 12/13/2019 26  21 - 32 mmol/L Final    Anion Gap 12/13/2019 13  3 - 16 Final    Glucose 12/13/2019 99  70 - 99 mg/dL Final    BUN 12/13/2019 18  7 - 20 mg/dL Final    CREATININE 12/13/2019 0.6  0.6 - 1.2 mg/dL Final    GFR Non- 12/13/2019 >60  >60 Final    Comment: >60 mL/min/1.73m2 EGFR, calc. for ages 25 and older using the  MDRD formula (not corrected for weight), is valid for stable  renal function.  GFR  12/13/2019 >60  >60 Final    Comment: Chronic Kidney Disease: less than 60 ml/min/1.73 sq.m. Kidney Failure: less than 15 ml/min/1.73 sq.m. Results valid for patients 18 years and older.       Calcium 12/13/2019 9.7  8.3 - 10.6 mg/dL Final    Total Protein 12/13/2019 6.4  6.4 - 8.2 g/dL Final    Alb 12/13/2019 4.1  3.4 - 5.0 g/dL Final    Albumin/Globulin Ratio 12/13/2019 1.8  1.1 - 2.2 Final    Total Bilirubin 12/13/2019 0.3  0.0 - 1.0 mg/dL Final    Alkaline Phosphatase 12/13/2019 118  40 - 129 U/L Final    ALT 12/13/2019 14  10 - 40 U/L Final    AST 12/13/2019 13* 15 - 37 U/L Final    Globulin 12/13/2019 2.3  g/dL Final    Cholesterol, Total 12/13/2019 135  0 - 199 mg/dL Final    Triglycerides 12/13/2019 112  0 - 150 mg/dL Final    HDL 12/13/2019 67* 40 - 60 mg/dL Final    LDL Calculated 12/13/2019 46  <100 mg/dL Final    VLDL Cholesterol Calculated 12/13/2019 22  Not Established mg/dL Final       Review of Systems   Constitutional: Negative. HENT: Negative. Eyes: Negative. Respiratory: Negative. Cardiovascular: Positive for leg swelling. Negative for chest pain and palpitations. Gastrointestinal: Negative. Endocrine: Negative. Genitourinary: Negative. Musculoskeletal: Negative. Skin: Negative. Allergic/Immunologic: Negative. Neurological: Negative. Hematological: Negative. Psychiatric/Behavioral: Negative. Physical Exam  Vitals signs reviewed. Constitutional:       General: She is not in acute distress. Appearance: Normal appearance. She is well-developed. She is obese. She is not ill-appearing or toxic-appearing. HENT:      Head: Normocephalic and atraumatic. Right Ear: External ear normal.      Left Ear: External ear normal.   Eyes:      General: No scleral icterus. Pupils: Pupils are equal, round, and reactive to light. Neck:      Musculoskeletal: Normal range of motion and neck supple. No edema or erythema. Thyroid: No thyromegaly. Vascular: Normal carotid pulses. No carotid bruit or JVD. Trachea: No tracheal deviation. Cardiovascular:      Rate and Rhythm: Normal rate and regular rhythm. Pulses:           Femoral pulses are 2+ on the right side and 2+ on the left side.        Dorsalis pedis pulses are 1+ on the right side and 2+ on the left side. Posterior tibial pulses are 2+ on the right side and 2+ on the left side. Heart sounds: Normal heart sounds and S1 normal. No murmur. Comments: MEASUREMENTS 1/3/2020:    RIGHT ANKLE: 22.8 cm   RIGHT CALF: 46.4 cm     LEFT ANKLE: 23.3 cm   LEFT CALF: 47.7 cm     No abdominal bruits    Pulmonary:      Effort: Pulmonary effort is normal. No tachypnea, accessory muscle usage or respiratory distress. Breath sounds: Normal breath sounds. No stridor. No wheezing or rales. Abdominal:      General: Bowel sounds are normal. There is no distension. Palpations: Abdomen is soft. There is no mass. Tenderness: There is no tenderness. There is no guarding or rebound. Hernia: No hernia is present. There is no hernia in the ventral area, right inguinal area or left inguinal area. Genitourinary:     Comments: Rectal exam/stool guaiac not indicated. Musculoskeletal:         General: No tenderness. Right shoulder: She exhibits normal range of motion, no deformity and no pain. Right lower le+ Pitting Edema present. Left lower le+ Pitting Edema present. Lymphadenopathy:      Head:      Right side of head: No submandibular, preauricular, posterior auricular or occipital adenopathy. Left side of head: No submandibular, preauricular, posterior auricular or occipital adenopathy. Cervical: No cervical adenopathy. Right cervical: No superficial, deep or posterior cervical adenopathy. Left cervical: No superficial, deep or posterior cervical adenopathy. Upper Body:      Right upper body: No supraclavicular or pectoral adenopathy. Left upper body: No supraclavicular or pectoral adenopathy. Skin:     General: Skin is warm and dry. Coloration: Skin is not pale. Findings: No bruising, erythema, laceration, lesion or rash.    Neurological:      Mental Status: She is oriented to person, place, and time. Cranial Nerves: No cranial nerve deficit. Sensory: No sensory deficit. Motor: No atrophy or abnormal muscle tone. Coordination: Coordination normal.      Gait: Gait normal.   Psychiatric:         Speech: Speech normal.         Behavior: Behavior normal.         Thought Content: Thought content normal.         Judgment: Judgment normal.       Ms. Hetal Barragan has Graves Disease. She has also had her gallbladder removed as well as a hysterectomy. She appears to have a diastasis. She is Aminata Sultana's sister-in-law  ASSESSMENT:    Problem List Items Addressed This Visit     None      Visit Diagnoses     Lymphedema    -  Primary          PLAN:  She was very good at wearing her Lymphapress she actually made it last 13 years then it broke. We will not be able to get a lot of the records because they are in Dr. Franko Tomlin storage area. Dr. Luis Parsons told her she had a get clearance from cardiac before she would order it but then will left town. Is I read Dr. Patricio Figueroa note I do not see anything about congestive heart failure  We will attempt to get a new lymphapress for Ms. Sultana. She should continue to wear her compression hose. Return in six months for a leg and stocking check.        Edith Mclaughlin MA am scribing for and in the presence of Rubi Lyn MD on this date of 01/03/20      Electronically signed by Rubi Lyn MD on 1/3/2020 at 10:59 AM

## 2020-01-07 ENCOUNTER — OFFICE VISIT (OUTPATIENT)
Dept: ORTHOPEDIC SURGERY | Age: 75
End: 2020-01-07
Payer: MEDICARE

## 2020-01-07 VITALS
HEART RATE: 68 BPM | BODY MASS INDEX: 39.38 KG/M2 | SYSTOLIC BLOOD PRESSURE: 136 MMHG | HEIGHT: 62 IN | WEIGHT: 214 LBS | TEMPERATURE: 98.6 F | DIASTOLIC BLOOD PRESSURE: 68 MMHG

## 2020-01-07 PROCEDURE — 20610 DRAIN/INJ JOINT/BURSA W/O US: CPT | Performed by: PHYSICIAN ASSISTANT

## 2020-01-08 NOTE — PROGRESS NOTES
Subjective:    She  is here for initial Euflexxa injection for the  left knee. Objective:   Blood pressure 136/68, pulse 68, temperature 98.6 °F (37 °C), temperature source Temporal, height 5' 1.5\" (1.562 m), weight 214 lb (97.1 kg), not currently breastfeeding. There is a mild joint effusion noted of the left knee. There is mild pain with range of motion testing. There is no significant  instability noted. Assessment:    ICD-10-CM    1. Primary osteoarthritis of left knee M17.12 PA ARTHROCENTESIS ASPIR&/INJ MAJOR JT/BURSA W/O US     EUFLEXXA INJ PER DOSE       Plan:  Proceed with initial injection in the series of 3 injections. PROCEDURE NOTE:  PRE-PROCEDURE DIAGNOSIS: DJD knee  POST-PROCEDURE DIAGNOSIS: DJD knee  With the patient's permission, her left knee was prepped in standard sterile fashion with  Alcohol. The prefilled injection of the preferred visco supplementation ( Euflexxa 20 mg/ 2 ML ) was injected into the  lateral joint space compartment without difficulty. The patient tolerated the procedure(s) well without difficulty. A band-aid was applied. POST-PROCEDURE INSTRUCTIONS GIVEN TO PATIENT:   She was advised to ice the knee for 15-20 minutes to relieve any injection site related pain. Decrease activity for the next 24 to 48 hours. May use prescription or OTC pain relievers as needed    FOLLOW-UP:   As directed or call or return to clinic if these symptoms worsen or fail to improve as anticipated. If at any time you are concerned you may contact the office for further instructions or care.

## 2020-01-13 ENCOUNTER — OFFICE VISIT (OUTPATIENT)
Dept: CARDIOLOGY CLINIC | Age: 75
End: 2020-01-13
Payer: MEDICARE

## 2020-01-13 VITALS
HEART RATE: 63 BPM | OXYGEN SATURATION: 97 % | HEIGHT: 62 IN | WEIGHT: 215 LBS | DIASTOLIC BLOOD PRESSURE: 60 MMHG | SYSTOLIC BLOOD PRESSURE: 122 MMHG | BODY MASS INDEX: 39.56 KG/M2

## 2020-01-13 PROCEDURE — 1123F ACP DISCUSS/DSCN MKR DOCD: CPT | Performed by: INTERNAL MEDICINE

## 2020-01-13 PROCEDURE — G8428 CUR MEDS NOT DOCUMENT: HCPCS | Performed by: INTERNAL MEDICINE

## 2020-01-13 PROCEDURE — G8417 CALC BMI ABV UP PARAM F/U: HCPCS | Performed by: INTERNAL MEDICINE

## 2020-01-13 PROCEDURE — G8399 PT W/DXA RESULTS DOCUMENT: HCPCS | Performed by: INTERNAL MEDICINE

## 2020-01-13 PROCEDURE — 1036F TOBACCO NON-USER: CPT | Performed by: INTERNAL MEDICINE

## 2020-01-13 PROCEDURE — 99214 OFFICE O/P EST MOD 30 MIN: CPT | Performed by: INTERNAL MEDICINE

## 2020-01-13 PROCEDURE — 4040F PNEUMOC VAC/ADMIN/RCVD: CPT | Performed by: INTERNAL MEDICINE

## 2020-01-13 PROCEDURE — 3017F COLORECTAL CA SCREEN DOC REV: CPT | Performed by: INTERNAL MEDICINE

## 2020-01-13 PROCEDURE — G8484 FLU IMMUNIZE NO ADMIN: HCPCS | Performed by: INTERNAL MEDICINE

## 2020-01-13 PROCEDURE — 1090F PRES/ABSN URINE INCON ASSESS: CPT | Performed by: INTERNAL MEDICINE

## 2020-01-13 RX ORDER — PRAVASTATIN SODIUM 20 MG
20 TABLET ORAL NIGHTLY
Qty: 90 TABLET | Refills: 3 | Status: SHIPPED | OUTPATIENT
Start: 2020-01-13 | End: 2021-02-08 | Stop reason: SDUPTHER

## 2020-01-13 NOTE — PATIENT INSTRUCTIONS
Patient Education        Transient Ischemic Attack: Care Instructions  Your Care Instructions    A transient ischemic attack (TIA) is when blood flow to a part of your brain is blocked for a short time. A TIA is like a stroke but usually lasts only a few minutes. A TIA does not cause lasting brain damage. Any vision problems, slurred speech, or other symptoms usually go away in 10 to 20 minutes. But they may last for up to 24 hours. TIAs are often warning signs of a stroke. Some people who have a TIA may have a stroke in the future. A stroke can cause symptoms like those of a TIA. But a stroke causes lasting damage to your brain. You can take steps to help prevent a stroke. One thing you can do is get early treatment. If you have other new symptoms, or if your symptoms do not get better, go back to the emergency room or call your doctor right away. Getting treatment right away may prevent long-term brain damage caused by a stroke. The doctor has checked you carefully, but problems can develop later. If you notice any problems or new symptoms, get medical treatment right away. Follow-up care is a key part of your treatment and safety. Be sure to make and go to all appointments, and call your doctor if you are having problems. It's also a good idea to know your test results and keep a list of the medicines you take. How can you care for yourself at home? Medicines    · Be safe with medicines. Take your medicines exactly as prescribed. Call your doctor if you think you are having a problem with your medicine.     · If you take a blood thinner, such as aspirin, be sure you get instructions about how to take your medicine safely.  Blood thinners can cause serious bleeding problems.     · Call your doctor if you are not able to take your medicines for any reason.     · Do not take any over-the-counter medicines or herbal products without talking to your doctor first.     · If you take birth control pills or hormone falls. Make sure that the lighting is good. Put grab bars and seats in tubs and showers.     · Find out what your loved one can do and what he or she needs help with. Try not to do things for your loved one that your loved one can do on his or her own. Help him or her learn and practice new skills.     · Visit and talk with your loved one often. Try doing activities together that you both enjoy, such as playing cards or board games. Keep in touch with your loved one's friends as much as you can. Encourage them to visit.     · Take care of yourself. Do not try to do everything yourself. Ask other family members to help. Eat well, get enough rest, and take time to do things that you enjoy. Keep up with your own doctor visits, and make sure to take your medicines regularly. Get out of the house as much as you can. Join a local support group. Find out if you qualify for home health care visits to help with rehab or for adult day care. When should you call for help? Call 911 anytime you think you may need emergency care. For example, call if:    · You have signs of another stroke. These may include:  ? Sudden numbness, tingling, weakness, or loss of movement in your face, arm, or leg, especially on only one side of your body. ? Sudden vision changes. ? Sudden trouble speaking. ? Sudden confusion or trouble understanding simple statements. ? Sudden problems with walking or balance. ? A sudden, severe headache that is different from past headaches. Call  911 even if these symptoms go away in a few minutes.    Call your doctor now or seek immediate medical care if:    · You have new symptoms that may be related to your stroke, such as falls or trouble swallowing.    Watch closely for changes in your health, and be sure to contact your doctor if you have any problems. Where can you learn more? Go to https://lety.[employer]. org and sign in to your Attene[employer] account.  Enter K183 in the [address] along with the risks, benefits, and other options. · Tell your doctors ALL the medicines, vitamins, supplements, and herbal remedies you take. Some of these can increase the risk of bleeding or interact with anesthesia.     · If you take aspirin or some other blood thinner, ask your doctor if you should stop taking it before your procedure. Make sure that you understand exactly what your doctor wants you to do. These medicines increase the risk of bleeding.     · Your doctor will tell you which medicines to take or stop before your procedure. You may need to stop taking certain medicines a week or more before the procedure. So talk to your doctor as soon as you can.     · If you have an advance directive, let your doctor know. It may include a living will and a durable power of  for health care. Bring a copy to the hospital. If you don't have one, you may want to prepare one. It lets your doctor and loved ones know your health care wishes. Doctors advise that everyone prepare these papers before any type of surgery or procedure.     · Be sure to tell your doctor about any problems you have with your stomach or esophagus. Procedures can be stressful. This information will help you understand what you can expect. And it will help you safely prepare for your procedure. What happens on the day of the procedure? · Follow the instructions exactly about when to stop eating and drinking. If you don't, your procedure may be canceled. If your doctor told you to take your medicines on the day of the procedure, take them with only a sip of water.     · Take a bath or shower before you come in for your procedure. Do not apply lotions, perfumes, deodorants, or nail polish.     · Take off all jewelry and piercings. And take out contact lenses, if you wear them.    At the hospital or surgery center   · Bring a picture ID.     · You will be kept comfortable and safe by your anesthesia provider.  You may get medicine that relaxes you or puts you in a light sleep. The area being worked on will be numb.     · You will get some medicine sprayed in your throat. This will numb your throat to prevent you from gagging when the transducer is moved into your esophagus.     · You will lie on your left side on an exam table. You may get a mouth guard to protect your teeth.     · The procedure will take about 2 hours. During that time, the tube is in your throat for 10 to 20 minutes. Going home   · Be sure you have someone to drive you home. Anesthesia and pain medicine make it unsafe for you to drive.     · You will be given more specific instructions about recovering from your procedure. They will cover things like diet, wound care, follow-up care, driving, and getting back to your normal routine. When should you call your doctor? · You have questions or concerns.     · You don't understand how to prepare for your procedure.     · You become ill before the procedure (such as fever, flu, or a cold).     · You need to reschedule or have changed your mind about having the procedure. Where can you learn more? Go to https://[employer]. org and sign in to your [employer]Kave account. Enter K500 in the [employer] box to learn more about \"Transesophageal Echocardiogram: Before Your Procedure. \"     If you do not have an account, please click on the \"Sign Up Now\" link. Current as of: April 9, 2019  Content Version: 12.3  © 4076-4334 [employer], [employer]. Care instructions adapted under license by [facility] [place] ([facility]). If you have questions about a medical condition or this instruction, always ask your healthcare professional. [address] any warranty or liability for your use of this information.

## 2020-01-13 NOTE — PROGRESS NOTES
[facility]  Cardiology Progress Note    Eleuterio Nix  1945    January 13, 2020      Reason for Referral: acute ischemic stroke, PFO per TTE with bubble study     CC: \"No more symptoms of stroke or mini stroke. \"       Subjective:     History of Present Illness:    Eleuterio Nix is a 75 y.o. patient with a PMH significant for former smoker, Graves Disease, HTN, HLD, and who is seeing us today for management of recent acute ischemic stroke, PFO revealed per TTE with bubble study. Neurology followed during admission and we discussed possible etiology. No physical limitations. Use of cane. She has hx of panic attack resolved by xanax. States she has been under a lot of family stress. He daughter lives with her. 30 day Event monitor 11/2019 SR with 1 episodes of 20 beat atrial run. Today, she presents to discuss event monitor findings and plan moving forward. She is accompanied by 2 daughters. She denies any recurrent s/s. Compliant with medical therapy and tolerating. No abnormal bruising or bleeding. Patient denies any symptoms of chest pain, pressure, tightness, shortness of breath at rest, CLARKE, nausea, vomiting, near syncope, syncope, heart racing, palpitations, dizziness, lightheaded, PND, orthopnea, wheezing, diaphoresis, BLE edema, bilateral lower extremities pain, cramping or fatigue. Past Medical History:   has a past medical history of Asthma, Diverticulitis, Graves disease, Hyperlipidemia, Hypertension, Hyperthyroidism, Hypothyroidism, Mild persistent asthma without complication, Osteoarthritis, Peptic ulcer, unspecified site, unspecified as acute or chronic, without mention of hemorrhage or perforation, Prolonged emergence from general anesthesia, Sleep apnea, Venous insufficiency, and Wears glasses. Surgical History:   has a past surgical history that includes Hysterectomy; Cholecystectomy (2001); Colonoscopy (2011); Rotator cuff repair (Right);  Foot surgery (Left); Knee arthroscopy (Left); and blepharoplasty (Left). Social History:   reports that she quit smoking about 55 years ago. Her smoking use included cigarettes. She started smoking about 58 years ago. She has a 0.75 pack-year smoking history. She has never used smokeless tobacco. She reports current alcohol use. She reports that she does not use drugs. Family History:  family history includes Colon Cancer in her father; Diabetes in her sister; Hypertension in her mother. Home Medications:  Were reviewed and are listed in nursing record and/or below  Prior to Admission medications    Medication Sig Start Date End Date Taking? Authorizing Provider   nebivolol (BYSTOLIC) 10 MG tablet Take 1 tablet by mouth daily 12/27/19   Kathlynn Leventhal, MD   nebivolol (BYSTOLIC) 10 MG tablet Take 1 tablet by mouth daily Lot # [id number]  Qty: 4 12/13/19   Kathlynn Leventhal, MD   pravastatin (PRAVACHOL) 20 MG tablet Take 1 tablet by mouth nightly 10/19/19   Sobeida Shirley MD   MAGNESIUM PO Take by mouth    Historical Provider, MD   levothyroxine (SYNTHROID) 150 MCG tablet TAKE 1 TABLET BY MOUTH DAILY 10/1/19   Kathlynn Leventhal, MD   mometasone-formoterol [facility]) 200-5 MCG/ACT inhaler INHALE 2 PUFFS INTO THE LUNGS TWICE DAILY 9/24/19   Beverly Hernadez MD   PROAIR  (81 Base) MCG/ACT inhaler USE 2 INHALATIONS ORALLY   INTO THE LUNGS EVERY 6     HOURS AS NEEDEDFOR        WHEEZING 7/15/19   Kathlynn Leventhal, MD   ezetimibe (ZETIA) 10 MG tablet TAKE 1 TABLET DAILY 5/23/19   Kathlynn Leventhal, MD   telmisartan-hydrochlorothiazide (MICARDIS HCT) 80-25 MG per tablet TAKE 1 TABLET BY MOUTH DAILY 12/4/18   Kathlynn Leventhal, MD   cyanocobalamin ([employer] VITAMIN B12) 1000 MCG tablet Take 1 tablet by mouth daily 4/15/16   Kathlynn Leventhal, MD   aspirin 81 MG tablet Take 81 mg by mouth daily.     Historical Provider, MD        Allergies:  Montelukast sodium and Sulfa antibiotics       Review of Systems: all reviewed and Effort: Normal   Auscultation: Clear to auscultation bilaterally, respirations unlabored. No wheeze, rales   Chest Wall:  No tenderness or deformity. Cardiovascular:    Pulses  Palpation: normal   Ascultation: Regular rate, S1/ S2 normal. No murmur, rub, or gallop. 2+ radial and pedal pulses, symmetric  Carotid  Femoral   Abdomen and Gastrointestinal:   Soft, non-tender, bowel sounds active. Liver and Spleen  Masses   Musculoskeletal: No muscle wasting  Back  Gait   Extremities: Extremities normal, atraumatic. No cyanosis or edema. No cyanosis clubbing       Skin: Inspection and palpation performed, no rashes or lesions. Pysch: Normal mood and affect.  Alert and oriented to time place person   Neurologic: Normal gross motor and sensory exam.       Labs     All labs have been reviewed    Lab Results   Component Value Date    WBC 5.7 10/18/2019    RBC 4.22 10/18/2019    HGB 11.9 10/18/2019    HCT 35.4 10/18/2019    MCV 83.9 10/18/2019    RDW 15.2 10/18/2019     10/18/2019     Lab Results   Component Value Date     12/13/2019    K 4.0 12/13/2019    K 4.5 04/22/2018     12/13/2019    CO2 26 12/13/2019    BUN 18 12/13/2019    CREATININE 0.6 12/13/2019    GFRAA >60 12/13/2019    GFRAA >60 02/28/2013    AGRATIO 1.8 12/13/2019    LABGLOM >60 12/13/2019    LABGLOM 62.5 07/05/2011    GLUCOSE 99 12/13/2019    GLUCOSE 98 07/05/2011    PROT 6.4 12/13/2019    PROT 6.7 02/28/2013    CALCIUM 9.7 12/13/2019    BILITOT 0.3 12/13/2019    ALKPHOS 118 12/13/2019    AST 13 12/13/2019    ALT 14 12/13/2019     No results found for: PTINR  Lab Results   Component Value Date    LABA1C 5.6 10/18/2019     Lab Results   Component Value Date    TROPONINI <0.01 05/01/2018       Cardiac, Vascular and Imaging Data all Personally Reviewed in Detail by Myself      Echocardiogram: 10/17/19  Summary   Suboptimal image quality.   Normal left ventricle size, wall thickness, and systolic function with an   estimated ejection fraction of 55-60%. No regional wall motion abnormalities are seen.   Normal right ventricular size and function.   The left atrium is mildly dilated.   Mild aortic and tricuspid regurgitation.   Trivial mitral regurgitation.   A bubble study was performed and shows evidence of right to left shunting   consistent with a patent foramen ovale    Stress Test: none     Cath: none     Other imaging:     MRI brain: 10/17/19  Punctate acute infarct in the hiram.  No intracranial hemorrhage or mass   effect. CT head wo contrast:10/17/19  No acute intracranial abnormality    CTA head and neck:10/17/19  Unremarkable CTA of the head and neck    Assessment and Plan     -Acute Ischemic stroke acute infarcts noted in the right posterior hiram region  --Right hemiparesis which has improved. Right blurry vision of the right eye  --Neurology consultation during admission-regarding PFO, the stroke itself is not overly convincing for embolic phenomena. 30 day Event monitor 11/2019 SR with 1 episode of 20 beat atrial run   We discussed regarding merits of PFO as being because of her stroke with medical management, ILR or proceeding with JUSTINE  We again discussed continued aggressive risk factor modification aspirin, statin. We again discussed BP management, controlling cholesterol, medical management. She denies any further/recurrent symptoms since our last visit. On Aspirin 81 mg daily   The patient will discuss and call us with a decision and call us.     -Patent foramen ovale   Echo 10/17/19  Not very clear whether this is the etiology of her stroke and again discussed possible etiology.     -Hypertension, essential   Controlled today  On Bystolic     -Hyperlipemia, mixed   On pravastatin and zetia    -Graves Disease    -Hx of panic attack resolved by xanax. Would like to have knee replacement in the near futisure. She would be cleared from a cardiac perspective.      Will plan to return in follow up     Thank you for allowing us to participate in the care of Braulio Libman. Please do not hesitate to contact me if you have any questions. Kirill Cantu MD, MPH    [facility], [address] [place] [address]  Ph: [phone]  Fax: [phone]    1/13/2020 10:14 AM      This note was scribed in the presence of Dr. Catina Mathews MD by Evans Stubbs RN. Physician Attestation:  The scribes documentation has been prepared under my direction and personally reviewed by me in its entirety. I confirm that the note above accurately reflects all work, treatment, procedures, and medical decision making performed by me.

## 2020-01-14 ENCOUNTER — OFFICE VISIT (OUTPATIENT)
Dept: ORTHOPEDIC SURGERY | Age: 75
End: 2020-01-14
Payer: MEDICARE

## 2020-01-14 VITALS — TEMPERATURE: 98.4 F

## 2020-01-14 PROCEDURE — 20610 DRAIN/INJ JOINT/BURSA W/O US: CPT | Performed by: PHYSICIAN ASSISTANT

## 2020-01-21 ENCOUNTER — OFFICE VISIT (OUTPATIENT)
Dept: ORTHOPEDIC SURGERY | Age: 75
End: 2020-01-21
Payer: MEDICARE

## 2020-01-21 VITALS — TEMPERATURE: 98.1 F

## 2020-01-21 PROCEDURE — 20610 DRAIN/INJ JOINT/BURSA W/O US: CPT | Performed by: PHYSICIAN ASSISTANT

## 2020-01-21 NOTE — PROGRESS NOTES
Subjective:    She  is here for repeat Euflexxa injection #3 for the  left knee. Objective:   Temperature 98.1 °F (36.7 °C), temperature source Temporal, not currently breastfeeding. There is a mild joint effusion noted of the left knee. There is mild pain with range of motion testing. There is no significant  instability noted. Assessment:    ICD-10-CM    1. Primary osteoarthritis of left knee M17.12 CA ARTHROCENTESIS ASPIR&/INJ MAJOR JT/BURSA W/O US     EUFLEXXA INJ PER DOSE       Plan:  Proceed with repeat injection #3 in the series of 3 injections. PROCEDURE NOTE:  PRE-PROCEDURE DIAGNOSIS: DJD knee  POST-PROCEDURE DIAGNOSIS: DJD knee  With the patient's permission, her left knee was prepped in standard sterile fashion with  Alcohol. The prefilled injection of the preferred visco supplementation ( Euflexxa 20 mg/ 2 ML ) was injected into the  lateral joint space compartment without difficulty. The patient tolerated the procedure(s) well without difficulty. A band-aid was applied. POST-PROCEDURE INSTRUCTIONS GIVEN TO PATIENT:   She was advised to ice the knee for 15-20 minutes to relieve any injection site related pain. Decrease activity for the next 24 to 48 hours. May use prescription or OTC pain relievers as needed    FOLLOW-UP: 6 weeks  As directed or call or return to clinic if these symptoms worsen or fail to improve as anticipated. If at any time you are concerned you may contact the office for further instructions or care.

## 2020-02-07 ENCOUNTER — OFFICE VISIT (OUTPATIENT)
Dept: SURGERY | Age: 75
End: 2020-02-07
Payer: MEDICARE

## 2020-02-07 VITALS
BODY MASS INDEX: 39.41 KG/M2 | HEART RATE: 58 BPM | DIASTOLIC BLOOD PRESSURE: 67 MMHG | SYSTOLIC BLOOD PRESSURE: 138 MMHG | WEIGHT: 212 LBS

## 2020-02-07 PROBLEM — I89.0 LYMPHEDEMA OF BOTH LOWER EXTREMITIES: Status: ACTIVE | Noted: 2020-02-07

## 2020-02-07 PROCEDURE — 4040F PNEUMOC VAC/ADMIN/RCVD: CPT | Performed by: SURGERY

## 2020-02-07 PROCEDURE — G8427 DOCREV CUR MEDS BY ELIG CLIN: HCPCS | Performed by: SURGERY

## 2020-02-07 PROCEDURE — 3017F COLORECTAL CA SCREEN DOC REV: CPT | Performed by: SURGERY

## 2020-02-07 PROCEDURE — 99214 OFFICE O/P EST MOD 30 MIN: CPT | Performed by: SURGERY

## 2020-02-07 PROCEDURE — 1036F TOBACCO NON-USER: CPT | Performed by: SURGERY

## 2020-02-07 PROCEDURE — G8399 PT W/DXA RESULTS DOCUMENT: HCPCS | Performed by: SURGERY

## 2020-02-07 PROCEDURE — G8484 FLU IMMUNIZE NO ADMIN: HCPCS | Performed by: SURGERY

## 2020-02-07 PROCEDURE — 1123F ACP DISCUSS/DSCN MKR DOCD: CPT | Performed by: SURGERY

## 2020-02-07 PROCEDURE — G8417 CALC BMI ABV UP PARAM F/U: HCPCS | Performed by: SURGERY

## 2020-02-07 PROCEDURE — 1090F PRES/ABSN URINE INCON ASSESS: CPT | Performed by: SURGERY

## 2020-02-07 RX ORDER — CHLORAL HYDRATE 500 MG
3000 CAPSULE ORAL DAILY
COMMUNITY
End: 2020-03-05 | Stop reason: CLARIF

## 2020-02-07 RX ORDER — ASCORBIC ACID 500 MG
500 TABLET ORAL DAILY
COMMUNITY

## 2020-02-07 ASSESSMENT — ENCOUNTER SYMPTOMS
GASTROINTESTINAL NEGATIVE: 1
RESPIRATORY NEGATIVE: 1
ALLERGIC/IMMUNOLOGIC NEGATIVE: 1
EYES NEGATIVE: 1

## 2020-02-07 NOTE — PROGRESS NOTES
Daily Progress Note   Agnieszka Lora MD      2/7/2020    Chief Complaint   Patient presents with    Other     Patient is here today to discuss the compression pump. HISTORY OF PRESENT ILLNESS:                The patient is a 75 y.o. female who presents with a check of her legs for lymphedema. She has a history of a stroke in October, 2019, and also a hole in her heart. She has been to see her cardiologist, Dr. Gaurav Velazquez, who said she was okay to have a lymphapress machine. She is wearing 30-40 mmHg stockings that are new. She also has been trying to keep moving to help her legs. Past Medical History:   Diagnosis Date    Asthma     Diverticulitis     Graves disease     Hyperlipidemia     Hypertension     Hyperthyroidism     Hypothyroidism     Mild persistent asthma without complication     Osteoarthritis     Peptic ulcer, unspecified site, unspecified as acute or chronic, without mention of hemorrhage or perforation     Prolonged emergence from general anesthesia     Sleep apnea     CPAP at night    Venous insufficiency     Wears glasses        Past Surgical History:   Procedure Laterality Date    BLEPHAROPLASTY Left     CHOLECYSTECTOMY  2001    COLONOSCOPY  2011    normal-daniel    FOOT SURGERY Left     spurs removed from left heel    HYSTERECTOMY      KNEE ARTHROSCOPY Left     ROTATOR CUFF REPAIR Right        Social History     Socioeconomic History    Marital status:       Spouse name: Not on file    Number of children: 3    Years of education: Not on file    Highest education level: Not on file   Occupational History    Not on file   Social Needs    Financial resource strain: Not on file    Food insecurity:     Worry: Not on file     Inability: Not on file    Transportation needs:     Medical: Not on file     Non-medical: Not on file   Tobacco Use    Smoking status: Former Smoker     Packs/day: 0.25     Years: 3.00     Pack years: 0.75     Types: Cigarettes Start date: 10/21/1961     Last attempt to quit: 10/21/1964     Years since quittin.3    Smokeless tobacco: Never Used   Substance and Sexual Activity    Alcohol use: Yes     Comment: SOCIALLY    Drug use: No    Sexual activity: Never   Lifestyle    Physical activity:     Days per week: Not on file     Minutes per session: Not on file    Stress: Not on file   Relationships    Social connections:     Talks on phone: Not on file     Gets together: Not on file     Attends Restoration service: Not on file     Active member of club or organization: Not on file     Attends meetings of clubs or organizations: Not on file     Relationship status: Not on file    Intimate partner violence:     Fear of current or ex partner: Not on file     Emotionally abused: Not on file     Physically abused: Not on file     Forced sexual activity: Not on file   Other Topics Concern    Not on file   Social History Narrative    Not on file       Family History   Problem Relation Age of Onset    Hypertension Mother     Colon Cancer Father     Diabetes Sister          Current Outpatient Medications:     vitamin C (ASCORBIC ACID) 500 MG tablet, Take 500 mg by mouth daily, Disp: , Rfl:     Omega-3 Fatty Acids (FISH OIL) 1000 MG CAPS, Take 3,000 mg by mouth daily, Disp: , Rfl:     Cholecalciferol (VITAMIN D3) 125 MCG (5000 UT) TABS, Take 50 each by mouth, Disp: , Rfl:     pravastatin (PRAVACHOL) 20 MG tablet, Take 1 tablet by mouth nightly, Disp: 90 tablet, Rfl: 3    nebivolol (BYSTOLIC) 10 MG tablet, Take 1 tablet by mouth daily Lot # [id number]  Qty: 4, Disp: 4 tablet, Rfl: 0    MAGNESIUM PO, Take by mouth, Disp: , Rfl:     levothyroxine (SYNTHROID) 150 MCG tablet, TAKE 1 TABLET BY MOUTH DAILY, Disp: 90 tablet, Rfl: 3    mometasone-formoterol (DULERA) 200-5 MCG/ACT inhaler, INHALE 2 PUFFS INTO THE LUNGS TWICE DAILY, Disp: 1 Inhaler, Rfl: 3    PROAIR  (90 Base) MCG/ACT inhaler, USE 2 INHALATIONS ORALLY   INTO THE LUNGS EVERY 6     HOURS AS NEEDEDFOR        WHEEZING, Disp: 8.5 g, Rfl: 4    ezetimibe (ZETIA) 10 MG tablet, TAKE 1 TABLET DAILY, Disp: 90 tablet, Rfl: 3    telmisartan-hydrochlorothiazide (MICARDIS HCT) 80-25 MG per tablet, TAKE 1 TABLET BY MOUTH DAILY, Disp: 90 tablet, Rfl: 3    cyanocobalamin ([employer]S VITAMIN B12) 1000 MCG tablet, Take 1 tablet by mouth daily, Disp: 30 tablet, Rfl: 3    aspirin 81 MG tablet, Take 81 mg by mouth daily. , Disp: , Rfl:     Montelukast sodium and Sulfa antibiotics    Vitals:    02/07/20 1040   BP: 138/67   Site: Left Upper Arm   Pulse: 58   Weight: 212 lb (96.2 kg)       Office Visit on 12/13/2019   Component Date Value Ref Range Status    Sodium 12/13/2019 142  136 - 145 mmol/L Final    Potassium 12/13/2019 4.0  3.5 - 5.1 mmol/L Final    Chloride 12/13/2019 103  99 - 110 mmol/L Final    CO2 12/13/2019 26  21 - 32 mmol/L Final    Anion Gap 12/13/2019 13  3 - 16 Final    Glucose 12/13/2019 99  70 - 99 mg/dL Final    BUN 12/13/2019 18  7 - 20 mg/dL Final    CREATININE 12/13/2019 0.6  0.6 - 1.2 mg/dL Final    GFR Non- 12/13/2019 >60  >60 Final    Comment: >60 mL/min/1.73m2 EGFR, calc. for ages 25 and older using the  MDRD formula (not corrected for weight), is valid for stable  renal function.  GFR  12/13/2019 >60  >60 Final    Comment: Chronic Kidney Disease: less than 60 ml/min/1.73 sq.m. Kidney Failure: less than 15 ml/min/1.73 sq.m. Results valid for patients 18 years and older.       Calcium 12/13/2019 9.7  8.3 - 10.6 mg/dL Final    Total Protein 12/13/2019 6.4  6.4 - 8.2 g/dL Final    Alb 12/13/2019 4.1  3.4 - 5.0 g/dL Final    Albumin/Globulin Ratio 12/13/2019 1.8  1.1 - 2.2 Final    Total Bilirubin 12/13/2019 0.3  0.0 - 1.0 mg/dL Final    Alkaline Phosphatase 12/13/2019 118  40 - 129 U/L Final    ALT 12/13/2019 14  10 - 40 U/L Final    AST 12/13/2019 13* 15 - 37 U/L Final    Globulin 12/13/2019 2.3  g/dL Final    Cholesterol, Total 12/13/2019 135  0 - 199 mg/dL Final    Triglycerides 12/13/2019 112  0 - 150 mg/dL Final    HDL 12/13/2019 67* 40 - 60 mg/dL Final    LDL Calculated 12/13/2019 46  <100 mg/dL Final    VLDL Cholesterol Calculated 12/13/2019 22  Not Established mg/dL Final       Review of Systems   Constitutional: Negative. HENT: Negative. Eyes: Negative. Respiratory: Negative. Cardiovascular: Positive for leg swelling. Negative for chest pain and palpitations. Gastrointestinal: Negative. Endocrine: Negative. Genitourinary: Negative. Musculoskeletal: Negative. Skin: Negative. Allergic/Immunologic: Negative. Neurological: Negative. Hematological: Negative. Psychiatric/Behavioral: Negative. Physical Exam  Vitals signs and nursing note reviewed. Constitutional:       General: She is not in acute distress. Appearance: Normal appearance. She is well-developed. She is obese. She is not ill-appearing or toxic-appearing. HENT:      Head: Normocephalic and atraumatic. Right Ear: External ear normal.      Left Ear: External ear normal.      Mouth/Throat:      Pharynx: No oropharyngeal exudate. Eyes:      General: No scleral icterus. Conjunctiva/sclera: Conjunctivae normal.      Pupils: Pupils are equal, round, and reactive to light. Neck:      Musculoskeletal: Normal range of motion and neck supple. No edema or erythema. Thyroid: No thyromegaly. Vascular: Normal carotid pulses. No carotid bruit or JVD. Trachea: No tracheal deviation. Cardiovascular:      Rate and Rhythm: Normal rate and regular rhythm. Pulses:           Femoral pulses are 2+ on the right side and 2+ on the left side. Dorsalis pedis pulses are 1+ on the right side and 1+ on the left side. Posterior tibial pulses are 2+ on the right side and 2+ on the left side. Heart sounds: Normal heart sounds and S1 normal. No murmur.       Comments: MEASUREMENTS 2020:    RIGHT ANKLE: 22.8 cm  RIGHT CALF: 45.6 cm     LEFT ANKLE:  23.4 cm   LEFT CALF: 45.3 cm       MEASUREMENTS 1/3/2020:    RIGHT ANKLE: 22.8 cm   RIGHT CALF: 46.4 cm     LEFT ANKLE: 23.3 cm   LEFT CALF: 47.7 cm     No abdominal bruits    Pulmonary:      Effort: Pulmonary effort is normal. No tachypnea, accessory muscle usage or respiratory distress. Breath sounds: Normal breath sounds. No stridor. No wheezing or rales. Abdominal:      General: Bowel sounds are normal. There is no distension. Palpations: Abdomen is soft. There is no mass. Tenderness: There is no abdominal tenderness. There is no guarding or rebound. Hernia: No hernia is present. There is no hernia in the ventral area, right inguinal area or left inguinal area. Genitourinary:     Comments: Rectal exam/stool guaiac not indicated. Musculoskeletal: Normal range of motion. General: No tenderness. Right shoulder: She exhibits normal range of motion, no deformity and no pain. Right lower le+ Pitting Edema present. Left lower le+ Pitting Edema present. Lymphadenopathy:      Head:      Right side of head: No submandibular, preauricular, posterior auricular or occipital adenopathy. Left side of head: No submandibular, preauricular, posterior auricular or occipital adenopathy. Cervical: No cervical adenopathy. Right cervical: No superficial, deep or posterior cervical adenopathy. Left cervical: No superficial, deep or posterior cervical adenopathy. Upper Body:      Right upper body: No supraclavicular or pectoral adenopathy. Left upper body: No supraclavicular or pectoral adenopathy. Skin:     General: Skin is warm and dry. Coloration: Skin is not pale. Findings: No bruising, erythema, laceration, lesion or rash. Neurological:      Mental Status: She is alert and oriented to person, place, and time. Cranial Nerves: No cranial nerve deficit. Sensory: No sensory deficit. Motor: No atrophy or abnormal muscle tone. Coordination: Coordination normal.      Gait: Gait normal.      Deep Tendon Reflexes: Reflexes are normal and symmetric. Psychiatric:         Speech: Speech normal.         Behavior: Behavior normal.         Thought Content: Thought content normal.         Judgment: Judgment normal.           ASSESSMENT:    Problem List Items Addressed This Visit     Morbid obesity due to excess calories ([facility] [address] - Primary    Lymphedema of both lower extremities          PLAN:    We will submit notes for Ms. Sultana to get her lymphapress machine, and would like to see her back in six months for an office visit to check her legs. Cassius Patterson MA am scribing for and in the presence of Dario Hu MD on this date of 02/07/20     I Nuha Edge MD personally performed the services described in this documentation as scribed by the Medical Assistant Ben Lane in my presence and it is both accurate and complete.           Electronically signed by Dario Hu MD on 2/7/2020 at 1:10 PM

## 2020-02-07 NOTE — PATIENT INSTRUCTIONS
We will submit notes for Ms. Sultana to get her lymphapress machine, and would like to see her back in six months for an office visit to check her legs.

## 2020-02-12 ENCOUNTER — TELEPHONE (OUTPATIENT)
Dept: CARDIOLOGY CLINIC | Age: 75
End: 2020-02-12

## 2020-02-12 ENCOUNTER — OFFICE VISIT (OUTPATIENT)
Dept: ORTHOPEDIC SURGERY | Age: 75
End: 2020-02-12
Payer: MEDICARE

## 2020-02-12 VITALS
HEART RATE: 69 BPM | HEIGHT: 62 IN | BODY MASS INDEX: 39.01 KG/M2 | WEIGHT: 212 LBS | DIASTOLIC BLOOD PRESSURE: 69 MMHG | TEMPERATURE: 97.2 F | SYSTOLIC BLOOD PRESSURE: 142 MMHG

## 2020-02-12 PROCEDURE — G8428 CUR MEDS NOT DOCUMENT: HCPCS | Performed by: PHYSICIAN ASSISTANT

## 2020-02-12 PROCEDURE — 4040F PNEUMOC VAC/ADMIN/RCVD: CPT | Performed by: PHYSICIAN ASSISTANT

## 2020-02-12 PROCEDURE — 1090F PRES/ABSN URINE INCON ASSESS: CPT | Performed by: PHYSICIAN ASSISTANT

## 2020-02-12 PROCEDURE — 3017F COLORECTAL CA SCREEN DOC REV: CPT | Performed by: PHYSICIAN ASSISTANT

## 2020-02-12 PROCEDURE — G8484 FLU IMMUNIZE NO ADMIN: HCPCS | Performed by: PHYSICIAN ASSISTANT

## 2020-02-12 PROCEDURE — 99213 OFFICE O/P EST LOW 20 MIN: CPT | Performed by: PHYSICIAN ASSISTANT

## 2020-02-12 PROCEDURE — 1036F TOBACCO NON-USER: CPT | Performed by: PHYSICIAN ASSISTANT

## 2020-02-12 PROCEDURE — 1123F ACP DISCUSS/DSCN MKR DOCD: CPT | Performed by: PHYSICIAN ASSISTANT

## 2020-02-12 PROCEDURE — G8417 CALC BMI ABV UP PARAM F/U: HCPCS | Performed by: PHYSICIAN ASSISTANT

## 2020-02-12 PROCEDURE — G8399 PT W/DXA RESULTS DOCUMENT: HCPCS | Performed by: PHYSICIAN ASSISTANT

## 2020-02-13 PROBLEM — M17.12 PRIMARY OSTEOARTHRITIS OF LEFT KNEE: Status: ACTIVE | Noted: 2020-02-13

## 2020-02-13 NOTE — PROGRESS NOTES
Subjective:      Patient ID: Raysa Brooks is a 75 y.o.  female. Chief Complaint   Patient presents with    Knee Problem     Left knee        HPI: She is here for follow up on left knee. She states symptoms have not improved with the recent viscosupplementation injections 2020. Pain is on average 10/10. Pain is worse with increased activity/ weight bearing. Pain improves with rest/ elevation. Review of Systems:   A full list of the ROS have been reviewed. These are recorded and signed in the chart.     Past Medical History:   Diagnosis Date    Asthma     Diverticulitis     Graves disease     Hyperlipidemia     Hypertension     Hyperthyroidism     Hypothyroidism     Mild persistent asthma without complication     Osteoarthritis     Peptic ulcer, unspecified site, unspecified as acute or chronic, without mention of hemorrhage or perforation     Prolonged emergence from general anesthesia     Sleep apnea     CPAP at night    Venous insufficiency     Wears glasses        Family History   Problem Relation Age of Onset    Hypertension Mother     Colon Cancer Father     Diabetes Sister        Past Surgical History:   Procedure Laterality Date    BLEPHAROPLASTY Left     CHOLECYSTECTOMY      COLONOSCOPY      normal-daniel    FOOT SURGERY Left     spurs removed from left heel    HYSTERECTOMY      KNEE ARTHROSCOPY Left     ROTATOR CUFF REPAIR Right        Social History     Occupational History    Not on file   Tobacco Use    Smoking status: Former Smoker     Packs/day: 0.25     Years: 3.00     Pack years: 0.75     Types: Cigarettes     Start date: 10/21/1961     Last attempt to quit: 10/21/1964     Years since quittin.3    Smokeless tobacco: Never Used   Substance and Sexual Activity    Alcohol use: Yes     Comment: SOCIALLY    Drug use: No    Sexual activity: Never       Current Outpatient Medications   Medication Sig Dispense Refill    vitamin C (ASCORBIC ACID) 500 to all extremities. No cyanosis. Digits are warm to touch. Capillary refill is less than 2 seconds. no edema noted. SKIN:  Examination of the skin over the upper extremities:  Reveals the skin to be intact without lacerations or abrasions. There are no significant erythema, rashes or skin lesions. X Rays: not performed in the office today:   Previous x-rays demonstrated advanced degenerative arthritis of the left knee. Assessment:       ICD-10-CM    1. Primary osteoarthritis of left knee M17.12         Plan:     The natural history of the patient's diagnosis as well as the treatment options were discussed in full and questions were answered. Risks and benefits of the treatment options also reviewed in detail. \    She would like to consider left knee arthroplasty. At this time, she does not have cardiac clearance. She sees her cardiologist in about 4 weeks. She is to return to the office after that appointment to discuss whether she is a surgical candidate. Weight loss, activity modification, home exercise therapy program, NSAID'S, dietary changes have been discussed as a means to help control the symptoms. She  was advised that NSAID-type medications have two very important potential side effects: gastrointestinal irritation including hemorrhage and renal injuries. She was asked to take the medication with food and to stop if she experiences any GI upset. I asked her to call for vomiting, abdominal pain or black/bloody stools. She should have renal function testing per his medical provider periodically. The patient expresses understanding of these issues and questions were answered. Follow Up:  Call or return to clinic prn if these symptoms worsen or fail to improve as anticipated.

## 2020-02-21 NOTE — TELEPHONE ENCOUNTER
Jewell Lauren returned call and she is agreeable to date/time. Went over pre-procedure instructions. She verbalized understanding.

## 2020-02-27 ENCOUNTER — TELEPHONE (OUTPATIENT)
Dept: SURGERY | Age: 75
End: 2020-02-27

## 2020-03-04 ENCOUNTER — OFFICE VISIT (OUTPATIENT)
Dept: ORTHOPEDIC SURGERY | Age: 75
End: 2020-03-04
Payer: MEDICARE

## 2020-03-04 VITALS
BODY MASS INDEX: 39.38 KG/M2 | HEART RATE: 68 BPM | TEMPERATURE: 98.8 F | HEIGHT: 62 IN | DIASTOLIC BLOOD PRESSURE: 74 MMHG | WEIGHT: 214 LBS | SYSTOLIC BLOOD PRESSURE: 145 MMHG

## 2020-03-04 PROCEDURE — 1123F ACP DISCUSS/DSCN MKR DOCD: CPT | Performed by: PHYSICIAN ASSISTANT

## 2020-03-04 PROCEDURE — 99212 OFFICE O/P EST SF 10 MIN: CPT | Performed by: PHYSICIAN ASSISTANT

## 2020-03-04 PROCEDURE — G8427 DOCREV CUR MEDS BY ELIG CLIN: HCPCS | Performed by: PHYSICIAN ASSISTANT

## 2020-03-04 PROCEDURE — 1036F TOBACCO NON-USER: CPT | Performed by: PHYSICIAN ASSISTANT

## 2020-03-04 PROCEDURE — 4040F PNEUMOC VAC/ADMIN/RCVD: CPT | Performed by: PHYSICIAN ASSISTANT

## 2020-03-04 PROCEDURE — G8399 PT W/DXA RESULTS DOCUMENT: HCPCS | Performed by: PHYSICIAN ASSISTANT

## 2020-03-04 PROCEDURE — G8484 FLU IMMUNIZE NO ADMIN: HCPCS | Performed by: PHYSICIAN ASSISTANT

## 2020-03-04 PROCEDURE — 1090F PRES/ABSN URINE INCON ASSESS: CPT | Performed by: PHYSICIAN ASSISTANT

## 2020-03-04 PROCEDURE — G8417 CALC BMI ABV UP PARAM F/U: HCPCS | Performed by: PHYSICIAN ASSISTANT

## 2020-03-04 NOTE — PROGRESS NOTES
Subjective:      Patient ID: Garcia Edwards is a 67 y.o.  female. Chief Complaint   Patient presents with    Knee Problem     Left knee        HPI: She is here for follow up on left knee(s). She states symptoms have improved 25 % with the recent Visco Supplementation injection performed on 2020. Pain is on average 6/10. Pain is worse with increased activity/ weight bearing. Pain improves with rest/ elevation. She does have an upcoming appointment with her cardiologist.  She will find out at that time whether she needs to undergo a cardiac surgical procedure. She will also ask about medical clearance for knee arthroplasty. Review of Systems:   Negative for fever or chills.   Past Medical History:   Diagnosis Date    Asthma     Diverticulitis     Graves disease     Hyperlipidemia     Hypertension     Hyperthyroidism     Hypothyroidism     Mild persistent asthma without complication     Osteoarthritis     Peptic ulcer, unspecified site, unspecified as acute or chronic, without mention of hemorrhage or perforation     Prolonged emergence from general anesthesia     Sleep apnea     CPAP at night    Venous insufficiency     Wears glasses        Family History   Problem Relation Age of Onset    Hypertension Mother     Colon Cancer Father     Diabetes Sister        Past Surgical History:   Procedure Laterality Date    BLEPHAROPLASTY Left     CHOLECYSTECTOMY      COLONOSCOPY      normal-daniel    FOOT SURGERY Left     spurs removed from left heel    HYSTERECTOMY      KNEE ARTHROSCOPY Left     ROTATOR CUFF REPAIR Right        Social History     Occupational History    Not on file   Tobacco Use    Smoking status: Former Smoker     Packs/day: 0.25     Years: 3.00     Pack years: 0.75     Types: Cigarettes     Start date: 10/21/1961     Last attempt to quit: 10/21/1964     Years since quittin.4    Smokeless tobacco: Never Used   Substance and Sexual Activity    Alcohol sensation to light touch. Motor testing  5/5 in all major motor groups including hand intrinsics. Radial, Median and Ulnar nerves are intact. Siegel's Sign absent. VASCULAR EXAM:  Examination of the upper extremities shows intact perfusion to all extremities. No cyanosis. Digits are warm to touch. Capillary refill is less than 2 seconds. mild edema noted. SKIN:  Examination of the skin over the upper extremities:  Reveals the skin to be intact without lacerations or abrasions. There are no significant erythema, rashes or skin lesions. X Rays: not performed in the office today:     Assessment:       ICD-10-CM    1. Primary osteoarthritis of left knee M17.12         Plan:     The natural history of the patient's diagnosis as well as the treatment options were discussed in full and questions were answered. Risks and benefits of the treatment options also reviewed in detail. Weight loss, activity modification, home exercise therapy program, dietary changes have been discussed as a means to help control the symptoms. Discussed knee arthroplasty as the definitive treatment of her left knee arthritis. Follow Up:  Call or return to clinic prn if these symptoms worsen or fail to improve as anticipated.

## 2020-03-09 ENCOUNTER — HOSPITAL ENCOUNTER (EMERGENCY)
Age: 75
Discharge: HOME | End: 2020-03-09
Attending: EMERGENCY MEDICINE
Payer: MEDICARE

## 2020-03-09 ENCOUNTER — APPOINTMENT (OUTPATIENT)
Dept: GENERAL RADIOLOGY | Age: 75
End: 2020-03-09
Payer: MEDICARE

## 2020-03-09 VITALS
HEART RATE: 64 BPM | OXYGEN SATURATION: 99 % | DIASTOLIC BLOOD PRESSURE: 76 MMHG | WEIGHT: 218.7 LBS | HEIGHT: 61 IN | TEMPERATURE: 97.6 F | BODY MASS INDEX: 41.29 KG/M2 | SYSTOLIC BLOOD PRESSURE: 157 MMHG | RESPIRATION RATE: 17 BRPM

## 2020-03-09 LAB
A/G RATIO: 1.1 (ref 1.1–2.2)
ALBUMIN SERPL-MCNC: 3.6 G/DL (ref 3.4–5)
ALP BLD-CCNC: 91 U/L (ref 40–129)
ALT SERPL-CCNC: 19 U/L (ref 10–40)
ANION GAP SERPL CALCULATED.3IONS-SCNC: 7 MMOL/L (ref 3–16)
AST SERPL-CCNC: 40 U/L (ref 15–37)
BASOPHILS ABSOLUTE: 0 K/UL (ref 0–0.2)
BASOPHILS RELATIVE PERCENT: 0.8 %
BILIRUB SERPL-MCNC: <0.2 MG/DL (ref 0–1)
BUN BLDV-MCNC: 17 MG/DL (ref 7–20)
CALCIUM SERPL-MCNC: 8.8 MG/DL (ref 8.3–10.6)
CHLORIDE BLD-SCNC: 102 MMOL/L (ref 99–110)
CO2: 25 MMOL/L (ref 21–32)
CREAT SERPL-MCNC: 0.9 MG/DL (ref 0.6–1.2)
EOSINOPHILS ABSOLUTE: 0 K/UL (ref 0–0.6)
EOSINOPHILS RELATIVE PERCENT: 1.1 %
GFR AFRICAN AMERICAN: >60
GFR NON-AFRICAN AMERICAN: >60
GLOBULIN: 3.4 G/DL
GLUCOSE BLD-MCNC: 97 MG/DL (ref 70–99)
HCT VFR BLD CALC: 37.6 % (ref 36–48)
HEMOGLOBIN: 12.3 G/DL (ref 12–16)
LYMPHOCYTES ABSOLUTE: 1.5 K/UL (ref 1–5.1)
LYMPHOCYTES RELATIVE PERCENT: 35.8 %
MCH RBC QN AUTO: 28.3 PG (ref 26–34)
MCHC RBC AUTO-ENTMCNC: 32.7 G/DL (ref 31–36)
MCV RBC AUTO: 86.5 FL (ref 80–100)
MONOCYTES ABSOLUTE: 0.5 K/UL (ref 0–1.3)
MONOCYTES RELATIVE PERCENT: 12.2 %
NEUTROPHILS ABSOLUTE: 2.1 K/UL (ref 1.7–7.7)
NEUTROPHILS RELATIVE PERCENT: 50.1 %
PDW BLD-RTO: 14.1 % (ref 12.4–15.4)
PLATELET # BLD: 170 K/UL (ref 135–450)
PMV BLD AUTO: 7.2 FL (ref 5–10.5)
POTASSIUM SERPL-SCNC: 3.9 MMOL/L (ref 3.5–5.1)
PRO-BNP: 475 PG/ML (ref 0–449)
RBC # BLD: 4.34 M/UL (ref 4–5.2)
REASON FOR REJECTION: NORMAL
REASON FOR REJECTION: NORMAL
REJECTED TEST: NORMAL
REJECTED TEST: NORMAL
SODIUM BLD-SCNC: 134 MMOL/L (ref 136–145)
TOTAL PROTEIN: 7 G/DL (ref 6.4–8.2)
TROPONIN: <0.01 NG/ML
WBC # BLD: 4.2 K/UL (ref 4–11)

## 2020-03-09 PROCEDURE — 93005 ELECTROCARDIOGRAM TRACING: CPT | Performed by: EMERGENCY MEDICINE

## 2020-03-09 PROCEDURE — 6370000000 HC RX 637 (ALT 250 FOR IP): Performed by: EMERGENCY MEDICINE

## 2020-03-09 PROCEDURE — 99285 EMERGENCY DEPT VISIT HI MDM: CPT

## 2020-03-09 PROCEDURE — 94640 AIRWAY INHALATION TREATMENT: CPT

## 2020-03-09 PROCEDURE — 84132 ASSAY OF SERUM POTASSIUM: CPT

## 2020-03-09 PROCEDURE — 71045 X-RAY EXAM CHEST 1 VIEW: CPT

## 2020-03-09 PROCEDURE — 84484 ASSAY OF TROPONIN QUANT: CPT

## 2020-03-09 PROCEDURE — 94761 N-INVAS EAR/PLS OXIMETRY MLT: CPT

## 2020-03-09 PROCEDURE — 36415 COLL VENOUS BLD VENIPUNCTURE: CPT

## 2020-03-09 PROCEDURE — 83880 ASSAY OF NATRIURETIC PEPTIDE: CPT

## 2020-03-09 PROCEDURE — 85025 COMPLETE CBC W/AUTO DIFF WBC: CPT

## 2020-03-09 PROCEDURE — 80053 COMPREHEN METABOLIC PANEL: CPT

## 2020-03-09 RX ORDER — PREDNISONE 20 MG/1
60 TABLET ORAL ONCE
Status: COMPLETED | OUTPATIENT
Start: 2020-03-09 | End: 2020-03-09

## 2020-03-09 RX ORDER — PREDNISONE 20 MG/1
TABLET ORAL
Qty: 18 TABLET | Refills: 0 | Status: SHIPPED | OUTPATIENT
Start: 2020-03-09 | End: 2020-03-19

## 2020-03-09 RX ORDER — NEBIVOLOL 10 MG/1
10 TABLET ORAL ONCE
Status: DISCONTINUED | OUTPATIENT
Start: 2020-03-09 | End: 2020-03-09

## 2020-03-09 RX ORDER — IPRATROPIUM BROMIDE AND ALBUTEROL SULFATE 2.5; .5 MG/3ML; MG/3ML
1 SOLUTION RESPIRATORY (INHALATION) ONCE
Status: COMPLETED | OUTPATIENT
Start: 2020-03-09 | End: 2020-03-09

## 2020-03-09 RX ADMIN — PREDNISONE 60 MG: 20 TABLET ORAL at 18:45

## 2020-03-09 RX ADMIN — IPRATROPIUM BROMIDE AND ALBUTEROL SULFATE 1 AMPULE: .5; 3 SOLUTION RESPIRATORY (INHALATION) at 19:02

## 2020-03-09 ASSESSMENT — PAIN DESCRIPTION - ORIENTATION: ORIENTATION: MID

## 2020-03-09 ASSESSMENT — PAIN DESCRIPTION - LOCATION: LOCATION: CHEST

## 2020-03-09 ASSESSMENT — PAIN DESCRIPTION - PAIN TYPE: TYPE: ACUTE PAIN

## 2020-03-09 ASSESSMENT — PAIN DESCRIPTION - DESCRIPTORS: DESCRIPTORS: ACHING

## 2020-03-09 ASSESSMENT — PAIN SCALES - GENERAL: PAINLEVEL_OUTOF10: 4

## 2020-03-09 NOTE — ED PROVIDER NOTES
conjunctival injection. Pupils equal round reactive. No discharge. ENT: Rollene Agnes is clear. Oropharynx is moist without erythema. Neck: Supple without adenopathy or JVD. Heart: Regular rate and rhythm. No murmurs or gallops noted. Lungs: Breath sounds equal bilaterally with mild expiratory wheezing. No rales or rhonchi. Abdomen: Obese, soft, nontender. No masses organomegaly. Musculoskeletal: No lower extremity edema. Intact symmetrical distal pulses. Skin: Warm and dry, good turgor. No pallor or cyanosis. Neuro: Awake, alert, oriented. No focal motor deficits. DIFFERENTIAL DIAGNOSIS   Differential includes but is not limited to viral URI, influenza, bronchitis, asthma exacerbation, pneumonia, congestive heart failure, angina, myocardial infarction, pulmonary embolus. DIAGNOSTIC RESULTS     EKG: All EKG's are interpreted by Emory Sandhu MD in the absence of a cardiologist.    Sinus bradycardia, rate of 58, voltage criteria for LVH, nonspecific ST-T changes. Rhythm strip shows a sinus bradycardia with a rate of 58, ME interval 172 ms, QRS of 88 ms with no other ectopy as interpreted by me. This was compared to an EKG dated 4/20/2018, sinus bradycardia is new. The nonspecific T wave flattening is less pronounced in comparison to the previous tracing. RADIOLOGY:   Non-plain film images such as CT, Ultrasound and MRI are read by the radiologist. Plain radiographic images are visualized and preliminarily interpreted Emory Sandhu MD with the below findings:      Interpretation per the Radiologist below, if available at the time of this note:    XR CHEST PORTABLE   Final Result   No acute cardiopulmonary disease.                ED BEDSIDE ULTRASOUND:   Performed by ED Physician - none    LABS:  Labs Reviewed   COMPREHENSIVE METABOLIC PANEL - Abnormal; Notable for the following components:       Result Value    Sodium 134 (*)     AST 40 (*)     All other components within Vilma Sena MD  03/09/20 1986

## 2020-03-10 LAB
EKG ATRIAL RATE: 58 BPM
EKG DIAGNOSIS: NORMAL
EKG P AXIS: 42 DEGREES
EKG P-R INTERVAL: 172 MS
EKG Q-T INTERVAL: 432 MS
EKG QRS DURATION: 88 MS
EKG QTC CALCULATION (BAZETT): 424 MS
EKG R AXIS: -6 DEGREES
EKG T AXIS: 9 DEGREES
EKG VENTRICULAR RATE: 58 BPM

## 2020-03-10 PROCEDURE — 93010 ELECTROCARDIOGRAM REPORT: CPT | Performed by: INTERNAL MEDICINE

## 2020-03-12 ENCOUNTER — TELEPHONE (OUTPATIENT)
Dept: CARDIOLOGY CLINIC | Age: 75
End: 2020-03-12

## 2020-03-17 NOTE — TELEPHONE ENCOUNTER
Patient called in literally as I am in her chart looking at [address] transferred the call. She would like to cancel her JUSTINE this week on 3/19/20 with Dr. Jeanine Mcdermott. I told her that is the exact reason that I was going to call her, due to the COVID-19 situation/mitigation. She is currently getting over PNA and remains on medical therapy. We dicussed calling with any q/c over the next few week. Will call once we get clearance to reschedule. She vu and has no q/c at this time. Cancelled on [facility] and will e-mail Elise Curtis.

## 2020-04-30 ENCOUNTER — TELEPHONE (OUTPATIENT)
Dept: CARDIOLOGY CLINIC | Age: 75
End: 2020-04-30

## 2020-05-04 ENCOUNTER — OFFICE VISIT (OUTPATIENT)
Dept: PRIMARY CARE CLINIC | Age: 75
End: 2020-05-04

## 2020-05-05 LAB
SARS-COV-2: NOT DETECTED
SOURCE: NORMAL

## 2020-05-08 ENCOUNTER — HOSPITAL ENCOUNTER (OUTPATIENT)
Dept: CARDIAC CATH/INVASIVE PROCEDURES | Age: 75
Discharge: HOME | End: 2020-05-08
Payer: MEDICARE

## 2020-05-08 PROCEDURE — 93312 ECHO TRANSESOPHAGEAL: CPT

## 2020-05-08 PROCEDURE — 6360000002 HC RX W HCPCS

## 2020-05-08 PROCEDURE — 99152 MOD SED SAME PHYS/QHP 5/>YRS: CPT

## 2020-05-08 PROCEDURE — 93325 DOPPLER ECHO COLOR FLOW MAPG: CPT

## 2020-05-08 PROCEDURE — 2580000003 HC RX 258

## 2020-05-08 PROCEDURE — 93321 DOPPLER ECHO F-UP/LMTD STD: CPT

## 2020-05-08 RX ORDER — SODIUM CHLORIDE 0.9 % (FLUSH) 0.9 %
10 SYRINGE (ML) INJECTION PRN
Status: DISCONTINUED | OUTPATIENT
Start: 2020-05-08 | End: 2020-05-09 | Stop reason: HOSPADM

## 2020-05-08 RX ORDER — SODIUM CHLORIDE 9 MG/ML
INJECTION, SOLUTION INTRAVENOUS CONTINUOUS
Status: DISCONTINUED | OUTPATIENT
Start: 2020-05-08 | End: 2020-05-09 | Stop reason: HOSPADM

## 2020-05-08 RX ORDER — SODIUM CHLORIDE 0.9 % (FLUSH) 0.9 %
10 SYRINGE (ML) INJECTION EVERY 12 HOURS SCHEDULED
Status: DISCONTINUED | OUTPATIENT
Start: 2020-05-08 | End: 2020-05-09 | Stop reason: HOSPADM

## 2020-05-12 ENCOUNTER — TELEPHONE (OUTPATIENT)
Dept: CARDIOLOGY CLINIC | Age: 75
End: 2020-05-12

## 2020-05-12 NOTE — TELEPHONE ENCOUNTER
Elayne Brock is calling in this afternoon requesting to speak to Masonbuddy Clive, [address] [facility] [place]. She states that she had a JUSTINE with Dr. Prashant Millan last Friday, 05/08/2020. She wants to know how big the hole in her heart is. She also states that she has an appointment with Dr. Prashant Millan on 06/23/2020 but wants to know if she can get in any sooner because she needs to have knee surgery and she can't schedule it until she sees Dr. Prashant Millan. You can reach Elayne Brock at [phone].

## 2020-05-14 NOTE — TELEPHONE ENCOUNTER
Reached out to patient with instructions and keep routine follow up. She would like to continue her knee workup gel injections vs. Surgery. She will have ortho reach out to Dr. Maryellen Mike.

## 2020-06-01 ENCOUNTER — OFFICE VISIT (OUTPATIENT)
Dept: ORTHOPEDIC SURGERY | Age: 75
End: 2020-06-01
Payer: MEDICARE

## 2020-06-01 VITALS — TEMPERATURE: 98.2 F | HEIGHT: 61 IN | WEIGHT: 220 LBS | BODY MASS INDEX: 41.54 KG/M2

## 2020-06-01 PROCEDURE — 4040F PNEUMOC VAC/ADMIN/RCVD: CPT | Performed by: PHYSICIAN ASSISTANT

## 2020-06-01 PROCEDURE — 20610 DRAIN/INJ JOINT/BURSA W/O US: CPT | Performed by: PHYSICIAN ASSISTANT

## 2020-06-01 PROCEDURE — G8427 DOCREV CUR MEDS BY ELIG CLIN: HCPCS | Performed by: PHYSICIAN ASSISTANT

## 2020-06-01 PROCEDURE — 1123F ACP DISCUSS/DSCN MKR DOCD: CPT | Performed by: PHYSICIAN ASSISTANT

## 2020-06-01 PROCEDURE — G8399 PT W/DXA RESULTS DOCUMENT: HCPCS | Performed by: PHYSICIAN ASSISTANT

## 2020-06-01 PROCEDURE — 1036F TOBACCO NON-USER: CPT | Performed by: PHYSICIAN ASSISTANT

## 2020-06-01 PROCEDURE — 1090F PRES/ABSN URINE INCON ASSESS: CPT | Performed by: PHYSICIAN ASSISTANT

## 2020-06-01 PROCEDURE — G8417 CALC BMI ABV UP PARAM F/U: HCPCS | Performed by: PHYSICIAN ASSISTANT

## 2020-06-01 PROCEDURE — 99213 OFFICE O/P EST LOW 20 MIN: CPT | Performed by: PHYSICIAN ASSISTANT

## 2020-06-01 NOTE — PROGRESS NOTES
Subjective:      Patient ID: Shoshana Dakin is a 67 y.o.  female. Chief Complaint   Patient presents with    Knee Problem     Left knee        HPI: She is here for follow-up on her left knee-osteoarthritis. Status post Visco supplementation injections 2020 with mild relief. Recently evaluated by cardiology and has been given cardiac clearance to undergo total joint arthroplasty. Experiencing significant left knee pain 7/10. Symptoms are worse with weightbearing activities such as standing and walking. Moderate discomfort at rest.  Affecting ADLs. Review of Systems:   Negative for fever or chills. Negative for numbness or tingling of the left lower extremity. Past Medical History:   Diagnosis Date    Asthma     Diverticulitis     Graves disease     Hyperlipidemia     Hypertension     Hyperthyroidism     Hypothyroidism     Mild persistent asthma without complication     Osteoarthritis     Peptic ulcer, unspecified site, unspecified as acute or chronic, without mention of hemorrhage or perforation     Prolonged emergence from general anesthesia     Sleep apnea     CPAP at night    Venous insufficiency     Wears glasses        Family History   Problem Relation Age of Onset    Hypertension Mother     Colon Cancer Father     Diabetes Sister        Past Surgical History:   Procedure Laterality Date    BLEPHAROPLASTY Left     CHOLECYSTECTOMY      COLONOSCOPY      normal-daniel    FOOT SURGERY Left     spurs removed from left heel    HYSTERECTOMY      KNEE ARTHROSCOPY Left     ROTATOR CUFF REPAIR Right        Social History     Occupational History    Not on file   Tobacco Use    Smoking status: Former Smoker     Packs/day: 0.25     Years: 3.00     Pack years: 0.75     Types: Cigarettes     Start date: 10/21/1961     Last attempt to quit: 10/21/1964     Years since quittin.8    Smokeless tobacco: Never Used   Substance and Sexual Activity    Alcohol use:  Yes arthroplasty discussed. Risk and benefits of corticosteroid intra-articular injection was discussed today. All questions were answered to her satisfaction. She verbally consented to proceed with intra-articular injection today. Cortisone Injection                                                       PROCEDURE NOTE:     Pre op Diagnosis:  left knee pain     Post op Diagnosis: Same  With the patient's permission, her left knee was prepped  in standard sterile fashion with  Alcohol and 2 cc of 0.25% Marcaine and 1 cc of Kenalog 40 mg was injected into the left lateral compartment  without difficulty. The patient tolerated this well without difficulty. A band-aid was applied. The patient was advised to ice the knee for 15-20 minutes to relieve any injection site related pain. I have recommended that she follow-up after July 21, 2020 to discuss total joint arthroplasty with Dr. Hector Rahman. If she decides to forego knee arthroplasty then she would be a candidate for repeat viscosupplementation injection. Follow Up:   Call or return to clinic prn if these symptoms worsen or fail to improve as anticipated.

## 2020-07-07 ENCOUNTER — OFFICE VISIT (OUTPATIENT)
Dept: SURGERY | Age: 75
End: 2020-07-07
Payer: MEDICARE

## 2020-07-07 ENCOUNTER — TELEPHONE (OUTPATIENT)
Dept: ORTHOPEDIC SURGERY | Age: 75
End: 2020-07-07

## 2020-07-07 VITALS
DIASTOLIC BLOOD PRESSURE: 77 MMHG | WEIGHT: 219 LBS | HEART RATE: 62 BPM | SYSTOLIC BLOOD PRESSURE: 162 MMHG | BODY MASS INDEX: 41.38 KG/M2

## 2020-07-07 PROCEDURE — 4040F PNEUMOC VAC/ADMIN/RCVD: CPT | Performed by: SURGERY

## 2020-07-07 PROCEDURE — G8417 CALC BMI ABV UP PARAM F/U: HCPCS | Performed by: SURGERY

## 2020-07-07 PROCEDURE — 1090F PRES/ABSN URINE INCON ASSESS: CPT | Performed by: SURGERY

## 2020-07-07 PROCEDURE — G8427 DOCREV CUR MEDS BY ELIG CLIN: HCPCS | Performed by: SURGERY

## 2020-07-07 PROCEDURE — 99214 OFFICE O/P EST MOD 30 MIN: CPT | Performed by: SURGERY

## 2020-07-07 PROCEDURE — G8399 PT W/DXA RESULTS DOCUMENT: HCPCS | Performed by: SURGERY

## 2020-07-07 PROCEDURE — 1123F ACP DISCUSS/DSCN MKR DOCD: CPT | Performed by: SURGERY

## 2020-07-07 PROCEDURE — 1036F TOBACCO NON-USER: CPT | Performed by: SURGERY

## 2020-07-07 ASSESSMENT — ENCOUNTER SYMPTOMS
RESPIRATORY NEGATIVE: 1
EYES NEGATIVE: 1
ALLERGIC/IMMUNOLOGIC NEGATIVE: 1
GASTROINTESTINAL NEGATIVE: 1

## 2020-07-07 NOTE — PROGRESS NOTES
Daily Progress Note   Kathleen Mobley MD      7/7/2020    Chief Complaint   Patient presents with    Leg Swelling     Patient is here for 6 month check bilateral leg swelling and stocking check. Patient wears compression stockings daily. HISTORY OF PRESENT ILLNESS:                The patient is a 67 y.o. female who presents with 6 month check for bilateral leg swelling. Ms. Frank Cannon has been using her lymphapress about 3 hours a day. She has been having trouble walking because of left knee problems. She has been wearing her compression stockings, but they are wearing out and she needs a new prescription. Past Medical History:   Diagnosis Date    Asthma     Diverticulitis     Graves disease     Hyperlipidemia     Hypertension     Hyperthyroidism     Hypothyroidism     Mild persistent asthma without complication     Osteoarthritis     Peptic ulcer, unspecified site, unspecified as acute or chronic, without mention of hemorrhage or perforation     Prolonged emergence from general anesthesia     Sleep apnea     CPAP at night    Venous insufficiency     Wears glasses        Past Surgical History:   Procedure Laterality Date    BLEPHAROPLASTY Left     CHOLECYSTECTOMY  2001    COLONOSCOPY  2011    normal-daniel    FOOT SURGERY Left     spurs removed from left heel    HYSTERECTOMY      KNEE ARTHROSCOPY Left     ROTATOR CUFF REPAIR Right        Social History     Socioeconomic History    Marital status:       Spouse name: Not on file    Number of children: 3    Years of education: Not on file    Highest education level: Not on file   Occupational History    Not on file   Social Needs    Financial resource strain: Not on file    Food insecurity     Worry: Not on file     Inability: Not on file    Transportation needs     Medical: Not on file     Non-medical: Not on file   Tobacco Use    Smoking status: Former Smoker     Packs/day: 0.25     Years: 3.00     Pack years: 0.75 DAILY, Disp: 1 Inhaler, Rfl: 3    PROAIR  (90 Base) MCG/ACT inhaler, USE 2 INHALATIONS ORALLY   INTO THE LUNGS EVERY 6     HOURS AS NEEDEDFOR        WHEEZING, Disp: 8.5 g, Rfl: 4    cyanocobalamin ([employer] VITAMIN B12) 1000 MCG tablet, Take 1 tablet by mouth daily, Disp: 30 tablet, Rfl: 3    aspirin 81 MG tablet, Take 81 mg by mouth daily. , Disp: , Rfl:     Montelukast sodium and Sulfa antibiotics    Vitals:    07/07/20 0948   BP: (!) 162/77   Site: Right Upper Arm   Pulse: 62   Weight: 219 lb (99.3 kg)       Orders Only on 05/04/2020   Component Date Value Ref Range Status    SARS-CoV-2 05/04/2020 Not Detected  Not Detected Final    Comment: This test was developed and its performance  characteristics determined by [employer]. This test has not been FDA  cleared or approved. This test has been  authorized by FDA under an Emergency Use  Authorization (EUA). This test is only  authorized for the duration of time the  declaration that circumstances exist  justifying the authorization of the  emergency use of in vitro diagnostic tests  for detection of SARS-CoV-2 virus and/or  diagnosis of COVID-19 infection under  section 564(b)(1) of the Act, 21 U. S.C.  834PFO-1(V)(5), unless the authorization is  terminated or revoked sooner. When diagnostic testing is negative, the  possibility of a false negative result  should be considered in the context of a  patient's recent exposures and the presence  of clinical signs and symptoms consistent  with COVID-19. An individual without  symptoms of COVID-19 and who is not shedding  SARS-CoV-2 virus would expect to have a  negative (not detected) result in this  assay. Performe                           d at:  [address]  [id number]  : Franck Jesus MD, Phone:  [phone]      Source 05/04/2020 Oral swab   Final       Review of Systems   Constitutional: Negative. HENT: Negative. Eyes: Negative. Respiratory: Negative. Cardiovascular: Positive for leg swelling. Negative for chest pain and palpitations. Gastrointestinal: Negative. Endocrine: Negative. Genitourinary: Negative. Musculoskeletal: Negative. Skin: Negative. Allergic/Immunologic: Negative. Neurological: Negative. Hematological: Negative. Psychiatric/Behavioral: Negative. All other systems reviewed and are negative. Physical Exam  Vitals signs and nursing note reviewed. Constitutional:       General: She is not in acute distress. Appearance: Normal appearance. She is well-developed. She is obese. She is not ill-appearing or toxic-appearing. HENT:      Head: Normocephalic and atraumatic. Right Ear: External ear normal.      Left Ear: External ear normal.      Mouth/Throat:      Pharynx: No oropharyngeal exudate. Eyes:      General: No scleral icterus. Conjunctiva/sclera: Conjunctivae normal.      Pupils: Pupils are equal, round, and reactive to light. Neck:      Musculoskeletal: Normal range of motion and neck supple. No edema or erythema. Thyroid: No thyromegaly. Vascular: Normal carotid pulses. No carotid bruit or JVD. Trachea: No tracheal deviation. Cardiovascular:      Rate and Rhythm: Normal rate and regular rhythm. Pulses:           Femoral pulses are 2+ on the right side and 2+ on the left side. Dorsalis pedis pulses are 1+ on the right side and 1+ on the left side. Posterior tibial pulses are 2+ on the right side and 2+ on the left side. Heart sounds: Normal heart sounds and S1 normal. No murmur.       Comments:     MEASUREMENTS 7/7/2020:    RIGHT ANKLE: 22.9 cm  RIGHT CALF: 47.2 cm     LEFT ANKLE: 23.5 cm   LEFT CALF: 48.2 cm     MEASUREMENTS 2/7/2020:    RIGHT ANKLE: 22.8 cm  RIGHT CALF: 45.6 cm     LEFT ANKLE:  23.4 cm   LEFT CALF: 45.3 cm       MEASUREMENTS 1/3/2020:    RIGHT ANKLE: 22.8 cm   RIGHT CALF: 46.4 cm     LEFT ANKLE: 23.3 cm   LEFT Psychiatric:         Speech: Speech normal.         Behavior: Behavior normal.         Thought Content: Thought content normal.         Judgment: Judgment normal.       Ms. Verónica Mims had a hysterectomy at 44years old due to fibroids and bleeding. She has had three children. ASSESSMENT:    Problem List Items Addressed This Visit     Lymphedema of both lower extremities - Primary      Other Visit Diagnoses     Leg swelling            She uses her Lymphapress 3 hours a day usually in the afternoon or evening she also wears her stockings during the day some a little disappointed that her measurements are bigger than last time but her symptoms of pain are under control. PLAN:    Mrs. Verónica iMms should follow up with us in six months for a leg check. Crystal Bruno MA am scribing for and in the presence of Constantin Abreu MD on this date of 07/07/20    I Sidney Schofield MD personally performed the services described in this documentation as scribed by the Medical Assistant Anand Lane in my presence and it is both accurate and complete.         Electronically signed by Constantin Abreu MD on 7/7/2020 at 10:24 AM

## 2020-07-10 PROBLEM — R53.83 OTHER FATIGUE: Status: ACTIVE | Noted: 2020-07-10

## 2020-07-10 PROBLEM — M17.12 PRIMARY OSTEOARTHRITIS OF LEFT KNEE: Status: RESOLVED | Noted: 2020-02-13 | Resolved: 2020-07-10

## 2020-07-28 ENCOUNTER — HOSPITAL ENCOUNTER (OUTPATIENT)
Dept: WOMENS IMAGING | Age: 75
Discharge: HOME | End: 2020-07-28
Payer: MEDICARE

## 2020-07-28 PROCEDURE — 77080 DXA BONE DENSITY AXIAL: CPT

## 2020-08-05 NOTE — PROGRESS NOTES
[address]  Cardiology Progress Note    Sonu Solis  1945    August 7, 2020      Reason for Referral: acute ischemic stroke, PFO per TTE with bubble study     CC: \"I am doing good. \"       Subjective:     History of Present Illness:    Sonu Solis is a 67 y.o. patient with a PMH significant for former smoker, Graves Disease, HTN, HLD, and who is seeing us today for management of recent acute ischemic stroke, PFO revealed per TTE with bubble study. Neurology followed during admission and we discussed possible etiology. No physical limitations. Use of cane. She has hx of panic attack resolved by xanax. States she has been under a lot of family stress. He daughter lives with her. 30 day Event monitor 11/2019 SR with 1 episodes of 20 beat atrial run. Today, she is here for routine follow up. She is using a cane for ambulation due to her knee. She is going to physical therapy. She denies having any recurrent stroke like symptoms. Patient denies exertional chest pain/pressure, dyspnea at rest, CLARKE, PND, orthopnea, palpitations, lightheadedness, weight changes, changes in LE edema, and syncope. Patient reports compliance to her medications. Past Medical History:   has a past medical history of Asthma, Diverticulitis, Graves disease, Hyperlipidemia, Hypertension, Hyperthyroidism, Hypothyroidism, Mild persistent asthma without complication, Osteoarthritis, Peptic ulcer, unspecified site, unspecified as acute or chronic, without mention of hemorrhage or perforation, Prolonged emergence from general anesthesia, Sleep apnea, Venous insufficiency, and Wears glasses. Surgical History:   has a past surgical history that includes Hysterectomy; Cholecystectomy (2001); Colonoscopy (2011); Rotator cuff repair (Right); Foot surgery (Left); Knee arthroscopy (Left); and blepharoplasty (Left). Social History:   reports that she quit smoking about 55 years ago. Her smoking use included cigarettes.  She started smoking about 58 years ago. She has a 0.75 pack-year smoking history. She has never used smokeless tobacco. She reports current alcohol use. She reports that she does not use drugs. Family History:  family history includes Colon Cancer in her father; Diabetes in her sister; Hypertension in her mother. Home Medications:  Were reviewed and are listed in nursing record and/or below  Prior to Admission medications    Medication Sig Start Date End Date Taking? Authorizing Provider   levothyroxine (SYNTHROID) 137 MCG tablet Take 1 tablet by mouth daily 7/13/20  Yes Teodora Santos MD   Multiple Vitamins-Minerals (THERAPEUTIC MULTIVITAMIN-MINERALS) tablet Take 1 tablet by mouth daily   Yes Historical Provider, MD   ALPRAZolam Nadja Lerner) 0.5 MG tablet Take 1 tablet by mouth nightly as needed for Sleep for up to 30 days.  7/10/20 8/9/20 Yes Teodora Santos MD   ezetimibe (ZETIA) 10 MG tablet TAKE 1 TABLET DAILY 5/26/20  Yes Teodora Santos MD   nebivolol (BYSTOLIC) 10 MG tablet Take 1 tablet by mouth daily 3/2/20  Yes Teodora Santos MD   telmisartan-hydrochlorothiazide (MICARDIS HCT) 80-25 MG per tablet TAKE 1 TABLET BY MOUTH DAILY 2/27/20  Yes Teodora Santos MD   vitamin C (ASCORBIC ACID) 500 MG tablet Take 500 mg by mouth daily   Yes Historical Provider, MD   Cholecalciferol (VITAMIN D3) 125 MCG (5000 UT) TABS Take 50 each by mouth   Yes Historical Provider, MD   pravastatin (PRAVACHOL) 20 MG tablet Take 1 tablet by mouth nightly 1/13/20  Yes Jorge Richardson MD   MAGNESIUM PO Take by mouth   Yes Historical Provider, MD   mometasone-formoterol (DULERA) 200-5 MCG/ACT inhaler INHALE 2 PUFFS INTO THE LUNGS TWICE DAILY 9/24/19  Yes Alfredo Hollis MD   PROAIR  (01 Base) MCG/ACT inhaler USE 2 INHALATIONS ORALLY   INTO THE LUNGS EVERY 6     HOURS AS NEEDEDFOR        WHEEZING 7/15/19  Yes Teodora Santos MD   cyanocobalamin ([employer] VITAMIN B12) 1000 MCG tablet Take 1 tablet by mouth mucosa, no pharyngeal erythema. Nose: Nares normal. No drainage or sinus tenderness. Neck: Supple, symmetrical, trachea midline. No adenopathy. No tenderness/mass/nodules. No carotid bruit or elevated JVD. Lungs:   Respiratory Effort: Normal   Auscultation: Clear to auscultation bilaterally, respirations unlabored. No wheeze, rales   Chest Wall:  No tenderness or deformity. Cardiovascular:    Pulses  Palpation: normal   Ascultation: Regular rate, S1/ S2 normal. No murmur, rub, or gallop. 2+ radial and pedal pulses, symmetric  Carotid  Femoral   Abdomen and Gastrointestinal:   Soft, non-tender, bowel sounds active. Liver and Spleen  Masses   Musculoskeletal: No muscle wasting  Back  Gait   Extremities: Extremities normal, atraumatic. No cyanosis or edema. No cyanosis clubbing       Skin: Inspection and palpation performed, no rashes or lesions. Pysch: Normal mood and affect.  Alert and oriented to time place person   Neurologic: Normal gross motor and sensory exam.       Labs     All labs have been reviewed    Lab Results   Component Value Date    WBC 5.7 07/10/2020    RBC 4.65 07/10/2020    HGB 13.7 07/10/2020    HCT 41.9 07/10/2020    MCV 90.2 07/10/2020    RDW 13.7 07/10/2020     07/10/2020     Lab Results   Component Value Date     07/07/2020    K 4.7 07/07/2020    K 4.5 04/22/2018     07/07/2020    CO2 27 07/07/2020    BUN 16 07/07/2020    CREATININE 0.7 07/07/2020    GFRAA >60 07/07/2020    GFRAA >60 02/28/2013    AGRATIO 1.4 07/07/2020    LABGLOM >60 07/07/2020    LABGLOM 62.5 07/05/2011    GLUCOSE 97 07/07/2020    GLUCOSE 98 07/05/2011    PROT 7.0 07/07/2020    PROT 6.7 02/28/2013    CALCIUM 9.6 07/07/2020    BILITOT 0.3 07/07/2020    ALKPHOS 105 07/07/2020    AST 20 07/07/2020    ALT 17 07/07/2020     No results found for: [facility]  Lab Results   Component Value Date    LABA1C 5.6 10/18/2019     Lab Results   Component Value Date    TROPONINI <0.01 03/09/2020       Cardiac, Vascular and Imaging Data all Personally Reviewed in Detail by Myself      EK2020 Sinus bradycardia    Echocardiogram: 10/17/19  Summary   Suboptimal image quality.   Normal left ventricle size, wall thickness, and systolic function with an   estimated ejection fraction of 55-60%. No regional wall motion abnormalities are seen.   Normal right ventricular size and function.   The left atrium is mildly dilated.   Mild aortic and tricuspid regurgitation.   Trivial mitral regurgitation.   A bubble study was performed and shows evidence of right to left shunting   consistent with a patent foramen ovale    Stress Test: none     Cath: none     Other imaging:     MRI brain: 10/17/19  Punctate acute infarct in the hiram.  No intracranial hemorrhage or mass   effect. CT head wo contrast:10/17/19  No acute intracranial abnormality    CTA head and neck:10/17/19  Unremarkable CTA of the head and neck    JUSTINE 2020  Normal LV function  RV Function is normal  Mild MR  mild-moderate AI  A bubble study was performed and fails to show evidence of right to left  shunting due to the increased pressure of the left atrium     Assessment and Plan     Acute Ischemic stroke acute infarcts noted in the right posterior hiram region  No signs/symptoms or recurrent stroke. Continue Asa and statin therapy. Patent foramen ovale   Continue Asa and statin therapy. Hypertension, essential   Controlled. Continue current medical management. Hyperlipemia, mixed   Continue Pravachol and Zetia. Graves Disease  Managed by PCP. Follow up in 7 months. Would like to have knee replacement in the near futisure. She would be cleared from a cardiac perspective. Will plan to return in follow up     Thank you for allowing us to participate in the care of Denia Buckner. Please do not hesitate to contact me if you have any questions.     Yessi Muniz MD, [facility] [address]  [facility], [address] [place] [facility]  Ph: [phone]  Fax: [phone]    This note was scribed in the presence of Dr Yusra Jimenez, by Norberto Presley RN  Physician Attestation:  The scribes documentation has been prepared under my direction and personally reviewed by me in its entirety. I confirm that the note above accurately reflects all work, treatment, procedures, and medical decision making performed by me.

## 2020-08-07 ENCOUNTER — OFFICE VISIT (OUTPATIENT)
Dept: CARDIOLOGY CLINIC | Age: 75
End: 2020-08-07
Payer: MEDICARE

## 2020-08-07 VITALS
BODY MASS INDEX: 41.16 KG/M2 | DIASTOLIC BLOOD PRESSURE: 70 MMHG | HEIGHT: 61 IN | SYSTOLIC BLOOD PRESSURE: 136 MMHG | HEART RATE: 60 BPM | OXYGEN SATURATION: 96 % | TEMPERATURE: 97.9 F | WEIGHT: 218 LBS

## 2020-08-07 PROCEDURE — 4040F PNEUMOC VAC/ADMIN/RCVD: CPT | Performed by: INTERNAL MEDICINE

## 2020-08-07 PROCEDURE — 1090F PRES/ABSN URINE INCON ASSESS: CPT | Performed by: INTERNAL MEDICINE

## 2020-08-07 PROCEDURE — G8417 CALC BMI ABV UP PARAM F/U: HCPCS | Performed by: INTERNAL MEDICINE

## 2020-08-07 PROCEDURE — 1123F ACP DISCUSS/DSCN MKR DOCD: CPT | Performed by: INTERNAL MEDICINE

## 2020-08-07 PROCEDURE — G8399 PT W/DXA RESULTS DOCUMENT: HCPCS | Performed by: INTERNAL MEDICINE

## 2020-08-07 PROCEDURE — 1036F TOBACCO NON-USER: CPT | Performed by: INTERNAL MEDICINE

## 2020-08-07 PROCEDURE — G8427 DOCREV CUR MEDS BY ELIG CLIN: HCPCS | Performed by: INTERNAL MEDICINE

## 2020-08-07 PROCEDURE — 93000 ELECTROCARDIOGRAM COMPLETE: CPT | Performed by: INTERNAL MEDICINE

## 2020-08-07 PROCEDURE — 99213 OFFICE O/P EST LOW 20 MIN: CPT | Performed by: INTERNAL MEDICINE

## 2020-08-07 NOTE — PATIENT INSTRUCTIONS
Patient Education        Heart-Healthy Diet: Care Instructions  Your Care Instructions     A heart-healthy diet has lots of vegetables, fruits, nuts, beans, and whole grains, and is low in salt. It limits foods that are high in saturated fat, such as meats, cheeses, and fried foods. It may be hard to change your diet, but even small changes can lower your risk of heart attack and heart disease. Follow-up care is a key part of your treatment and safety. Be sure to make and go to all appointments, and call your doctor if you are having problems. It's also a good idea to know your test results and keep a list of the medicines you take. How can you care for yourself at home? Watch your portions  · Learn what a serving is. A \"serving\" and a \"portion\" are not always the same thing. Make sure that you are not eating larger portions than are recommended. For example, a serving of pasta is ½ cup. A serving size of meat is 2 to 3 ounces. A 3-ounce serving is about the size of a deck of cards. Measure serving sizes until you are good at [place]" them. Keep in mind that restaurants often serve portions that are 2 or 3 times the size of one serving. · To keep your energy level up and keep you from feeling hungry, eat often but in smaller portions. · Eat only the number of calories you need to stay at a healthy weight. If you need to lose weight, eat fewer calories than your body burns (through exercise and other physical activity). Eat more fruits and vegetables  · Eat a variety of fruit and vegetables every day. Dark green, deep orange, red, or yellow fruits and vegetables are especially good for you. Examples include spinach, carrots, peaches, and berries. · Keep carrots, celery, and other veggies handy for snacks. Buy fruit that is in season and store it where you can see it so that you will be tempted to eat it. · Cook dishes that have a lot of veggies in them, such as stir-fries and soups.   Limit saturated and trans fat  · Read food labels, and try to avoid saturated and trans fats. They increase your risk of heart disease. · Use olive or canola oil when you cook. · Bake, broil, grill, or steam foods instead of frying them. · Choose lean meats instead of high-fat meats such as hot dogs and sausages. Cut off all visible fat when you prepare meat. · Eat fish, skinless poultry, and meat alternatives such as soy products instead of high-fat meats. Soy products, such as tofu, may be especially good for your heart. · Choose low-fat or fat-free milk and dairy products. Eat foods high in fiber  · Eat a variety of grain products every day. Include whole-grain foods that have lots of fiber and nutrients. Examples of whole-grain foods include oats, whole wheat bread, and brown rice. · Buy whole-grain breads and cereals, instead of white bread or pastries. Limit salt and sodium  · Limit how much salt and sodium you eat to help lower your blood pressure. · Taste food before you salt it. Add only a little salt when you think you need it. With time, your taste buds will adjust to less salt. · Eat fewer snack items, fast foods, and other high-salt, processed foods. Check food labels for the amount of sodium in packaged foods. · Choose low-sodium versions of canned goods (such as soups, vegetables, and beans). Limit sugar  · Limit drinks and foods with added sugar. These include candy, desserts, and soda pop. Limit alcohol  · Limit alcohol to no more than 2 drinks a day for men and 1 drink a day for women. Too much alcohol can cause health problems. When should you call for help? Watch closely for changes in your health, and be sure to contact your doctor if:  · You would like help planning heart-healthy meals. Where can you learn more? Go to [email or web address]. org and sign in to your [employer] account.  Enter V137 in the [employer] box to learn more about \"Heart-Healthy Diet: Care

## 2020-08-10 ENCOUNTER — OFFICE VISIT (OUTPATIENT)
Dept: PULMONOLOGY | Age: 75
End: 2020-08-10
Payer: MEDICARE

## 2020-08-10 VITALS
HEART RATE: 92 BPM | TEMPERATURE: 98.2 F | DIASTOLIC BLOOD PRESSURE: 68 MMHG | OXYGEN SATURATION: 94 % | WEIGHT: 218 LBS | BODY MASS INDEX: 41.16 KG/M2 | HEIGHT: 61 IN | RESPIRATION RATE: 16 BRPM | SYSTOLIC BLOOD PRESSURE: 134 MMHG

## 2020-08-10 PROCEDURE — 4040F PNEUMOC VAC/ADMIN/RCVD: CPT | Performed by: INTERNAL MEDICINE

## 2020-08-10 PROCEDURE — G8417 CALC BMI ABV UP PARAM F/U: HCPCS | Performed by: INTERNAL MEDICINE

## 2020-08-10 PROCEDURE — G8427 DOCREV CUR MEDS BY ELIG CLIN: HCPCS | Performed by: INTERNAL MEDICINE

## 2020-08-10 PROCEDURE — 99214 OFFICE O/P EST MOD 30 MIN: CPT | Performed by: INTERNAL MEDICINE

## 2020-08-10 PROCEDURE — 1123F ACP DISCUSS/DSCN MKR DOCD: CPT | Performed by: INTERNAL MEDICINE

## 2020-08-10 PROCEDURE — 1036F TOBACCO NON-USER: CPT | Performed by: INTERNAL MEDICINE

## 2020-08-10 PROCEDURE — G8399 PT W/DXA RESULTS DOCUMENT: HCPCS | Performed by: INTERNAL MEDICINE

## 2020-08-10 PROCEDURE — 1090F PRES/ABSN URINE INCON ASSESS: CPT | Performed by: INTERNAL MEDICINE

## 2020-08-10 ASSESSMENT — ASTHMA QUESTIONNAIRES
QUESTION_1 LAST FOUR WEEKS HOW MUCH OF THE TIME DID YOUR ASTHMA KEEP YOU FROM GETTING AS MUCH DONE AT WORK, SCHOOL OR AT HOME: 4
QUESTION_5 LAST FOUR WEEKS HOW WOULD YOU RATE YOUR ASTHMA CONTROL: 4
QUESTION_4 LAST FOUR WEEKS HOW OFTEN HAVE YOU USED YOUR RESCUE INHALER OR NEBULIZER MEDICATION (SUCH AS ALBUTEROL): 4
QUESTION_3 LAST FOUR WEEKS HOW OFTEN DID YOUR ASTHMA SYMPTOMS (WHEEZING, COUGHING, SHORTNESS OF BREATH, CHEST TIGHTNESS OR PAIN) WAKE YOU UP AT NIGHT OR EARLIER THAN USUAL IN THE MORNING: 4
QUESTION_2 LAST FOUR WEEKS HOW OFTEN HAVE YOU HAD SHORTNESS OF BREATH: 4
ACT_TOTALSCORE: 20

## 2020-08-10 ASSESSMENT — SLEEP AND FATIGUE QUESTIONNAIRES
HOW LIKELY ARE YOU TO NOD OFF OR FALL ASLEEP WHILE SITTING INACTIVE IN A PUBLIC PLACE: 0
HOW LIKELY ARE YOU TO NOD OFF OR FALL ASLEEP WHILE WATCHING TV: 1
HOW LIKELY ARE YOU TO NOD OFF OR FALL ASLEEP WHILE SITTING AND TALKING TO SOMEONE: 0
HOW LIKELY ARE YOU TO NOD OFF OR FALL ASLEEP IN A CAR, WHILE STOPPED FOR A FEW MINUTES IN TRAFFIC: 0
HOW LIKELY ARE YOU TO NOD OFF OR FALL ASLEEP WHEN YOU ARE A PASSENGER IN A CAR FOR AN HOUR WITHOUT A BREAK: 1
HOW LIKELY ARE YOU TO NOD OFF OR FALL ASLEEP WHILE SITTING AND READING: 1
HOW LIKELY ARE YOU TO NOD OFF OR FALL ASLEEP WHILE SITTING QUIETLY AFTER LUNCH WITHOUT ALCOHOL: 0
HOW LIKELY ARE YOU TO NOD OFF OR FALL ASLEEP WHILE LYING DOWN TO REST IN THE AFTERNOON WHEN CIRCUMSTANCES PERMIT: 0
ESS TOTAL SCORE: 3

## 2020-08-10 NOTE — ASSESSMENT & PLAN NOTE
Merle Mcgovern  is deriving benefit from PAP demonstrated by improved [place], AHI, symptoms. PAP download information:  Usage > 4 hours  100 %   Pressure setting  15.3 cwp    AHI with usage  1.6          She has some complaints of insomnia. It appears that she is waking up in the middle the night to go the bathroom. She has significant issues with lower extremity edema requiring compression stockings. Therefore, she was advised on the mechanism of nocturia with lower extremity edema. I do not believe this is a fixable issue.

## 2020-08-10 NOTE — PROGRESS NOTES
REASON FOR CONSULTATION/CC:    Chief Complaint   Patient presents with    Asthma     f/u Asthma    Sleep Apnea     cpap        Consult at request of   Abdelrahman Jane MD    PCP: Abdelrahman Jane MD    HISTORY OF PRESENT ILLNESS: Echo Foley is a 67y.o. year old female with a history of asthma who presents        LYNNE  Patient has issues with waking up in the middle the night secondary to urination. She has to use compression stockings from lower extremities edema. allergic rhinitis  Controlled. Off Flonase      Asthma  Only using Dulera as few times per week. Using albuterol as needed but very rare. ASTHMA CONTROL TEST 8/10/2020 9/24/2019 4/16/2019 2/11/2019 8/15/2018   In the past 4 weeks, how much of the time did your asthma keep you from getting as much done at work, school or at home? 4 4 4 3 3   During the past 4 weeks, how often have you had shortness of breath? 4 4 4 4 3   During the past 4 weeks, how often did your asthma symptoms (wheezing, coughing, shortness of breath, chest tightness or pain) wake you up at night or earlier than usual in the morning? 4 5 5 4 2   During the past 4 weeks, how often have you used your rescue inhaler or nebulizer medication (such as albuterol)? 4 4 5 4 3   How would you rate your asthma control during the past 4 weeks?  4 5 5 4 3   Asthma Control Test Total Score 20 22 23 19 14               PAST MEDICAL HISTORY:  Past Medical History:   Diagnosis Date    Asthma     Diverticulitis     Graves disease     Hyperlipidemia     Hypertension     Hyperthyroidism     Hypothyroidism     Mild persistent asthma without complication     Osteoarthritis     Peptic ulcer, unspecified site, unspecified as acute or chronic, without mention of hemorrhage or perforation     Prolonged emergence from general anesthesia     Sleep apnea     CPAP at night    Venous insufficiency     Wears glasses        PAST SURGICAL HISTORY:  Past Surgical History: Procedure Laterality Date    BLEPHAROPLASTY Left     CHOLECYSTECTOMY  2001    COLONOSCOPY  2011    normal-daniel    FOOT SURGERY Left     spurs removed from left heel    HYSTERECTOMY      KNEE ARTHROSCOPY Left     ROTATOR CUFF REPAIR Right        FAMILY HISTORY:  family history includes Colon Cancer in her father; Diabetes in her sister; Hypertension in her mother. SOCIAL HISTORY:   reports that she quit smoking about 55 years ago. Her smoking use included cigarettes. She started smoking about 58 years ago. She has a 0.75 pack-year smoking history. She has never used smokeless tobacco.      ALLERGIES:  Patient is allergic to montelukast sodium and sulfa antibiotics. REVIEW OF SYSTEMS:  Constitutional: Negative for fever    HENT: Negative for sore throat  Respiratory: Negative for dyspnea, cough  Cardiovascular: Negative for chest pain  Gastrointestinal: Negative for vomiting, diarrhea   Skin: Negative for rash  Psychiatric/Behavorial: Negative for anxiety     Objective:   PHYSICAL EXAM:  Blood pressure 134/68, pulse 92, temperature 98.2 °F (36.8 °C), temperature source Oral, resp. rate 16, height 5' 1\" (1.549 m), weight 218 lb (98.9 kg), SpO2 94 %, not currently breastfeeding.'  Gen: No distress. Body mass index is 41.19 kg/m². ENT:   Resp: No accessory muscle use. No crackles. No wheezes. No rhonchi. CV: Regular rate. Regular rhythm. No murmur or rub. No edema. Skin: Warm, dry, normal texture and turgor. No nodule on exposed extremities. M/S: No cyanosis. No clubbing. No joint deformity. Psych: Oriented x 3. No anxiety. Awake. Alert. Intact judgement and insight. Good Mood / Affect.   Memory appears in tact     Current Outpatient Medications   Medication Sig Dispense Refill    levothyroxine (SYNTHROID) 137 MCG tablet Take 1 tablet by mouth daily 30 tablet 3    Multiple Vitamins-Minerals (THERAPEUTIC MULTIVITAMIN-MINERALS) tablet Take 1 tablet by mouth daily      ezetimibe (ZETIA) 10 MG tablet TAKE 1 TABLET DAILY 90 tablet 3    nebivolol (BYSTOLIC) 10 MG tablet Take 1 tablet by mouth daily 90 tablet 1    telmisartan-hydrochlorothiazide (MICARDIS HCT) 80-25 MG per tablet TAKE 1 TABLET BY MOUTH DAILY 90 tablet 3    vitamin C (ASCORBIC ACID) 500 MG tablet Take 500 mg by mouth daily      Cholecalciferol (VITAMIN D3) 125 MCG (5000 UT) TABS Take 50 each by mouth      pravastatin (PRAVACHOL) 20 MG tablet Take 1 tablet by mouth nightly 90 tablet 3    MAGNESIUM PO Take by mouth      mometasone-formoterol (DULERA) 200-5 MCG/ACT inhaler INHALE 2 PUFFS INTO THE LUNGS TWICE DAILY (Patient taking differently: Only using once or twice weekly) 1 Inhaler 3    PROAIR  (90 Base) MCG/ACT inhaler USE 2 INHALATIONS ORALLY   INTO THE LUNGS EVERY 6     HOURS AS NEEDEDFOR        WHEEZING 8.5 g 4    cyanocobalamin ([employer]S VITAMIN B12) 1000 MCG tablet Take 1 tablet by mouth daily 30 tablet 3    aspirin 81 MG tablet Take 81 mg by mouth daily. No current facility-administered medications for this visit. Data Reviewed:   CBC and Renal reviewed  Last CBC  Lab Results   Component Value Date    WBC 5.7 07/10/2020    RBC 4.65 07/10/2020    HGB 13.7 07/10/2020    MCV 90.2 07/10/2020     07/10/2020     Last Renal  Lab Results   Component Value Date     07/07/2020    K 4.7 07/07/2020    K 4.5 04/22/2018     07/07/2020    CO2 27 07/07/2020    CO2 25 03/09/2020    CO2 26 12/13/2019    BUN 16 07/07/2020    CREATININE 0.7 07/07/2020    GLUCOSE 97 07/07/2020    GLUCOSE 98 07/05/2011    CALCIUM 9.6 07/07/2020       Last ABG  POC Blood Gas: No results found for: POCPH, POCPCO2, POCPO2, POCHCO3, NBEA, SHDO7UPN  No results for input(s): PH, PCO2, PO2, HCO3, BE, O2SAT in the last 72 hours. Assessment:     · allergic rhinitis   · Asthma    Plan:        Problem List Items Addressed This Visit     Other allergic rhinitis     Well-controlled. Worse time a year is the winter.   Therefore, she will likely need to restart Flonase closer to December. LYNNE (obstructive sleep apnea)     Rene Thomson  is deriving benefit from PAP demonstrated by improved [place], AHI, symptoms. PAP download information:  Usage > 4 hours  100 %   Pressure setting  15.3 cwp    AHI with usage  1.6          She has some complaints of insomnia. It appears that she is waking up in the middle the night to go the bathroom. She has significant issues with lower extremity edema requiring compression stockings. Therefore, she was advised on the mechanism of nocturia with lower extremity edema. I do not believe this is a fixable issue. Mild persistent asthma without complication     She is doing very well. Has not used needed albuterol for quite some time and using Dulera a few times per week. I advised her to stop using Dulera at this point and consider restarting during the winter, her worst time a year.

## 2020-08-10 NOTE — ASSESSMENT & PLAN NOTE
Well-controlled. Worse time a year is the winter. Therefore, she will likely need to restart Flonase closer to December.

## 2020-08-10 NOTE — ASSESSMENT & PLAN NOTE
She is doing very well. Has not used needed albuterol for quite some time and using Dulera a few times per week. I advised her to stop using Dulera at this point and consider restarting during the winter, her worst time a year.

## 2020-08-17 PROBLEM — R53.83 OTHER FATIGUE: Status: RESOLVED | Noted: 2020-07-10 | Resolved: 2020-08-17

## 2020-08-17 PROBLEM — G45.9 TIA (TRANSIENT ISCHEMIC ATTACK): Status: RESOLVED | Noted: 2019-10-17 | Resolved: 2020-08-17

## 2020-08-17 PROBLEM — J30.89 OTHER ALLERGIC RHINITIS: Status: RESOLVED | Noted: 2018-05-15 | Resolved: 2020-08-17

## 2021-01-12 ENCOUNTER — OFFICE VISIT (OUTPATIENT)
Dept: SURGERY | Age: 76
End: 2021-01-12

## 2021-01-12 VITALS
WEIGHT: 216 LBS | BODY MASS INDEX: 40.78 KG/M2 | HEIGHT: 61 IN | DIASTOLIC BLOOD PRESSURE: 85 MMHG | SYSTOLIC BLOOD PRESSURE: 139 MMHG

## 2021-01-12 DIAGNOSIS — I89.0 LYMPHEDEMA OF BOTH LOWER EXTREMITIES: Primary | ICD-10-CM

## 2021-01-12 DIAGNOSIS — E78.1 PURE HYPERTRIGLYCERIDEMIA: ICD-10-CM

## 2021-01-12 DIAGNOSIS — I63.9 ACUTE ISCHEMIC STROKE (HCC): ICD-10-CM

## 2021-01-12 DIAGNOSIS — M62.08 DIASTASIS RECTI: ICD-10-CM

## 2021-01-12 PROCEDURE — 99024 POSTOP FOLLOW-UP VISIT: CPT | Performed by: SURGERY

## 2021-01-12 ASSESSMENT — ENCOUNTER SYMPTOMS
GASTROINTESTINAL NEGATIVE: 1
RESPIRATORY NEGATIVE: 1
EYES NEGATIVE: 1
ALLERGIC/IMMUNOLOGIC NEGATIVE: 1

## 2021-01-12 NOTE — PROGRESS NOTES
Daily Progress Note   Jennifer Maldonado MD      1/12/2021    Chief Complaint   Patient presents with    Follow-up     6 months for leg swelling. pt reports legs are about the same, claudication in right calf. no pain otherwise         HISTORY OF PRESENT ILLNESS:                The patient is a 67 y.o. female who presents with a six month follow up for leg swelling. Mrs. Tiana Jauregui says she has been having charley horses/cramps on the calf of the right leg at night. She says she gets up and walks to get rid of it. Past Medical History:   Diagnosis Date    Asthma     Diverticulitis     Graves disease     Hyperlipidemia     Hypertension     Hyperthyroidism     Hypothyroidism     Mild persistent asthma without complication     Osteoarthritis     Peptic ulcer, unspecified site, unspecified as acute or chronic, without mention of hemorrhage or perforation     Prolonged emergence from general anesthesia     Sleep apnea     CPAP at night    Venous insufficiency     Wears glasses        Past Surgical History:   Procedure Laterality Date    BLEPHAROPLASTY Left     CHOLECYSTECTOMY  2001    COLONOSCOPY  2011    normal-daniel    FOOT SURGERY Left     spurs removed from left heel    HYSTERECTOMY      KNEE ARTHROSCOPY Left     ROTATOR CUFF REPAIR Right        Social History     Socioeconomic History    Marital status:       Spouse name: Not on file    Number of children: 3    Years of education: Not on file    Highest education level: Not on file   Occupational History    Not on file   Social Needs    Financial resource strain: Not on file    Food insecurity     Worry: Not on file     Inability: Not on file    Transportation needs     Medical: Not on file     Non-medical: Not on file   Tobacco Use    Smoking status: Former Smoker     Packs/day: 0.25     Years: 3.00     Pack years: 0.75     Types: Cigarettes     Start date: 10/21/1961     Quit date: 10/21/1964     Years since quitting: 56.2    Smokeless tobacco: Never Used   Substance and Sexual Activity    Alcohol use: Yes     Comment: SOCIALLY-wine    Drug use: No    Sexual activity: Never   Lifestyle    Physical activity     Days per week: Not on file     Minutes per session: Not on file    Stress: Not on file   Relationships    Social connections     Talks on phone: Not on file     Gets together: Not on file     Attends Faith service: Not on file     Active member of club or organization: Not on file     Attends meetings of clubs or organizations: Not on file     Relationship status: Not on file    Intimate partner violence     Fear of current or ex partner: Not on file     Emotionally abused: Not on file     Physically abused: Not on file     Forced sexual activity: Not on file   Other Topics Concern    Not on file   Social History Narrative    Not on file       Family History   Problem Relation Age of Onset    Hypertension Mother     Colon Cancer Father     Diabetes Sister          Current Outpatient Medications:     amLODIPine (NORVASC) 2.5 MG tablet, TAKE 1 TABLET BY MOUTH DAILY, Disp: 30 tablet, Rfl: 3    nebivolol (BYSTOLIC) 10 MG tablet, TAKE 1 TABLET DAILY, Disp: 90 tablet, Rfl: 3    dicyclomine (BENTYL) 20 MG tablet, Take 1 tablet by mouth every 6 hours, Disp: 120 tablet, Rfl: 1    ALPRAZolam (XANAX) 0.5 MG tablet, Take 1 tablet by mouth nightly as needed for Sleep for up to 30 days. , Disp: 30 tablet, Rfl: 0    levothyroxine (SYNTHROID) 125 MCG tablet, TAKE 1 TABLET BY MOUTH DAILY, Disp: 90 tablet, Rfl: 3    Multiple Vitamins-Minerals (THERAPEUTIC MULTIVITAMIN-MINERALS) tablet, Take 1 tablet by mouth daily, Disp: , Rfl:     ezetimibe (ZETIA) 10 MG tablet, TAKE 1 TABLET DAILY, Disp: 90 tablet, Rfl: 3    telmisartan-hydrochlorothiazide (MICARDIS HCT) 80-25 MG per tablet, TAKE 1 TABLET BY MOUTH DAILY, Disp: 90 tablet, Rfl: 3    vitamin C (ASCORBIC ACID) 500 MG tablet, Take 500 mg by mouth daily, Disp: , Rfl:    Cholecalciferol (VITAMIN D3) 125 MCG (5000 UT) TABS, Take 50 each by mouth, Disp: , Rfl:     pravastatin (PRAVACHOL) 20 MG tablet, Take 1 tablet by mouth nightly, Disp: 90 tablet, Rfl: 3    MAGNESIUM PO, Take by mouth, Disp: , Rfl:     mometasone-formoterol (DULERA) 200-5 MCG/ACT inhaler, INHALE 2 PUFFS INTO THE LUNGS TWICE DAILY (Patient taking differently: Only using once or twice weekly), Disp: 1 Inhaler, Rfl: 3    PROAIR  (90 Base) MCG/ACT inhaler, USE 2 INHALATIONS ORALLY   INTO THE LUNGS EVERY 6     HOURS AS NEEDEDFOR        WHEEZING, Disp: 8.5 g, Rfl: 4    cyanocobalamin ([employer]S VITAMIN B12) 1000 MCG tablet, Take 1 tablet by mouth daily, Disp: 30 tablet, Rfl: 3    aspirin 81 MG tablet, Take 81 mg by mouth daily. , Disp: , Rfl:     Montelukast sodium and Sulfa antibiotics    Vitals:    01/12/21 1006   BP: 139/85   Site: Left Upper Arm   Position: Sitting   Cuff Size: Medium Adult   Weight: 216 lb (98 kg)   Height: 5' 1\" (1.549 m)       Orders Only on 12/10/2020   Component Date Value Ref Range Status    Sodium 12/10/2020 141  136 - 145 mmol/L Final    Potassium 12/10/2020 4.5  3.5 - 5.1 mmol/L Final    Chloride 12/10/2020 105  99 - 110 mmol/L Final    CO2 12/10/2020 28  21 - 32 mmol/L Final    Anion Gap 12/10/2020 8  3 - 16 Final    Glucose 12/10/2020 91  70 - 99 mg/dL Final    BUN 12/10/2020 24* 7 - 20 mg/dL Final    CREATININE 12/10/2020 0.8  0.6 - 1.2 mg/dL Final    GFR Non- 12/10/2020 >60  >60 Final    Comment: >60 mL/min/1.73m2 EGFR, calc. for ages 25 and older using the  MDRD formula (not corrected for weight), is valid for stable  renal function.  GFR  12/10/2020 >60  >60 Final    Comment: Chronic Kidney Disease: less than 60 ml/min/1.73 sq.m. Kidney Failure: less than 15 ml/min/1.73 sq.m. Results valid for patients 18 years and older.       Calcium 12/10/2020 9.6  8.3 - 10.6 mg/dL Final    Total Protein 12/10/2020 6.7  6.4 - 8.2 g/dL Cervical: No cervical adenopathy. Right cervical: No superficial, deep or posterior cervical adenopathy. Left cervical: No superficial, deep or posterior cervical adenopathy. Upper Body:      Right upper body: No supraclavicular or pectoral adenopathy. Left upper body: No supraclavicular or pectoral adenopathy. Skin:     General: Skin is warm and dry. Coloration: Skin is not pale. Findings: No bruising, erythema, laceration, lesion or rash. Neurological:      Mental Status: She is alert and oriented to person, place, and time. Cranial Nerves: No cranial nerve deficit. Sensory: No sensory deficit. Motor: No atrophy or abnormal muscle tone. Coordination: Coordination normal.      Gait: Gait normal.      Deep Tendon Reflexes: Reflexes are normal and symmetric. Psychiatric:         Speech: Speech normal.         Behavior: Behavior normal.         Thought Content: Thought content normal.         Judgment: Judgment normal.       Mrs. Halima Oro has a large  diastasis no hernia found explained the significance and the difference. She has hemosiderin of both legs. From chronic lymphedema or venous reflux  She takes medicine for hyperlipidemia and a stroke was 2 years ago with a normal carotid CTA  ASSESSMENT:    Problem List Items Addressed This Visit     Lymphedema of both lower extremities - Primary    Hyperlipemia    Diastasis recti    Acute ischemic stroke (HCC)          PLAN:  Continue using Lymphapress about 3 hours a day both legs continue wearing stockings when she is up out of bed elevate the legs when she can  Return in six months for a leg and stocking check.      Neil Mcdonald MA am scribing for and in the presence of Jeanine Philippe MD on this date of 01/12/21    I Cristofer Barton MD personally performed the services described in this documentation as scribed by the Medical Assistant Dilcia Lane in my presence and it is both accurate and complete.         Electronically signed by Amado Corona MD on 1/12/2021 at 3:12 PM

## 2021-02-01 ENCOUNTER — HOSPITAL ENCOUNTER (OUTPATIENT)
Dept: WOMENS IMAGING | Age: 76
Discharge: HOME | End: 2021-02-01
Payer: MEDICARE

## 2021-02-01 DIAGNOSIS — Z12.31 BREAST CANCER SCREENING BY MAMMOGRAM: ICD-10-CM

## 2021-02-01 PROCEDURE — 77067 SCR MAMMO BI INCL CAD: CPT

## 2021-02-08 RX ORDER — PRAVASTATIN SODIUM 20 MG
20 TABLET ORAL NIGHTLY
Qty: 90 TABLET | Refills: 3 | Status: SHIPPED | OUTPATIENT
Start: 2021-02-08 | End: 2022-02-07 | Stop reason: SDUPTHER

## 2021-02-12 PROBLEM — Z90.710 H/O: HYSTERECTOMY: Status: RESOLVED | Noted: 2017-04-28 | Resolved: 2021-02-12

## 2021-02-12 PROBLEM — Z80.8 FAMILY HISTORY OF BONE CANCER: Status: ACTIVE | Noted: 2017-04-28

## 2021-02-12 PROBLEM — Z98.890 H/O BLEPHAROPLASTY: Status: ACTIVE | Noted: 2017-04-28

## 2021-02-12 PROBLEM — Z80.8 FH: MELANOMA: Status: ACTIVE | Noted: 2017-04-28

## 2021-02-12 PROBLEM — E05.90 PRETIBIAL MYXEDEMA: Status: ACTIVE | Noted: 2018-04-11

## 2021-02-12 PROBLEM — Z90.49 HX OF CHOLECYSTECTOMY: Status: ACTIVE | Noted: 2017-04-28

## 2021-02-12 PROBLEM — Z71.3 DIETARY COUNSELING AND SURVEILLANCE: Status: ACTIVE | Noted: 2020-07-21

## 2021-02-12 PROBLEM — Z90.710 H/O: HYSTERECTOMY: Status: ACTIVE | Noted: 2017-04-28

## 2021-02-12 PROBLEM — Z86.73 HISTORY OF ISCHEMIC STROKE: Status: ACTIVE | Noted: 2019-10-21

## 2021-02-12 PROBLEM — Z98.890 H/O BLEPHAROPLASTY: Status: RESOLVED | Noted: 2017-04-28 | Resolved: 2021-02-12

## 2021-03-15 ENCOUNTER — HOSPITAL ENCOUNTER (OUTPATIENT)
Dept: MRI IMAGING | Age: 76
Discharge: HOME | End: 2021-03-15
Payer: MEDICARE

## 2021-03-15 ENCOUNTER — HOSPITAL ENCOUNTER (OUTPATIENT)
Dept: VASCULAR LAB | Age: 76
Discharge: HOME | End: 2021-03-15
Payer: MEDICARE

## 2021-03-15 ENCOUNTER — HOSPITAL ENCOUNTER (OUTPATIENT)
Age: 76
Discharge: HOME | End: 2021-03-15
Payer: MEDICARE

## 2021-03-15 DIAGNOSIS — R42 VERTIGO: ICD-10-CM

## 2021-03-15 DIAGNOSIS — M79.662 PAIN OF LEFT CALF: ICD-10-CM

## 2021-03-15 DIAGNOSIS — I73.9 PERIPHERAL VASCULAR DISEASE (HCC): ICD-10-CM

## 2021-03-15 DIAGNOSIS — R42 DIZZINESS: ICD-10-CM

## 2021-03-15 LAB
A/G RATIO: 1.3 (ref 1.1–2.2)
ALBUMIN SERPL-MCNC: 4 G/DL (ref 3.4–5)
ALP BLD-CCNC: 130 U/L (ref 40–129)
ALT SERPL-CCNC: 15 U/L (ref 10–40)
ANION GAP SERPL CALCULATED.3IONS-SCNC: 8 MMOL/L (ref 3–16)
AST SERPL-CCNC: 12 U/L (ref 15–37)
BASOPHILS ABSOLUTE: 0 K/UL (ref 0–0.2)
BASOPHILS RELATIVE PERCENT: 0.6 %
BILIRUB SERPL-MCNC: 0.3 MG/DL (ref 0–1)
BUN BLDV-MCNC: 15 MG/DL (ref 7–20)
CALCIUM SERPL-MCNC: 9.8 MG/DL (ref 8.3–10.6)
CHLORIDE BLD-SCNC: 104 MMOL/L (ref 99–110)
CO2: 32 MMOL/L (ref 21–32)
CREAT SERPL-MCNC: 0.7 MG/DL (ref 0.6–1.2)
EOSINOPHILS ABSOLUTE: 0 K/UL (ref 0–0.6)
EOSINOPHILS RELATIVE PERCENT: 0.5 %
GFR AFRICAN AMERICAN: >60
GFR NON-AFRICAN AMERICAN: >60
GLOBULIN: 3.2 G/DL
GLUCOSE BLD-MCNC: 96 MG/DL (ref 70–99)
HCT VFR BLD CALC: 39.9 % (ref 36–48)
HEMOGLOBIN: 13.5 G/DL (ref 12–16)
LYMPHOCYTES ABSOLUTE: 1.7 K/UL (ref 1–5.1)
LYMPHOCYTES RELATIVE PERCENT: 30.4 %
MCH RBC QN AUTO: 29.9 PG (ref 26–34)
MCHC RBC AUTO-ENTMCNC: 33.7 G/DL (ref 31–36)
MCV RBC AUTO: 88.5 FL (ref 80–100)
MONOCYTES ABSOLUTE: 0.5 K/UL (ref 0–1.3)
MONOCYTES RELATIVE PERCENT: 8.7 %
NEUTROPHILS ABSOLUTE: 3.4 K/UL (ref 1.7–7.7)
NEUTROPHILS RELATIVE PERCENT: 59.8 %
PDW BLD-RTO: 12.7 % (ref 12.4–15.4)
PLATELET # BLD: 208 K/UL (ref 135–450)
PMV BLD AUTO: 7.7 FL (ref 5–10.5)
POTASSIUM SERPL-SCNC: 4 MMOL/L (ref 3.5–5.1)
RBC # BLD: 4.51 M/UL (ref 4–5.2)
SODIUM BLD-SCNC: 144 MMOL/L (ref 136–145)
TOTAL PROTEIN: 7.2 G/DL (ref 6.4–8.2)
WBC # BLD: 5.7 K/UL (ref 4–11)

## 2021-03-15 PROCEDURE — 70551 MRI BRAIN STEM W/O DYE: CPT

## 2021-03-15 PROCEDURE — 36415 COLL VENOUS BLD VENIPUNCTURE: CPT

## 2021-03-15 PROCEDURE — 85025 COMPLETE CBC W/AUTO DIFF WBC: CPT

## 2021-03-15 PROCEDURE — 93970 EXTREMITY STUDY: CPT

## 2021-03-15 PROCEDURE — 80053 COMPREHEN METABOLIC PANEL: CPT

## 2021-03-18 PROBLEM — E66.01 MORBID OBESITY DUE TO EXCESS CALORIES (HCC): Status: RESOLVED | Noted: 2018-04-20 | Resolved: 2021-03-18

## 2021-03-18 PROBLEM — Z80.8 FAMILY HISTORY OF BONE CANCER: Status: RESOLVED | Noted: 2017-04-28 | Resolved: 2021-03-18

## 2021-03-18 PROBLEM — I63.9 ACUTE ISCHEMIC STROKE (HCC): Status: RESOLVED | Noted: 2019-10-21 | Resolved: 2021-03-18

## 2021-03-18 PROBLEM — Z90.49 HX OF CHOLECYSTECTOMY: Status: RESOLVED | Noted: 2017-04-28 | Resolved: 2021-03-18

## 2021-03-18 PROBLEM — M62.08 DIASTASIS RECTI: Status: RESOLVED | Noted: 2021-01-12 | Resolved: 2021-03-18

## 2021-03-18 PROBLEM — Z80.8 FH: MELANOMA: Status: RESOLVED | Noted: 2017-04-28 | Resolved: 2021-03-18

## 2021-03-18 PROBLEM — Z71.3 DIETARY COUNSELING AND SURVEILLANCE: Status: RESOLVED | Noted: 2020-07-21 | Resolved: 2021-03-18

## 2021-03-19 ENCOUNTER — PROCEDURE VISIT (OUTPATIENT)
Dept: AUDIOLOGY | Age: 76
End: 2021-03-19
Payer: MEDICARE

## 2021-03-19 ENCOUNTER — OFFICE VISIT (OUTPATIENT)
Dept: ENT CLINIC | Age: 76
End: 2021-03-19
Payer: MEDICARE

## 2021-03-19 VITALS
SYSTOLIC BLOOD PRESSURE: 131 MMHG | DIASTOLIC BLOOD PRESSURE: 76 MMHG | BODY MASS INDEX: 41.8 KG/M2 | TEMPERATURE: 97.7 F | HEART RATE: 80 BPM | WEIGHT: 221.2 LBS

## 2021-03-19 DIAGNOSIS — J30.9 ALLERGIC RHINITIS, UNSPECIFIED SEASONALITY, UNSPECIFIED TRIGGER: ICD-10-CM

## 2021-03-19 DIAGNOSIS — H61.21 IMPACTED CERUMEN OF RIGHT EAR: ICD-10-CM

## 2021-03-19 DIAGNOSIS — H90.3 SENSORINEURAL HEARING LOSS (SNHL) OF BOTH EARS: Primary | ICD-10-CM

## 2021-03-19 DIAGNOSIS — H81.13 BENIGN PAROXYSMAL POSITIONAL VERTIGO DUE TO BILATERAL VESTIBULAR DISORDER: Primary | ICD-10-CM

## 2021-03-19 DIAGNOSIS — R42 VERTIGO: ICD-10-CM

## 2021-03-19 DIAGNOSIS — H90.3 SENSORINEURAL HEARING LOSS (SNHL) OF BOTH EARS: ICD-10-CM

## 2021-03-19 PROCEDURE — G8484 FLU IMMUNIZE NO ADMIN: HCPCS | Performed by: STUDENT IN AN ORGANIZED HEALTH CARE EDUCATION/TRAINING PROGRAM

## 2021-03-19 PROCEDURE — 1123F ACP DISCUSS/DSCN MKR DOCD: CPT | Performed by: STUDENT IN AN ORGANIZED HEALTH CARE EDUCATION/TRAINING PROGRAM

## 2021-03-19 PROCEDURE — 95992 CANALITH REPOSITIONING PROC: CPT | Performed by: STUDENT IN AN ORGANIZED HEALTH CARE EDUCATION/TRAINING PROGRAM

## 2021-03-19 PROCEDURE — 92567 TYMPANOMETRY: CPT | Performed by: AUDIOLOGIST

## 2021-03-19 PROCEDURE — 99204 OFFICE O/P NEW MOD 45 MIN: CPT | Performed by: STUDENT IN AN ORGANIZED HEALTH CARE EDUCATION/TRAINING PROGRAM

## 2021-03-19 PROCEDURE — 69210 REMOVE IMPACTED EAR WAX UNI: CPT | Performed by: STUDENT IN AN ORGANIZED HEALTH CARE EDUCATION/TRAINING PROGRAM

## 2021-03-19 PROCEDURE — 1090F PRES/ABSN URINE INCON ASSESS: CPT | Performed by: STUDENT IN AN ORGANIZED HEALTH CARE EDUCATION/TRAINING PROGRAM

## 2021-03-19 PROCEDURE — G8399 PT W/DXA RESULTS DOCUMENT: HCPCS | Performed by: STUDENT IN AN ORGANIZED HEALTH CARE EDUCATION/TRAINING PROGRAM

## 2021-03-19 PROCEDURE — 1036F TOBACCO NON-USER: CPT | Performed by: STUDENT IN AN ORGANIZED HEALTH CARE EDUCATION/TRAINING PROGRAM

## 2021-03-19 PROCEDURE — 92557 COMPREHENSIVE HEARING TEST: CPT | Performed by: AUDIOLOGIST

## 2021-03-19 PROCEDURE — 4040F PNEUMOC VAC/ADMIN/RCVD: CPT | Performed by: STUDENT IN AN ORGANIZED HEALTH CARE EDUCATION/TRAINING PROGRAM

## 2021-03-19 PROCEDURE — G8427 DOCREV CUR MEDS BY ELIG CLIN: HCPCS | Performed by: STUDENT IN AN ORGANIZED HEALTH CARE EDUCATION/TRAINING PROGRAM

## 2021-03-19 PROCEDURE — G8417 CALC BMI ABV UP PARAM F/U: HCPCS | Performed by: STUDENT IN AN ORGANIZED HEALTH CARE EDUCATION/TRAINING PROGRAM

## 2021-03-19 NOTE — PROGRESS NOTES
[address] NECK SURGERY  NEW PATIENT HISTORY AND PHYSICAL NOTE      Patient Name: Bunny Taylor  Medical Record Number:  <[id number]>  Primary Care Physician:  Belinda Mcdermott MD    ChiefComplaint     Chief Complaint   Patient presents with    Dizziness    Ear Problem     excess ear wax        History of Present Illness     Bunny Taylor is an 67 y.o. female presenting with vertigo. Room spinning vertigo that started 11 days ago. Fall from standing 4 days prior to this, barley hit head, felt some vertigo right after. Woke up in the middle of the night with spinning sensation. Lasted for couple days, told to take Dramamine by pharmacy which helped somewhat. No longer taking dramamine. Symptoms have improved significantly. Now with spinning sensation only when she gets up from bed and makes sudden head movements, more to when she turns head left. Right ear has felt full, she tried a home ear cleaning kit. denies recent changes in hearing or tinnitus. No otalgia. No otorrhea. No history of chronic ear infections. No history of otologic surgery. No family history of early onset hearing loss. No loud noise exposures. Denies exposure to chemotherapy or long-term IV antibiotics. No recent illnesses or sick contacts. Her PCP gave her a prescription for meclizine which she has yet to fill.    + environmental allergies, controlled with daily claritin.  Used to take flonase in the past.     Past Medical History     Past Medical History:   Diagnosis Date    Asthma     Diverticulitis     Graves disease     Hyperlipidemia     Hypertension     Hyperthyroidism     Hypothyroidism     Mild persistent asthma without complication     Osteoarthritis     Peptic ulcer, unspecified site, unspecified as acute or chronic, without mention of hemorrhage or perforation     Prolonged emergence from general anesthesia     Sleep apnea     CPAP at night    Venous insufficiency     Wears glasses        Past Surgical History     Past Surgical History:   Procedure Laterality Date    BLEPHAROPLASTY Left     CHOLECYSTECTOMY      COLONOSCOPY      normal-daniel    FOOT SURGERY Left     spurs removed from left heel    HYSTERECTOMY      KNEE ARTHROSCOPY Left     ROTATOR CUFF REPAIR Right        Family History     Family History   Problem Relation Age of Onset    Hypertension Mother     Colon Cancer Father     Diabetes Sister        Social History     Social History     Tobacco Use    Smoking status: Former Smoker     Packs/day: 0.25     Years: 3.00     Pack years: 0.75     Types: Cigarettes     Start date: 10/21/1961     Quit date: 10/21/1964     Years since quittin.3    Smokeless tobacco: Never Used   Substance Use Topics    Alcohol use: Yes     Comment: SOCIALLY-wine    Drug use: No        Allergies     Allergies   Allergen Reactions    Montelukast Sodium Shortness Of Breath    Sulfa Antibiotics Shortness Of Breath    Strawberry Extract Rash       Medications     Current Outpatient Medications   Medication Sig Dispense Refill    meclizine (ANTIVERT) 12.5 MG tablet Take 1 tablet by mouth 3 times daily as needed for Dizziness 15 tablet 1    telmisartan-hydrochlorothiazide (MICARDIS HCT) 80-25 MG per tablet TAKE 1 TABLET BY MOUTH DAILY 90 tablet 3    loratadine (CLARITIN) 10 MG tablet Take 10 mg by mouth daily      pravastatin (PRAVACHOL) 20 MG tablet Take 1 tablet by mouth nightly 90 tablet 3    amLODIPine (NORVASC) 2.5 MG tablet TAKE 1 TABLET BY MOUTH DAILY 30 tablet 3    nebivolol (BYSTOLIC) 10 MG tablet TAKE 1 TABLET DAILY 90 tablet 3    dicyclomine (BENTYL) 20 MG tablet Take 1 tablet by mouth every 6 hours 120 tablet 1    levothyroxine (SYNTHROID) 125 MCG tablet TAKE 1 TABLET BY MOUTH DAILY 90 tablet 3    Multiple Vitamins-Minerals (THERAPEUTIC MULTIVITAMIN-MINERALS) tablet Take 1 tablet by mouth daily      ezetimibe (ZETIA) 10 MG tablet TAKE 1 TABLET DAILY 90 tablet 3    vitamin C (ASCORBIC ACID) 500 MG tablet Take 500 mg by mouth daily      Cholecalciferol (VITAMIN D3) 125 MCG (5000 UT) TABS Take 50 each by mouth      MAGNESIUM PO Take by mouth      mometasone-formoterol (DULERA) 200-5 MCG/ACT inhaler INHALE 2 PUFFS INTO THE LUNGS TWICE DAILY (Patient taking differently: Only using once or twice weekly) 1 Inhaler 3    PROAIR  (90 Base) MCG/ACT inhaler USE 2 INHALATIONS ORALLY   INTO THE LUNGS EVERY 6     HOURS AS NEEDEDFOR        WHEEZING 8.5 g 4    cyanocobalamin ([employer]S VITAMIN B12) 1000 MCG tablet Take 1 tablet by mouth daily 30 tablet 3    aspirin 81 MG tablet Take 81 mg by mouth daily. No current facility-administered medications for this visit. Review of Systems     REVIEW OF SYSTEMS  The following systems were reviewed and revealed the following in addition to any already discussed in the HPI:    CONSTITUTIONAL: no weight loss, no fever, no night sweats, no chills  EYES: no vision changes, no blurry vision  EARS: no changes in hearing, no otalgia  NOSE: no epistaxis, no rhinorrhea  THROAT: No voice changes, no sore throat, no dysphagia      PhysicalExam     Vitals:    03/19/21 0847   BP: 131/76   Pulse: 80   Temp: 97.7 °F (36.5 °C)   TempSrc: Temporal   Weight: 221 lb 3.2 oz (100.3 kg)       PHYSICAL EXAM  /76   Pulse 80   Temp 97.7 °F (36.5 °C) (Temporal)   Wt 221 lb 3.2 oz (100.3 kg)   BMI 41.80 kg/m²     GENERAL: No acute distress, alert and oriented, no hoarseness  EYES: EOMI, Anti-icteric  NOSE: On anterior rhinoscopy there is no epistaxis, nasal mucosa moist and normal appearing, no purulent drainage. EARS: Normal external appearance; on portable otomicroscopy:     -Ad: External auditory canal with cerumen impaction. See procedure note below.     -As: External auditory canal without stenosis, tympanic membrane clear, no middle ear effusions or retractions.    FACE: HB 1/6 bilaterally, symmetric appearing, sensation Fatigable torsional geotropic nystagmus was noted as well as subjective vertigo. After approximately 1 minute, after allowing for nystagmus and vertigo to subside, the patient's head was then rotated towards the opposite side while maintaining neck support. Another minute was allowed to elapse and then the patient's body was placed in the right recumbent position. Another minute was allowed to elapse and the patient was then placed in the seated position slowly with their legs overlying the examination table, maintaining this position for the next 2 minutes. The patient tolerated the procedure well without complication. Post-procedural instructions were given. Data/Imaging Review     Comprehensive audiological evaluation performed in the office today by Elma CHRISTIAN was independently reviewed by myself which demonstrates: Au: Normal to moderate SNHL, symmetric    WRS 93% right, WRS 93% left. Type A tympanogram left. Type A tympanogram right. Assessment and Plan     1. Benign paroxysmal positional vertigo due to bilateral vestibular disorder  2. Vertigo  Her vertigo may be multifactorial.  There is definitely a BPPV component given her findings on examination today. Given the duration of symptoms from her prior episode she may have had a bout of vestibular neuronitis which is since resolved. Given the worsening of nystagmus and subjective vertigo when turning to the left, left-sided canalith repositioning therapy was performed. Post procedural instructions given. Referral for vestibular therapy placed    3. Impacted cerumen of right ear  - Cerumen debrided in the office today. - Avoid Q-tip and self instrumentation of ears  - Hydrogen peroxide or Debrox (OTC) drops as needed or once every other week to help break up and soften wax  - Follow-up as needed for repeat debridement    4. Allergic rhinitis, unspecified seasonality, unspecified trigger  Continue loratadine    5.   Sensorineural hearing loss of both ears  -She does not feel like her hearing is to the point where she needs hearing aids. - Encouraged loud noise avoidance and wearing of hearing protection when exposed. - Recommend surveillance of hearing with yearly audiogram.      Follow Up     Return if symptoms worsen or fail to improve. Dr. [place] [address]  Department of Otolaryngology/Head & Neck Surgery  3/19/21    Medical Decision Making: The following items were considered in medical decision making:  Independent review of images  Review / order clinical lab tests  Review / order radiology tests  Decision to obtain old records    Portions of this note were dictated using Dragon.  There may be linguistic errors secondary to the use of this program.

## 2021-03-19 NOTE — PATIENT INSTRUCTIONS
Canalith Repositioning (CRP) - \"Epley\" Maneuvers:            Post-Procedure Instructions:  -Keep chin straight forward to tipped down a bit  -Do not tip head back to drink water but use a straw  -Restrictions apply for the first 24 hours after the treatment. When reclining for sleep, sleep as near upright as possible.  Ideally, the head should not tip backwards more than 30 degrees which means that the back cannot be tilted backwards more than 30 degrees.  -There is no limit to how many times the CRP (canalith repositioning procedures) can be re-done.  -If neurological symptoms (for example, weakness, numbness, visual changes other than vertigo) occur with the Epley maneuver please notify the clinic immediately

## 2021-03-19 NOTE — PROGRESS NOTES
[facility]  Division of Audiology/Otolaryngology    3/19/2021     PCP: Zayda Mauricio MD   MRN: <[id number]>     PERTINENT MEDICAL HISTORY    Ms. Cezar Kathleen was seen for hearing and middle ear evaluation. Otolaryngology is referral source of today's appointment. Patient presents with dizziness of which she describes as movement induced. She reports a stroke in 2019. No other ear related problems reported. AUDIOGRAM/IMMITTANCE    Audiogram demonstrates mild-moderate sensory loss of both ears. Speech discrimination ability was excellent in quiet, at elevated levels. Immittance consistent with normal middle ear function (Type A curve) bilaterally. Ipsilateral acoustic reflexes present at some frequencies, bilaterally. COUNSELING      Audiogram results were reviewed with patient.             RECOMMENDATION     ENT to advise    Electronically signed by Kesha Peterson on 3/19/21 at 9:18 AM.

## 2021-03-23 ENCOUNTER — HOSPITAL ENCOUNTER (OUTPATIENT)
Dept: PHYSICAL THERAPY | Age: 76
Setting detail: THERAPIES SERIES
Discharge: HOME | End: 2021-03-23
Payer: MEDICARE

## 2021-03-23 PROCEDURE — 97161 PT EVAL LOW COMPLEX 20 MIN: CPT

## 2021-03-23 PROCEDURE — 97112 NEUROMUSCULAR REEDUCATION: CPT

## 2021-03-23 PROCEDURE — 97530 THERAPEUTIC ACTIVITIES: CPT

## 2021-03-23 NOTE — FLOWSHEET NOTE
[address] [facility], [place]  [address] [place] [facility]  Phone: [phone]   Fax:     [phone]      Physical Therapy Treatment Note/ Progress Report:   Date:  3/23/2021    Patient Name:  Irving Barr    :  1945  MRN: [id number]    Pertinent Medical History:  asthma, graves disease, HTN, OA, diverticulitis, R RTC repair,    Medical/Treatment Diagnosis Information:  · Diagnosis: B BPPV  · Treatment Diagnosis: Vestibular impairment    Insurance/Certification information:  PT Insurance Information: Medicare  Physician Information:  Referring Practitioner: Robynn Gottron, DO  Plan of care signed (Y/N): []  Yes [x]  No     Date of Patient follow up with Physician: VIVI     Progress Report: []  Yes  [x]  No     Date Range for reporting period:  Beginning: 3/23/2021  Ending:     Progress report due (10 Rx/or 30 days whichever is less):      Recertification due (POC duration/ or 90 days whichever is less):  21    Visit # Insurance Allowable Auth required?  BOMN []  Yes [x]  No     Latex Allergy:  [x]NO      []YES  Preferred Language for Healthcare:   [x]English       []other:    Functional Outcomes Measure:   Date Assessed: at eval  Test: [facility]  Score: 46    Pain level:  0/10  Dizziness level: 2/10     History of Injury:Patient reports onset of dizziness around 3/7/21. Patient states that she was sleeping and woke up to go to the bathroom. Upon waking the whole room was spinning and she could barely get out of the bed. Notes the spinning lasted secs and then she was dizzy with blurred vision for awhile afterwards. Patient notes she saw ENT on Friday who did some treatment to help her in his office. Prior to seeing ENT she continued to have dizziness when rolling in bed, sitting up, and turning head. After seeing ENT on Friday, patient notes dizziness is better but still slightly present. Notices the spinning when rising from laying down, bending over, and when shaking head. SUBJECTIVE:  See eval    OBJECTIVE:   Postural Control Tests:  Clinical Test of Sensory Interaction for Balance (CTSIB) performed in Romberg stance  CONDITION TIME STRATEGY SWAY    Eyes open, firm surface x30 sec   none    Eyes closed, firm surface x30 sec ankle mild    Eyes open, foam surface x30 sec Hip  mild    Eyes closed, foam surface x10 sec step moderate      Gait: No major gait deviations noted                Oculomotor/Vestibular Examination:     Spontaneous nystagmus:       []? Left             []? Right           [x]? Absent  Gaze-Evoked nystagmus with fixation present:              Primary            []? Present       [x]? Absent              Right                []? Present       [x]? Absent              Left                  []? Present       [x]? Absent  VOR Head Thrust Test:          [x]? Normal       []? Abnormal    Comments:   VOR Cancellation:                  [x]? Normal       []? Abnormal    Comments:   Smooth Pursuit:                      [x]? Normal       []? Abnormal    Comments:   Saccades:                               [x]? Normal       []? Abnormal    Comments:   Convergence:                          [x]? Normal       []? Abnormal    Comments:      Positional Testing  R Hallpike-Jet maneuver:               Nystagmus:     [x]? Yes             []? No               [x]? Duration: 45 sec                                          [x]? Direction: down beating torsional nystagmus              Vertigo:            [x]? Yes             []? No               []? Duration:   L Hallpike-[place] maneuver:              Nystagmus:     [x]? Yes             []? No               [x]? Duration: 45-60 sec                                       [x]? Direction: down beating torsional nystagmus, worse on the L>R              Vertigo:            [x]?  Yes             []? No               []? Duration:   Supine roll head right: Nystagmus:     []? Yes             [x]? No               []? Duration:                                          []? Direction:                  Vertigo:            []? Yes             [x]? No               []? Duration:   Supine roll head left:              Nystagmus:     []? Yes             [x]? No               []? Duration:                                          []? Direction:                  Vertigo:            []? Yes             [x]? No               []? Duration:      RESTRICTIONS/PRECAUTIONS:     Exercises/Interventions:     Therapeutic Exercises (43337) Min: Resistance/Reps Notes/Cues                            Therapeutic Activities (55793) Min:10     Education on BPPV effects on ADLs, gait and transfers  x10 mins                        Neuromuscular Re-ed (16592) Min:15     Canalith Repositioning Procedure 2x for L anterior canalithiasis - treated L side today only due to this being the side with more severe symptoms Decreased nystagmus and vertigo noted after 1st maneuver. Patient able to stand and walk after 2nd maneuver without dizziness             Manual Intervention ([id number]) Min:                Modalities  Min:                      Other Therapeutic Activities: Pt was educated x10 mins on PT POC, Diagnosis, Prognosis, pathomechanics as well as frequency and duration of scheduling future physical therapy appointments. Time was also taken on this day to answer all patient questions and participation in PT. Reviewed appointment policy in detail with patient and patient verbalized understanding. Home Exercise Program: Patient was instructed in the following for HEP: self-epley maneuver for L side. Patient verbalized/demonstrated understanding and was issued written handout.     Therapeutic Exercise and NMR EXR  [] (55734) Provided verbal/tactile cueing for activities related to strengthening, flexibility, endurance, ROM for improvements in LE, proximal hip, and core control with self care, mobility, lifting, ambulation. [x] (09076) Provided verbal/tactile cueing for activities related to improving balance, coordination, kinesthetic sense, posture, motor skill, proprioception  to assist with LE, proximal hip, and core control in self care, mobility, lifting, ambulation and eccentric single leg control. [address] and Therapeutic Activities:    [x] (13919 or 04415) Provided verbal/tactile cueing for activities related to improving balance, coordination, kinesthetic sense, posture, motor skill, proprioception and motor activation to allow for proper function of core, proximal hip and LE with self care and ADLs and functional mobility.   [] (35489) Gait Re-education- Provided training and instruction to the patient for proper LE, core and proximal hip recruitment and positioning and eccentric body weight control with ambulation re-education including up and down stairs     Home Exercise Program:    [] (68655) Reviewed/Progressed HEP activities related to strengthening, flexibility, endurance, ROM of core, proximal hip and LE for functional self-care, mobility, lifting and ambulation/stair navigation   [x] (07943)Reviewed/Progressed HEP activities related to improving balance, coordination, kinesthetic sense, posture, motor skill, proprioception of core, proximal hip and LE for self care, mobility, lifting, and ambulation/stair navigation      Manual Treatments:  PROM / STM / Oscillations-Mobs:  G-I, II, III, IV (PA's, Inf., Post.)  [] (04546) Provided manual therapy to mobilize LE, proximal hip and/or LS spine soft tissue/joints for the purpose of modulating pain, promoting relaxation,  increasing ROM, reducing/eliminating soft tissue swelling/inflammation/restriction, improving soft tissue extensibility and allowing for proper ROM for normal function with self care, mobility, lifting and ambulation.      Charges:  Timed Code Treatment Minutes: 35   Total Treatment Minutes: 50      [x] EVAL (LOW) 04346 (typically 20 minutes face-to-face)  [] EVAL (MOD) 69404 (typically 30 minutes face-to-face)  [] EVAL (HIGH) 40357 (typically 45 minutes face-to-face)  [] RE-EVAL     [] CH(22589) x     [] Dry needle 1 or 2 Muscles (96995)  [x] NMR (33538) x     [] Dry needle 3+ Muscles (88639)  [] Manual (79982) x     [] Ultrasound (70081) x  [x] TA (39750) x     [] Mech Traction (47246)  [] ES(attended) (42082)     [] ES (un) (37610):   [] Vasopump (50581) [] Ionto (58236)   [] Other:    GOALS:  Patient stated goal: \"no dizziness\"  []? Progressing: []? Met: []? Not Met: []? Adjusted     Therapist goals for Patient:   Short Term Goals: To be achieved in: 2 weeks  1. Independent in HEP and progression per patient tolerance, in order to prevent re-injury. []? Progressing: []? Met: []? Not Met: []? Adjusted  2. Patient will have a decrease in dizziness/imbalance/symptoms by 40% to facilitate improvement in movement, function, balance and ADLs as indicated by Functional Deficits. []? Progressing: []? Met: []? Not Met: []? Adjusted     Long Term Goals: To be achieved in: at discharge  1. Disability index score of 25% or less for the [address] [place] [address] to assist with reaching prior level of function. []? Progressing: []? Met: []? Not Met: []? Adjusted  2. Patient will demonstrate negative oculomotor special testing/positional testing as indicated by patients Functional Deficits. []? Progressing: []? Met: []? Not Met: []? Adjusted  3. Patient will return to functional activities including rolling over in bed without increased symptoms or restriction. []? Progressing: []? Met: []? Not Met: []? Adjusted  5. Patient will be able to ambulate in community with straight cane without increased symptoms or restriction. []? Progressing: []? Met: []? Not Met: []?  Adjusted         ASSESSMENT:  See eval    Treatment/Activity Tolerance:  [x] Patient tolerated treatment well [] Patient limited by fatique  [] Patient limited by pain  [] Patient limited by other medical complications  [] Other:     Overall Progression Towards Functional goals/ Treatment Progress Update:  [] Patient is progressing as expected towards functional goals listed. [] Progression is slowed due to complexities/Impairments listed. [] Progression has been slowed due to co-morbidities. [x] Plan just implemented, too soon to assess goals progression <30days   [] Goals require adjustment due to lack of progress  [] Patient is not progressing as expected and requires additional follow up with physician  [] Other    Prognosis for POC: [x] Good [] Fair  [] Poor    Patient requires continued skilled intervention: [x] Yes  [] No        PLAN:   [] Continue per plan of care [] Alter current plan (see comments)  [x] Plan of care initiated [] Hold pending MD visit [] Discharge    Electronically signed by: Sandra Snyder PT , OMT-C,  [id number]      Note: If patient does not return for scheduled/recommended follow up visits, this note will serve as a discharge from care along with the most recent update on progress.

## 2021-03-23 NOTE — PLAN OF CARE
[address] [facility]  [address] [facility]  Phone: [phone]   Fax:     [phone]                                                       Physical Therapy Certification    Dear Referring Practitioner: Curly Espinoza DO,    We had the pleasure of evaluating the following patient for physical therapy services at [address]. A summary of our findings can be found in the initial assessment below. This includes our plan of care. If you have any questions or concerns regarding these findings, please do not hesitate to contact me at the office phone number checked above. Thank you for the referral.       Physician Signature:_______________________________Date:__________________  By signing above (or electronic signature), therapists plan is approved by physician          Patient: Cathryne Sandhoff   : 1945   MRN: [id number]  Referring Physician: Referring Practitioner: Curly Espinoza DO      Evaluation Date: 3/23/2021        Medical Diagnosis Information:  Diagnosis: B BPPV   Treatment Diagnosis: Vestibular impairment                                           Insurance information: PT Insurance Information: Medicare     Precautions/ Contra-indications: none  Latex Allergy:  [x]NO      []YES  Preferred Language for Healthcare:   [x]English       []other:    C-SSRS Triggered by Intake questionnaire (Past 2 wk assessment ):   [x] No, Questionnaire did not trigger screening.   [] Yes, Patient intake triggered C-SSRS Screening      [] C-SSRS Screening completed  [] PCP notified via Epic     SUBJECTIVE: Patient stated complaint:  Patient reports onset of dizziness around 3/7/21. Patient states that she was sleeping and woke up to go to the bathroom. Upon waking the whole room was spinning and she could barely get out of the bed.   Notes the spinning lasted secs and then she was dizzy with blurred vision for awhile complaints: pt denied  Cervical Pain: 0/10  Cervical spine ROM:  [x]  WNL [] Impaired:    LE ROM: B=WNL    LE Strength: B=5/5    Somatosensory:  Light touch:  [x] Normal [] Impaired Comments:    Coordination:  Rapid alternating movements: [x] Normal [] Dysdiadokinesia   Finger to Nose:   [x] Normal [] Dysmetric  Heel to Shin:    [x] Normal [] Ataxic    Postural Control Tests:  Clinical Test of Sensory Interaction for Balance (CTSIB) performed in Romberg stance  CONDITION TIME STRATEGY SWAY    Eyes open, firm surface x30 sec  none    Eyes closed, firm surface x30 sec ankle mild    Eyes open, foam surface x30 sec Hip  mild    Eyes closed, foam surface x10 sec step moderate     Gait: No major gait deviations noted     Oculomotor/Vestibular Examination:    Spontaneous nystagmus:  [] Left  [] Right [x] Absent  Gaze-Evoked nystagmus with fixation present:   Primary [] Present [x] Absent   Right  [] Present [x] Absent   Left  [] Present [x] Absent  VOR Head Thrust Test: [x] Normal [] Abnormal  Comments:   VOR Cancellation:  [x] Normal [] Abnormal Comments:   Smooth Pursuit:  [x] Normal [] Abnormal Comments:   Saccades:   [x] Normal [] Abnormal Comments:   Convergence:   [x] Normal [] Abnormal Comments:     Positional Testing  R Hallpike-[place] maneuver:    Nystagmus:  [x] Yes  [] No  [x] Duration: 45 sec       [x] Direction: down beating torsional nystagmus   Vertigo:  [x] Yes  [] No  [] Duration:   L Hallpike-[place] maneuver:   Nystagmus:  [x] Yes  [] No  [x] Duration: 45-60 sec      [x] Direction: down beating torsional nystagmus, worse on the L>R   Vertigo:  [x] Yes  [] No  [] Duration:   Supine roll head right:   Nystagmus:  [] Yes  [x] No  [] Duration:       [] Direction:    Vertigo:  [] Yes  [x] No  [] Duration:   Supine roll head left:   Nystagmus:  [] Yes  [x] No  [] Duration:       [] Direction:    Vertigo:  [] Yes  [x] No  [] Duration:     Outcome Measures  Dizziness Handicap Index ([address] [place] [address]) 46       [x] Patient history, Vestibular Hypofunction [] Bilateral Vestibular Hypofunction   [x] Gait Instability    [x] Decreased tolerance for ADLs    [] Decreased functional strength   [x] Reduced Balance/Proprioceptive control   [] Reduced ability to hear/focus   [] Noted cervical/thoracic/GHJ joint hypomobility   [] Noted cervical/thoracic/GHJ joint hypermobility   [] Decreased cervical/UE functional ROM   [] Noted Headache pain aggravated by neck movements with/without dizziness   [] Abnormal reflexes/sensation/myotomal/dermatomal deficits   [] Decreased DCF control or ability to hold head up   [] Decreased RC/scapular/core strength and neuromuscular control     Functional Activity Limitations (from functional questionnaire and intake)   []Reduced ability to tolerate prolonged functional positions   [x]Reduced ability or difficulty with changes of positions or transfers between positions  [x]Reduced ability to transfer in/out of bed or rolling in bed   []Reduced ability or tolerance with driving, reading and/or computer work   []Reduced ability to perform lifting, reaching, carrying tasks   [x]Reduced ability to forward bend   []Reduced ability to ambulate prolonged functional periods/distances/surfaces   []Reduced ability to ascend/descend stairs  [x]Reduced ability to concentrate/focus  [x]Reduced ability to turn/pitch head rapidly  [x]Reduced ability to self-correct for losses of balance []other:       Participation Restrictions   [x]Reduced participation in self-care activities   [x]Reduced participation in home management activities   []Reduced participation in work activities   [x]Reduced participation in social activities   []Reduced participation in sport/recreational activities    Classification:   [x]Signs/symptoms consistent with BPPV (benign paroxysmal positional vertigo)      []Signs/symptoms consistent with unilateral peripheral vestibulopathy (i.e., vestibular neuritis, labyrinthitis, acoustic neuroma)   []Signs/symptoms consistent with central vestibulopathy  []Signs/symptoms consistent with migraine-related vestibulopathy  []Signs/symptoms consistent with Meniere's disease / post-traumatic hydrops  []Signs/symptoms consistent with perilymphatic fistula   []Signs/symptoms consistent with cervicogenic dizziness  []Signs/symptoms consistent with gait instability   []Signs/symptoms consistent with motion sensitivity    []signs/symptoms consistent with neck pain with mobility deficits     []signs/symptoms consistent with neck pain with movement coordinated impairments    []signs/symptoms consistent with neck pain with radiating pain    []signs/symptoms consistent with neck pain with headaches (cervicogenic)    []Signs/symptoms consistent with nerve root involvement including myotome & dermatome dysfunction   []sign/symptoms consistent with facet dysfunction of cervical and thoracic spine    []signs/symptoms consistent suggesting central cord compression/UMN syndromes   []signs/symptoms consistent with discogenic cervical pain   []signs/symptoms consistent with rib dysfunction   []signs/symptoms consistent with postural dysfunction   []signs/symptoms consistent with shoulder pathology    []signs/symptoms consistent with post-surgical status including decreased ROM, strength and function.    []signs/symptoms consistent with pathology which may benefit from Dry Needling  []signs/symptoms which may limit the use of advanced manual therapy techniques: (Elevated CV risk profile, recent trauma, intolerance to end range positions, prior TIA, visual issues, UE neurological compromise)   []other:      Prognosis/Rehab Potential:      []Excellent   [x]Good    []Fair   []Poor    Tolerance of evaluation/treatment:    []Excellent   [x]Good    []Fair   []Poor     Physical Therapy Evaluation Complexity Justification  [x] A history of present problem with:  [] no personal factors and/or comorbidities that impact the plan of care;  [x]1-2 personal factors and/or comorbidities that impact the plan of care  []3 personal factors and/or comorbidities that impact the plan of care  [x] An examination of body systems using standardized tests and measures addressing any of the following: body structures and functions (impairments), activity limitations, and/or participation restrictions;:  [x] a total of 1-2 or more elements   [] a total of 3 or more elements   [] a total of 4 or more elements   [x] A clinical presentation with:  [x] stable and/or uncomplicated characteristics   [] evolving clinical presentation with changing characteristics  [] unstable and unpredictable characteristics;   [x] Clinical decision making of [x] low, [] moderate, [] high complexity using standardized patient assessment instrument and/or measurable assessment of functional outcome. [x] EVAL (LOW) 97623 (typically 15 minutes face-to-face)  [] EVAL (MOD) 14832 (typically 30 minutes face-to-face)  [] EVAL (HIGH) 59989 (typically 45 minutes face-to-face)  [] RE-EVAL     HEP instruction: Written HEP instructions provided and reviewed. GOALS:  Patient stated goal: \"no dizziness\"  [] Progressing: [] Met: [] Not Met: [] Adjusted    Therapist goals for Patient:   Short Term Goals: To be achieved in: 2 weeks  1. Independent in HEP and progression per patient tolerance, in order to prevent re-injury. [] Progressing: [] Met: [] Not Met: [] Adjusted  2. Patient will have a decrease in dizziness/imbalance/symptoms by 40% to facilitate improvement in movement, function, balance and ADLs as indicated by Functional Deficits. [] Progressing: [] Met: [] Not Met: [] Adjusted    Long Term Goals: To be achieved in: at discharge  1. Disability index score of 25% or less for the [facility] to assist with reaching prior level of function. [] Progressing: [] Met: [] Not Met: [] Adjusted  2.  Patient will demonstrate negative oculomotor special testing/positional testing as indicated by patients Functional Deficits. [] Progressing: [] Met: [] Not Met: [] Adjusted  3. Patient will return to functional activities including rolling over in bed without increased symptoms or restriction. [] Progressing: [] Met: [] Not Met: [] Adjusted  5. Patient will be able to ambulate in community with straight cane without increased symptoms or restriction. [] Progressing: [] Met: [] Not Met: [] Adjusted     PLAN:   Today's Treatment:    [x] See flowsheet   [x] Patient treated with canalith repositioning maneuver   [] Education materials provided on BPPV/Vestibular Dysfunction/Habituation   [] Precautions provided and patient to follow precautions for next 24 hours in regards to BPPV management   [x] Written home exercise instructions   [] Other:    Frequency/Duration:  1-2 days per week for 4 Weeks:  Interventions:  [x]  Therapeutic exercise including: strength training, ROM, for LEs, cervical spine & UEs  [x]  NMR activation and proprioception for BLEs, vestibular training/habituation, balance, coordination  [x]  Manual therapy as indicated via: canalith repositioning maneuvers, Dry Needling/IASTM, STM, PROM, Gr I-IV mobilizations, manipulation. [x]  Modalities as needed that may include: thermal agents, E-stim, Biofeedback, US, iontophoresis as indicated  [x]  Gait training  [x]  Patient education on BPPV/vestibular function, balance, postural re-education, activity modification, progression of HEP. Electronically signed by:  Adolfo Ruiz PT , OMT-C,  [id number]      Note: If patient does not return for scheduled/recommended follow up visits, this note will serve as a discharge from care along with the most recent update on progress.

## 2021-03-25 ENCOUNTER — HOSPITAL ENCOUNTER (OUTPATIENT)
Dept: PHYSICAL THERAPY | Age: 76
Setting detail: THERAPIES SERIES
Discharge: HOME | End: 2021-03-25
Payer: MEDICARE

## 2021-03-25 NOTE — FLOWSHEET NOTE
[place] [facility], [facility]  [address] [facility] [place]  Phone: [phone]   Fax:     [phone]    Physical Therapy  Cancellation/No-show Note  Patient Name:  Rubens Barrett  :  1945   Date:  3/25/2021  Cancelled visits to date: 1  No-shows to date: 0    Patient status for today's appointment patient:  [x]  Cancelled  []  Rescheduled appointment  []  No-show     Reason given by patient:  [x]  Patient ill - has a fever and doesn't feel well  []  Conflicting appointment  []  No transportation    []  Conflict with work  []  No reason given  []  Other:     Comments:      Phone call information:   []  Phone call made today to patient at _ time at number provided:      []  Patient answered, conversation as follows:    []  Patient did not answer, message left as follows:  []  Phone call not made today    Electronically signed by:  Maggie Fritz, [address] [facility] , [facility],  [id number]

## 2021-03-29 ENCOUNTER — HOSPITAL ENCOUNTER (OUTPATIENT)
Dept: PHYSICAL THERAPY | Age: 76
Setting detail: THERAPIES SERIES
Discharge: HOME | End: 2021-03-29
Payer: MEDICARE

## 2021-03-29 PROCEDURE — 97112 NEUROMUSCULAR REEDUCATION: CPT

## 2021-03-29 NOTE — FLOWSHEET NOTE
[place] [facility], [facility]  [address] [facility] [facility] [place], [facility]  Phone: [phone]   Fax:     [phone]      Physical Therapy Treatment Note/ Progress Report:   Date:  3/29/2021    Patient Name:  Mariela Washburn    :  1945  MRN: [id number]    Pertinent Medical History:  asthma, graves disease, HTN, OA, diverticulitis, R RTC repair,    Medical/Treatment Diagnosis Information:  · Diagnosis: B BPPV  · Treatment Diagnosis: Vestibular impairment    Insurance/Certification information:  PT Insurance Information: Medicare  Physician Information:  Referring Practitioner: Gladis Joseph DO  Plan of care signed (Y/N): []  Yes [x]  No     Date of Patient follow up with Physician: VIVI     Progress Report: []  Yes  [x]  No     Date Range for reporting period:  Beginning: 3/23/2021  Ending:     Progress report due (10 Rx/or 30 days whichever is less):      Recertification due (POC duration/ or 90 days whichever is less):  21    Visit # Insurance Allowable Auth required?  BOMN []  Yes [x]  No     Latex Allergy:  [x]NO      []YES  Preferred Language for Healthcare:   [x]English       []other:    Functional Outcomes Measure:   Date Assessed: at eval  Test: [facility]  Score: 46    Pain level:  0/10  Dizziness level: 2-3/10     History of Injury:Patient reports onset of dizziness around 3/7/21. Patient states that she was sleeping and woke up to go to the bathroom. Upon waking the whole room was spinning and she could barely get out of the bed. Notes the spinning lasted secs and then she was dizzy with blurred vision for awhile afterwards. Patient notes she saw ENT on Friday who did some treatment to help her in his office. Prior to seeing ENT she continued to have dizziness when rolling in bed, sitting up, and turning head.   After seeing ENT on Friday, patient notes dizziness is better but still slightly present. Notices the spinning when rising from laying down, bending over, and when shaking head. SUBJECTIVE:  3/29: Patient reports that she has been feeling better since last visit. Patient states she does not get dizzy as often since treatment. Bending over and rising up are still things that tend to cause her dizziness. OBJECTIVE:   Postural Control Tests:  Clinical Test of Sensory Interaction for Balance (CTSIB) performed in Romberg stance  CONDITION TIME STRATEGY SWAY    Eyes open, firm surface x30 sec   none    Eyes closed, firm surface x30 sec ankle mild    Eyes open, foam surface x30 sec Hip  mild    Eyes closed, foam surface x10 sec step moderate      Gait: No major gait deviations noted                Oculomotor/Vestibular Examination:     Spontaneous nystagmus:       []? Left             []? Right           [x]? Absent  Gaze-Evoked nystagmus with fixation present:              Primary            []? Present       [x]? Absent              Right                []? Present       [x]? Absent              Left                  []? Present       [x]? Absent  VOR Head Thrust Test:          [x]? Normal       []? Abnormal    Comments:   VOR Cancellation:                  [x]? Normal       []? Abnormal    Comments:   Smooth Pursuit:                      [x]? Normal       []? Abnormal    Comments:   Saccades:                               [x]? Normal       []? Abnormal    Comments:   Convergence:                          [x]? Normal       []? Abnormal    Comments:      Positional Testing  R Hallpike-[place] maneuver:               Nystagmus:     [x]? Yes             []? No               [x]? Duration: 45 sec                                          [x]? Direction: down beating torsional nystagmus              Vertigo:            [x]? Yes             []? No               []? Duration:   L Hallpike-[place] maneuver:              Nystagmus:     [x]? Yes             []? No               [x]?  Duration: 45-60 sec [x]? Direction: down beating torsional nystagmus, worse on the L>R              Vertigo:            [x]? Yes             []? No               []? Duration:   Supine roll head right:              Nystagmus:     []? Yes             [x]? No               []? Duration:                                          []? Direction:                  Vertigo:            []? Yes             [x]? No               []? Duration:   Supine roll head left:              Nystagmus:     []? Yes             [x]? No               []? Duration:                                          []? Direction:                  Vertigo:            []? Yes             [x]? No               []? Duration:      RESTRICTIONS/PRECAUTIONS:     Exercises/Interventions:     Therapeutic Exercises (56361) Min: Resistance/Reps Notes/Cues                            Therapeutic Activities (95434) Min:0                              Neuromuscular Re-ed (53083) Min:40     Reassessment of positional testing x15 mins    Canalith Repositioning Procedure Epley - 2x for L anterior canalithiasis     Liberatory - 2x for L anterior canalithiasis Decreased severity of nystagmus and vertigo noted after maneuvers but still present. Manual Intervention (56379) Min:                Modalities  Min:                      Other Therapeutic Activities: Pt was educated x10 mins on PT POC, Diagnosis, Prognosis, pathomechanics as well as frequency and duration of scheduling future physical therapy appointments. Time was also taken on this day to answer all patient questions and participation in PT. Reviewed appointment policy in detail with patient and patient verbalized understanding. Home Exercise Program: Patient was instructed in the following for HEP: self-epley maneuver for L side. Patient verbalized/demonstrated understanding and was issued written handout. 3/29/21: Patient issued Kala Messina exercises for HEP.   Patient verbalized/demonstrated understanding and was issued written handout. Therapeutic Exercise and NMR EXR  [] (09438) Provided verbal/tactile cueing for activities related to strengthening, flexibility, endurance, ROM for improvements in LE, proximal hip, and core control with self care, mobility, lifting, ambulation. [x] (22016) Provided verbal/tactile cueing for activities related to improving balance, coordination, kinesthetic sense, posture, motor skill, proprioception  to assist with LE, proximal hip, and core control in self care, mobility, lifting, ambulation and eccentric single leg control.  [address] and Therapeutic Activities:    [] (66767 or 74697) Provided verbal/tactile cueing for activities related to improving balance, coordination, kinesthetic sense, posture, motor skill, proprioception and motor activation to allow for proper function of core, proximal hip and LE with self care and ADLs and functional mobility.   [] (69583) Gait Re-education- Provided training and instruction to the patient for proper LE, core and proximal hip recruitment and positioning and eccentric body weight control with ambulation re-education including up and down stairs     Home Exercise Program:    [] (74219) Reviewed/Progressed HEP activities related to strengthening, flexibility, endurance, ROM of core, proximal hip and LE for functional self-care, mobility, lifting and ambulation/stair navigation   [x] (18037)Reviewed/Progressed HEP activities related to improving balance, coordination, kinesthetic sense, posture, motor skill, proprioception of core, proximal hip and LE for self care, mobility, lifting, and ambulation/stair navigation      Manual Treatments:  PROM / STM / Oscillations-Mobs:  G-I, II, III, IV (PA's, Inf., Post.)  [] (66927) Provided manual therapy to mobilize LE, proximal hip and/or LS spine soft tissue/joints for the purpose of modulating pain, promoting relaxation,  increasing ROM, reducing/eliminating soft tissue swelling/inflammation/restriction, improving soft tissue extensibility and allowing for proper ROM for normal function with self care, mobility, lifting and ambulation. Charges:  Timed Code Treatment Minutes: 40   Total Treatment Minutes: 40      [] EVAL (LOW) 73766 (typically 20 minutes face-to-face)  [] EVAL (MOD) 69735 (typically 30 minutes face-to-face)  [] EVAL (HIGH) 69105 (typically 45 minutes face-to-face)  [] RE-EVAL     [] CK(23279) x     [] Dry needle 1 or 2 Muscles (74842)  [x] NMR (17504) x  3   [] Dry needle 3+ Muscles (95200)  [] Manual (80466) x     [] Ultrasound (51319) x  [] TA (08332) x     [] Mech Traction (42197)  [] ES(attended) (30671)     [] ES (un) (43399):   [] Vasopump (67540) [] Ionto (94907)   [] Other:    GOALS:  Patient stated goal: \"no dizziness\"  []? Progressing: []? Met: []? Not Met: []? Adjusted     Therapist goals for Patient:   Short Term Goals: To be achieved in: 2 weeks  1. Independent in HEP and progression per patient tolerance, in order to prevent re-injury. []? Progressing: []? Met: []? Not Met: []? Adjusted  2. Patient will have a decrease in dizziness/imbalance/symptoms by 40% to facilitate improvement in movement, function, balance and ADLs as indicated by Functional Deficits. []? Progressing: []? Met: []? Not Met: []? Adjusted     Long Term Goals: To be achieved in: at discharge  1. Disability index score of 25% or less for the [address] [place] [address] to assist with reaching prior level of function. []? Progressing: []? Met: []? Not Met: []? Adjusted  2. Patient will demonstrate negative oculomotor special testing/positional testing as indicated by patients Functional Deficits. []? Progressing: []? Met: []? Not Met: []? Adjusted  3. Patient will return to functional activities including rolling over in bed without increased symptoms or restriction. []? Progressing: []? Met: []? Not Met: []? Adjusted  5.  Patient will be able to ambulate in community with straight cane without increased symptoms or restriction. []? Progressing: []? Met: []? Not Met: []? Adjusted         ASSESSMENT:  See eval    Treatment/Activity Tolerance:  [x] Patient tolerated treatment well [] Patient limited by fatique  [] Patient limited by pain  [] Patient limited by other medical complications  [] Other:     Overall Progression Towards Functional goals/ Treatment Progress Update:  [] Patient is progressing as expected towards functional goals listed. [] Progression is slowed due to complexities/Impairments listed. [] Progression has been slowed due to co-morbidities. [x] Plan just implemented, too soon to assess goals progression <30days   [] Goals require adjustment due to lack of progress  [] Patient is not progressing as expected and requires additional follow up with physician  [] Other    Prognosis for POC: [x] Good [] Fair  [] Poor    Patient requires continued skilled intervention: [x] Yes  [] No        PLAN: Reassess positional testing. [x] Continue per plan of care [] Alter current plan (see comments)  [] Plan of care initiated [] Hold pending MD visit [] Discharge    Electronically signed by: Adolfo Ruiz PT , OMT-C,  [id number]      Note: If patient does not return for scheduled/recommended follow up visits, this note will serve as a discharge from care along with the most recent update on progress.

## 2021-04-02 ENCOUNTER — HOSPITAL ENCOUNTER (OUTPATIENT)
Dept: PHYSICAL THERAPY | Age: 76
Setting detail: THERAPIES SERIES
Discharge: HOME | End: 2021-04-02
Payer: MEDICARE

## 2021-04-02 PROCEDURE — 97112 NEUROMUSCULAR REEDUCATION: CPT

## 2021-04-02 NOTE — FLOWSHEET NOTE
[place] [facility], [facility]  [address] [facility] [facility] [place], [facility]  Phone: [phone]   Fax:     [phone]      Physical Therapy Treatment Note/ Progress Report:   Date:  2021    Patient Name:  Yamileth Haynes    :  1945  MRN: [id number]    Pertinent Medical History:  asthma, graves disease, HTN, OA, diverticulitis, R RTC repair,    Medical/Treatment Diagnosis Information:  · Diagnosis: B BPPV  · Treatment Diagnosis: Vestibular impairment    Insurance/Certification information:  PT Insurance Information: Medicare  Physician Information:  Referring Practitioner: Johana Tavarez DO  Plan of care signed (Y/N): []  Yes [x]  No     Date of Patient follow up with Physician: VIVI     Progress Report: []  Yes  [x]  No     Date Range for reporting period:  Beginning: 3/23/2021  Ending:     Progress report due (10 Rx/or 30 days whichever is less):      Recertification due (POC duration/ or 90 days whichever is less):  21    Visit # Insurance Allowable Auth required? 3/8 BOMN []  Yes [x]  No     Latex Allergy:  [x]NO      []YES  Preferred Language for Healthcare:   [x]English       []other:    Functional Outcomes Measure:   Date Assessed: at [facility]  Test: [address]  Score: 46    Pain level:  0/10  Dizziness level: 2-3/10     History of Injury:Patient reports onset of dizziness around 3/7/21. Patient states that she was sleeping and woke up to go to the bathroom. Upon waking the whole room was spinning and she could barely get out of the bed. Notes the spinning lasted secs and then she was dizzy with blurred vision for awhile afterwards. Patient notes she saw ENT on Friday who did some treatment to help her in his office. Prior to seeing ENT she continued to have dizziness when rolling in bed, sitting up, and turning head. After seeing ENT on Friday, patient notes dizziness is better but still slightly present. Notices the spinning when rising from laying down, bending over, and when shaking head. SUBJECTIVE:  3/29: Patient reports that she has been feeling better since last visit. Patient states she does not get dizzy as often since treatment. Bending over and rising up are still things that tend to cause her dizziness. 4/2: notes she woke the day after PT really dizzy. Since then her dizziness has calmed down. Not as bad today. OBJECTIVE:   Postural Control Tests:  Clinical Test of Sensory Interaction for Balance (CTSIB) performed in Romberg stance  CONDITION TIME STRATEGY SWAY    Eyes open, firm surface x30 sec   none    Eyes closed, firm surface x30 sec ankle mild    Eyes open, foam surface x30 sec Hip  mild    Eyes closed, foam surface x10 sec step moderate      Gait: No major gait deviations noted                Oculomotor/Vestibular Examination:     Spontaneous nystagmus:       []? Left             []? Right           [x]? Absent  Gaze-Evoked nystagmus with fixation present:              Primary            []? Present       [x]? Absent              Right                []? Present       [x]? Absent              Left                  []? Present       [x]? Absent  VOR Head Thrust Test:          [x]? Normal       []? Abnormal    Comments:   VOR Cancellation:                  [x]? Normal       []? Abnormal    Comments:   Smooth Pursuit:                      [x]? Normal       []? Abnormal    Comments:   Saccades:                               [x]? Normal       []? Abnormal    Comments:   Convergence:                          [x]? Normal       []? Abnormal    Comments:      Positional Testing -Updated 4/2/21  R Hallpike-[place] maneuver:               Nystagmus:     [x]? Yes             []? No               [x]? Duration:15 sec                                          [x]? Direction: down beating nystagmus - torsion difficult to assess              Vertigo:            [x]?  Yes             []? No Patient verbalized/demonstrated understanding and was issued written handout. 3/29/21: Patient issued Marine Catie exercises for HEP. Patient verbalized/demonstrated understanding and was issued written handout. Therapeutic Exercise and NMR EXR  [] (42921) Provided verbal/tactile cueing for activities related to strengthening, flexibility, endurance, ROM for improvements in LE, proximal hip, and core control with self care, mobility, lifting, ambulation. [x] (72518) Provided verbal/tactile cueing for activities related to improving balance, coordination, kinesthetic sense, posture, motor skill, proprioception  to assist with LE, proximal hip, and core control in self care, mobility, lifting, ambulation and eccentric single leg control.  [address] and Therapeutic Activities:    [] (43096 or 81860) Provided verbal/tactile cueing for activities related to improving balance, coordination, kinesthetic sense, posture, motor skill, proprioception and motor activation to allow for proper function of core, proximal hip and LE with self care and ADLs and functional mobility.   [] (95975) Gait Re-education- Provided training and instruction to the patient for proper LE, core and proximal hip recruitment and positioning and eccentric body weight control with ambulation re-education including up and down stairs     Home Exercise Program:    [] (19926) Reviewed/Progressed HEP activities related to strengthening, flexibility, endurance, ROM of core, proximal hip and LE for functional self-care, mobility, lifting and ambulation/stair navigation   [x] (12258)Reviewed/Progressed HEP activities related to improving balance, coordination, kinesthetic sense, posture, motor skill, proprioception of core, proximal hip and LE for self care, mobility, lifting, and ambulation/stair navigation      Manual Treatments:  PROM / STM / Oscillations-Mobs:  G-I, II, III, IV (PA's, Inf., Post.)  [] (04774) Provided manual therapy to mobilize LE, proximal hip and/or LS spine soft tissue/joints for the purpose of modulating pain, promoting relaxation,  increasing ROM, reducing/eliminating soft tissue swelling/inflammation/restriction, improving soft tissue extensibility and allowing for proper ROM for normal function with self care, mobility, lifting and ambulation. Charges:  Timed Code Treatment Minutes: 40   Total Treatment Minutes: 40      [] EVAL (LOW) 83915 (typically 20 minutes face-to-face)  [] EVAL (MOD) 58208 (typically 30 minutes face-to-face)  [] EVAL (HIGH) 34229 (typically 45 minutes face-to-face)  [] RE-EVAL     [] BY(18494) x     [] Dry needle 1 or 2 Muscles (54402)  [x] NMR (25543) x  3   [] Dry needle 3+ Muscles (91496)  [] Manual (76988) x     [] Ultrasound (43429) x  [] TA (18340) x     [] Mech Traction (26422)  [] ES(attended) (20109)     [] ES (un) (14992):   [] Vasopump (73004) [] Ionto (43814)   [] Other:    GOALS:  Patient stated goal: \"no dizziness\"  []? Progressing: []? Met: []? Not Met: []? Adjusted     Therapist goals for Patient:   Short Term Goals: To be achieved in: 2 weeks  1. Independent in HEP and progression per patient tolerance, in order to prevent re-injury. []? Progressing: []? Met: []? Not Met: []? Adjusted  2. Patient will have a decrease in dizziness/imbalance/symptoms by 40% to facilitate improvement in movement, function, balance and ADLs as indicated by Functional Deficits. []? Progressing: []? Met: []? Not Met: []? Adjusted     Long Term Goals: To be achieved in: at discharge  1. Disability index score of 25% or less for the [address] [place] [address] to assist with reaching prior level of function. []? Progressing: []? Met: []? Not Met: []? Adjusted  2. Patient will demonstrate negative oculomotor special testing/positional testing as indicated by patients Functional Deficits. []? Progressing: []? Met: []? Not Met: []? Adjusted  3.  Patient will return to functional activities including rolling over in bed without increased symptoms or restriction. []? Progressing: []? Met: []? Not Met: []? Adjusted  5. Patient will be able to ambulate in community with straight cane without increased symptoms or restriction. []? Progressing: []? Met: []? Not Met: []? Adjusted         ASSESSMENT:  Patient demonstrates down beating nystagmus greater on L than on R. Torsion difficult to assess due to lack of Frenzel lenses. Repeated repositioning procedures performed without resolution of nystagmus. Habituation noted though. Patient instructed to continue with deep head hanging maneuver on own at a home 2x/day. Treatment/Activity Tolerance:  [x] Patient tolerated treatment well [] Patient limited by fatique  [] Patient limited by pain  [] Patient limited by other medical complications  [] Other:     Overall Progression Towards Functional goals/ Treatment Progress Update:  [] Patient is progressing as expected towards functional goals listed. [] Progression is slowed due to complexities/Impairments listed. [] Progression has been slowed due to co-morbidities. [x] Plan just implemented, too soon to assess goals progression <30days   [] Goals require adjustment due to lack of progress  [] Patient is not progressing as expected and requires additional follow up with physician  [] Other    Prognosis for POC: [x] Good [] Fair  [] Poor    Patient requires continued skilled intervention: [x] Yes  [] No        PLAN: Reassess positional testing. [x] Continue per plan of care [] Alter current plan (see comments)  [] Plan of care initiated [] Hold pending MD visit [] Discharge    Electronically signed by: Jackie Chavira PT , OMSOLISC,  [id number]      Note: If patient does not return for scheduled/recommended follow up visits, this note will serve as a discharge from care along with the most recent update on progress.

## 2021-04-06 ENCOUNTER — HOSPITAL ENCOUNTER (OUTPATIENT)
Dept: PHYSICAL THERAPY | Age: 76
Setting detail: THERAPIES SERIES
Discharge: HOME | End: 2021-04-06
Payer: MEDICARE

## 2021-04-06 PROCEDURE — 97112 NEUROMUSCULAR REEDUCATION: CPT

## 2021-04-06 NOTE — FLOWSHEET NOTE
[place] [facility], [facility]  [address] [place], [place]  Phone: [phone]   Fax:     [phone]      Physical Therapy Treatment Note/ Progress Report:   Date:  2021    Patient Name:  Rasheeda Liang    :  1945  MRN: [id number]    Pertinent Medical History:  asthma, graves disease, HTN, OA, diverticulitis, R RTC repair,    Medical/Treatment Diagnosis Information:  · Diagnosis: B BPPV  · Treatment Diagnosis: Vestibular impairment    Insurance/Certification information:  PT Insurance Information: Medicare  Physician Information:  Referring Practitioner: Elo Corcoran DO  Plan of care signed (Y/N): []  Yes [x]  No     Date of Patient follow up with Physician: VIVI     Progress Report: []  Yes  [x]  No     Date Range for reporting period:  Beginning: 3/23/2021  Ending:     Progress report due (10 Rx/or 30 days whichever is less):      Recertification due (POC duration/ or 90 days whichever is less):  21    Visit # Insurance Allowable Auth required?  BOMN []  Yes [x]  No     Latex Allergy:  [x]NO      []YES  Preferred Language for Healthcare:   [x]English       []other:    Functional Outcomes Measure:   Date Assessed: at eval  Test: [facility]  Score: 46    Pain level:  0/10  Dizziness level: 2-3/10     History of Injury:Patient reports onset of dizziness around 3/7/21. Patient states that she was sleeping and woke up to go to the bathroom. Upon waking the whole room was spinning and she could barely get out of the bed. Notes the spinning lasted secs and then she was dizzy with blurred vision for awhile afterwards. Patient notes she saw ENT on Friday who did some treatment to help her in his office. Prior to seeing ENT she continued to have dizziness when rolling in bed, sitting up, and turning head. After seeing ENT on Friday, patient notes dizziness is better but still slightly present. []? Yes             [x]? No               []? Duration:   L Hallpike-Delmi maneuver:              Nystagmus:     [x]? Yes             []? No               [x]? Duration: 30 sec                                       [x]? Direction: down beating nystagmus - torsion difficult to assess, worse on the L>R              Vertigo:            [x]? Yes             []? No               []? Duration:   Supine roll head right:              Nystagmus:     []? Yes             [x]? No               []? Duration:                                          []? Direction:                  Vertigo:            []? Yes             [x]? No               []? Duration:   Supine roll head left:              Nystagmus:     []? Yes             [x]? No               []? Duration:                                          []? Direction:                  Vertigo:            []? Yes             [x]? No               []? Duration:      RESTRICTIONS/PRECAUTIONS:     Exercises/Interventions:     Therapeutic Exercises (32028) Min: Resistance/Reps Notes/Cues                            Therapeutic Activities (70614) Min:0                              Neuromuscular Re-ed (58982) Min:40     Reassessment of positional testing x15 mins    Canalith Repositioning Procedure for L anterior canalithiasis Deep Head hanging maneuver 3x    Reverse Epley 1x         Decreased severity of nystagmus and vertigo noted after maneuvers but still present. Manual Intervention (36335) Min:                Modalities  Min:                      Other Therapeutic Activities: Pt was educated x10 mins on PT POC, Diagnosis, Prognosis, pathomechanics as well as frequency and duration of scheduling future physical therapy appointments. Time was also taken on this day to answer all patient questions and participation in PT. Reviewed appointment policy in detail with patient and patient verbalized understanding.      Home Exercise Program: Patient was instructed in the following for HEP: self-epley maneuver for L side. Patient verbalized/demonstrated understanding and was issued written handout. 3/29/21: Patient issued Keon Nail exercises for HEP. Patient verbalized/demonstrated understanding and was issued written handout. Therapeutic Exercise and NMR EXR  [] (24401) Provided verbal/tactile cueing for activities related to strengthening, flexibility, endurance, ROM for improvements in LE, proximal hip, and core control with self care, mobility, lifting, ambulation. [x] (95034) Provided verbal/tactile cueing for activities related to improving balance, coordination, kinesthetic sense, posture, motor skill, proprioception  to assist with LE, proximal hip, and core control in self care, mobility, lifting, ambulation and eccentric single leg control.  [address] and Therapeutic Activities:    [] (10084 or 11102) Provided verbal/tactile cueing for activities related to improving balance, coordination, kinesthetic sense, posture, motor skill, proprioception and motor activation to allow for proper function of core, proximal hip and LE with self care and ADLs and functional mobility.   [] (14789) Gait Re-education- Provided training and instruction to the patient for proper LE, core and proximal hip recruitment and positioning and eccentric body weight control with ambulation re-education including up and down stairs     Home Exercise Program:    [] (39716) Reviewed/Progressed HEP activities related to strengthening, flexibility, endurance, ROM of core, proximal hip and LE for functional self-care, mobility, lifting and ambulation/stair navigation   [x] (30752)Reviewed/Progressed HEP activities related to improving balance, coordination, kinesthetic sense, posture, motor skill, proprioception of core, proximal hip and LE for self care, mobility, lifting, and ambulation/stair navigation      Manual Treatments:  PROM / STM / Oscillations-Mobs:  G-I, II, III, IV (PA's, Inf., Post.)  [] (14435) Provided manual therapy to mobilize LE, proximal hip and/or LS spine soft tissue/joints for the purpose of modulating pain, promoting relaxation,  increasing ROM, reducing/eliminating soft tissue swelling/inflammation/restriction, improving soft tissue extensibility and allowing for proper ROM for normal function with self care, mobility, lifting and ambulation. Charges:  Timed Code Treatment Minutes: 40   Total Treatment Minutes: 40      [] EVAL (LOW) 91091 (typically 20 minutes face-to-face)  [] EVAL (MOD) 31488 (typically 30 minutes face-to-face)  [] EVAL (HIGH) 14368 (typically 45 minutes face-to-face)  [] RE-EVAL     [] IZ(07181) x     [] Dry needle 1 or 2 Muscles (94001)  [x] NMR (26026) x  3   [] Dry needle 3+ Muscles (09710)  [] Manual (70640) x     [] Ultrasound (48850) x  [] TA (82283) x     [] Mech Traction (36107)  [] ES(attended) (25588)     [] ES (un) (78787):   [] Vasopump (18396) [] Ionto (31606)   [] Other:    GOALS:  Patient stated goal: \"no dizziness\"  []? Progressing: []? Met: []? Not Met: []? Adjusted     Therapist goals for Patient:   Short Term Goals: To be achieved in: 2 weeks  1. Independent in HEP and progression per patient tolerance, in order to prevent re-injury. []? Progressing: []? Met: []? Not Met: []? Adjusted  2. Patient will have a decrease in dizziness/imbalance/symptoms by 40% to facilitate improvement in movement, function, balance and ADLs as indicated by Functional Deficits. []? Progressing: []? Met: []? Not Met: []? Adjusted     Long Term Goals: To be achieved in: at discharge  1. Disability index score of 25% or less for the [address] [place] [address] to assist with reaching prior level of function. []? Progressing: []? Met: []? Not Met: []? Adjusted  2. Patient will demonstrate negative oculomotor special testing/positional testing as indicated by patients Functional Deficits. []? Progressing: []? Met: []? Not Met: []? Adjusted  3.  Patient will return to functional activities including rolling over in bed without increased symptoms or restriction. []? Progressing: []? Met: []? Not Met: []? Adjusted  5. Patient will be able to ambulate in community with straight cane without increased symptoms or restriction. []? Progressing: []? Met: []? Not Met: []? Adjusted         ASSESSMENT:  Patient demonstrates down beating nystagmus greater on L than on R. Torsion difficult to assess due to lack of Frenzel lenses. Repeated repositioning procedures performed without resolution of nystagmus. Habituation noted though. Patient instructed to continue with deep head hanging maneuver on own at a home 2x/day. Treatment/Activity Tolerance:  [x] Patient tolerated treatment well [] Patient limited by fatique  [] Patient limited by pain  [] Patient limited by other medical complications  [] Other:     Overall Progression Towards Functional goals/ Treatment Progress Update:  [] Patient is progressing as expected towards functional goals listed. [] Progression is slowed due to complexities/Impairments listed. [] Progression has been slowed due to co-morbidities. [x] Plan just implemented, too soon to assess goals progression <30days   [] Goals require adjustment due to lack of progress  [] Patient is not progressing as expected and requires additional follow up with physician  [] Other    Prognosis for POC: [x] Good [] Fair  [] Poor    Patient requires continued skilled intervention: [x] Yes  [] No        PLAN: Reassess positional testing. [x] Continue per plan of care [] Alter current plan (see comments)  [] Plan of care initiated [] Hold pending MD visit [] Discharge    Electronically signed by: Keny Gardner PT , OMT-C,  [id number]      Note: If patient does not return for scheduled/recommended follow up visits, this note will serve as a discharge from care along with the most recent update on progress.

## 2021-04-08 ENCOUNTER — HOSPITAL ENCOUNTER (OUTPATIENT)
Dept: PHYSICAL THERAPY | Age: 76
Setting detail: THERAPIES SERIES
Discharge: HOME | End: 2021-04-08
Payer: MEDICARE

## 2021-04-08 PROCEDURE — 97112 NEUROMUSCULAR REEDUCATION: CPT

## 2021-04-08 NOTE — PROGRESS NOTES
[facility]  Cardiology Progress Note    Lilian Kirk  1945    April 9, 2021      Reason for Referral: acute ischemic stroke, PFO per TTE with bubble study     CC: \"I am feeling okay. \"       Subjective:     History of Present Illness:    Lilian Kirk is a 67 y.o. patient with a PMH significant for former smoker, Graves Disease, HTN, HLD, and who is seeing us today for management of recent acute ischemic stroke, PFO revealed per TTE with bubble study. Neurology followed during admission and we discussed possible etiology. No physical limitations. Use of cane. She has hx of panic attack resolved by xanax. States she has been under a lot of family stress. He daughter lives with her. 30 day Event monitor 11/2019 SR with 1 episodes of 20 beat atrial run. Today, she is has no new sound cardiac complaints. She says that she is feeling good. She uses a cane for ambulation. She does report increased fatigue but could be related to her thyroid. Patient denies exertional chest pain/pressure, dyspnea at rest, CLARKE, PND, orthopnea, palpitations, lightheadedness, weight changes, changes in LE edema, and syncope. Patient reports compliance to her medications. Past Medical History:   has a past medical history of Asthma, Diverticulitis, Graves disease, Hyperlipidemia, Hypertension, Hyperthyroidism, Hypothyroidism, Mild persistent asthma without complication, Osteoarthritis, Peptic ulcer, unspecified site, unspecified as acute or chronic, without mention of hemorrhage or perforation, Prolonged emergence from general anesthesia, Sleep apnea, Venous insufficiency, and Wears glasses. Surgical History:   has a past surgical history that includes Hysterectomy; Cholecystectomy (2001); Colonoscopy (2011); Rotator cuff repair (Right); Foot surgery (Left); Knee arthroscopy (Left); and blepharoplasty (Left). Social History:   reports that she quit smoking about 56 years ago.  Her smoking use included cigarettes. She started smoking about 59 years ago. She has a 0.75 pack-year smoking history. She has never used smokeless tobacco. She reports current alcohol use. She reports that she does not use drugs. Family History:  family history includes Colon Cancer in her father; Diabetes in her sister; Hypertension in her mother. Home Medications:  Were reviewed and are listed in nursing record and/or below  Prior to Admission medications    Medication Sig Start Date End Date Taking? Authorizing Provider   telmisartan-hydrochlorothiazide (MICARDIS HCT) 80-25 MG per tablet TAKE 1 TABLET BY MOUTH DAILY 2/17/21  Yes Rashaad Mcnulty MD   loratadine (CLARITIN) 10 MG tablet Take 10 mg by mouth daily   Yes Historical Provider, MD   pravastatin (PRAVACHOL) 20 MG tablet Take 1 tablet by mouth nightly 2/8/21  Yes Chad Tierney MD   amLODIPine (NORVASC) 2.5 MG tablet TAKE 1 TABLET BY MOUTH DAILY 1/9/21  Yes Rashaad Mcnulty MD   nebivolol (BYSTOLIC) 10 MG tablet TAKE 1 TABLET DAILY 12/22/20  Yes Rashaad Mcnulty MD   dicyclomine (BENTYL) 20 MG tablet Take 1 tablet by mouth every 6 hours 12/14/20  Yes Rashaad Mcnulty MD   levothyroxine (SYNTHROID) 125 MCG tablet TAKE 1 TABLET BY MOUTH DAILY 12/14/20  Yes Rashaad Mcnulty MD   Multiple Vitamins-Minerals (THERAPEUTIC MULTIVITAMIN-MINERALS) tablet Take 1 tablet by mouth daily   Yes Historical Provider, MD   ezetimibe (ZETIA) 10 MG tablet TAKE 1 TABLET DAILY 5/26/20  Yes Rashaad Mcnulty MD   vitamin C (ASCORBIC ACID) 500 MG tablet Take 500 mg by mouth daily   Yes Historical Provider, MD   Cholecalciferol (VITAMIN D3) 125 MCG (5000 UT) TABS Take 50 each by mouth   Yes Historical Provider, MD   MAGNESIUM PO Take by mouth   Yes Historical Provider, MD   mometasone-formoterol (Jj Haynes) 200-5 MCG/ACT inhaler INHALE 2 PUFFS INTO THE LUNGS TWICE DAILY  Patient taking differently:  Only using once or twice weekly 9/24/19  Yes Enma Prince MD   [facility] 108 (90 Base) MCG/ACT inhaler USE 2 INHALATIONS ORALLY   INTO THE LUNGS EVERY 6     HOURS AS NEEDEDFOR        WHEEZING 7/15/19  Yes Rosa M Nazario MD   cyanocobalamin ([facility] VITAMIN B12) 1000 MCG tablet Take 1 tablet by mouth daily 4/15/16  Yes Rosa M Nazario MD   aspirin 81 MG tablet Take 81 mg by mouth daily. Yes Historical Provider, MD        Allergies:  Montelukast sodium, Sulfa antibiotics, and Strawberry extract       Review of Systems: all reviewed and refer to HPI   · Constitutional: no unanticipated weight loss. There's been no change in energy level, sleep pattern, or activity level. No fevers, chills. · Eyes: No visual changes or diplopia. No scleral icterus. · ENT: No Headaches, hearing loss or vertigo. No mouth sores or sore throat. · Cardiovascular: No Chest pain, tightness or discomfort.  No Shortness of breath. No Dyspnea on exertion, Orthopnea, Paroxysmal nocturnal dyspnea or breathlessness at rest.   No Palpitations.  No Syncope ('blackouts', 'faints', 'collapse') or dizziness. · Respiratory: No cough or wheezing, no sputum production. No hematemesis. · Gastrointestinal: No abdominal pain, appetite loss, blood in stools. No change in bowel or bladder habits. · Genitourinary: No dysuria, trouble voiding, or hematuria. · Musculoskeletal:  No gait disturbance, no joint complaints. · Integumentary: No rash or pruritis. · Neurological: No headache, diplopia, change in muscle strength, numbness or tingling. · Psychiatric: No anxiety or depression. · Endocrine: No temperature intolerance. No excessive thirst, fluid intake, or urination. No tremor. · Hematologic/Lymphatic: No abnormal bruising or bleeding, blood clots or swollen lymph nodes. · Allergic/Immunologic: No nasal congestion or hives.       Objective:     PHYSICAL EXAM:      Vitals:    04/09/21 0914   BP: 128/72   Pulse: 67   SpO2: 97%   Weight: 223 lb 6.4 oz (101.3 kg)   Height: 5' 1\" (1.549 m)      Weight: 223 lb 6.4 oz (101.3 kg)       General Appearance:  Alert, cooperative, no distress, appears stated age. Head:  Normocephalic, without obvious abnormality, atraumatic. Eyes:  Pupils equal and round. No scleral icterus. Mouth: Moist mucosa, no pharyngeal erythema. Nose: Nares normal. No drainage or sinus tenderness. Neck: Supple, symmetrical, trachea midline. No adenopathy. No tenderness/mass/nodules. No carotid bruit or elevated JVD. Lungs:   Respiratory Effort: Normal   Auscultation: Clear to auscultation bilaterally, respirations unlabored. No wheeze, rales   Chest Wall:  No tenderness or deformity. Cardiovascular:    Pulses  Palpation: normal   Ascultation: Regular rate, S1/ S2 normal. No murmur, rub, or gallop. 2+ radial and pedal pulses, symmetric  Carotid  Femoral   Abdomen and Gastrointestinal:   Soft, non-tender, bowel sounds active. Liver and Spleen  Masses   Musculoskeletal: No muscle wasting  Back  Gait   Extremities: Extremities normal, atraumatic. No cyanosis or edema. No cyanosis clubbing       Skin: Inspection and palpation performed, no rashes or lesions. Pysch: Normal mood and affect.  Alert and oriented to time place person   Neurologic: Normal gross motor and sensory exam.       Labs     All labs have been reviewed    Lab Results   Component Value Date    WBC 5.7 03/15/2021    RBC 4.51 03/15/2021    HGB 13.5 03/15/2021    HCT 39.9 03/15/2021    MCV 88.5 03/15/2021    RDW 12.7 03/15/2021     03/15/2021     Lab Results   Component Value Date     03/15/2021    K 4.0 03/15/2021    K 4.5 04/22/2018     03/15/2021    CO2 32 03/15/2021    BUN 15 03/15/2021    CREATININE 0.7 03/15/2021    GFRAA >60 03/15/2021    GFRAA >60 02/28/2013    AGRATIO 1.3 03/15/2021    LABGLOM >60 03/15/2021    LABGLOM 62.5 07/05/2011    GLUCOSE 96 03/15/2021    GLUCOSE 98 07/05/2011    PROT 7.2 03/15/2021    PROT 6.7 02/28/2013    CALCIUM 9.8 03/15/2021    BILITOT 0.3 03/15/2021    ALKPHOS 130 03/15/2021    AST 12 03/15/2021    ALT 15 03/15/2021     No results found for: PTINR  Lab Results   Component Value Date    LABA1C 5.6 10/18/2019     Lab Results   Component Value Date    TROPONINI <0.01 2020       Cardiac, Vascular and Imaging Data all Personally Reviewed in Detail by Myself      EK2020 Sinus bradycardia    Echocardiogram: 10/17/19  Summary   Suboptimal image quality.   Normal left ventricle size, wall thickness, and systolic function with an   estimated ejection fraction of 55-60%. No regional wall motion abnormalities are seen.   Normal right ventricular size and function.   The left atrium is mildly dilated.   Mild aortic and tricuspid regurgitation.   Trivial mitral regurgitation.   A bubble study was performed and shows evidence of right to left shunting   consistent with a patent foramen ovale    Stress Test: none     Cath: none     Other imaging:     MRI brain: 10/17/19  Punctate acute infarct in the hiram.  No intracranial hemorrhage or mass   effect. CT head wo contrast:10/17/19  No acute intracranial abnormality    CTA head and neck:10/17/19  Unremarkable CTA of the head and neck    JUSTINE 2020  Normal LV function  RV Function is normal  Mild MR  mild-moderate AI  A bubble study was performed and fails to show evidence of right to left  shunting due to the increased pressure of the left atrium     Assessment and Plan     Ischemic stroke infarcts noted in the right posterior hiram region  No signs/symptoms of stroke. Continue Asa and statin therapy. Patent foramen ovale   Continue Asa and statin therapy. Hypertension, essential   Controlled/ Continue current medical management. Hyperlipemia, mixed   Continue Pravachol and Zetia. Graves Disease  Managed by PCP. Follow up in 1 year. Thank you for allowing us to participate in the care of Hank Carter. Please do not hesitate to contact me if you have any questions.     Kiarra Ricardo MD, MPH    [id number] [facility] [address] [facility], [facility] [place] [place] [address]  Ph: [phone]  Fax: [phone]    This note was scribed in the presence of Dr Flor Richard, by Min Gonzales RN  Physician Attestation:  The scribes documentation has been prepared under my direction and personally reviewed by me in its entirety. I confirm that the note above accurately reflects all work, treatment, procedures, and medical decision making performed by me.

## 2021-04-08 NOTE — FLOWSHEET NOTE
patient notes dizziness is better but still slightly present. Notices the spinning when rising from laying down, bending over, and when shaking head. SUBJECTIVE:  3/29: Patient reports that she has been feeling better since last visit. Patient states she does not get dizzy as often since treatment. Bending over and rising up are still things that tend to cause her dizziness. 4/2: notes she woke the day after PT really dizzy. Since then her dizziness has calmed down. Not as bad today. 4/6: dizziness is better but still present with bending over and occasionally with sitting up in bed at night. 4/8: about the same as last session; did have a few sharp pain in R ear during the hep yesterday, but not since     OBJECTIVE:   Postural Control Tests:  Clinical Test of Sensory Interaction for Balance (CTSIB) performed in Romberg stance  CONDITION TIME STRATEGY SWAY    Eyes open, firm surface x30 sec   none    Eyes closed, firm surface x30 sec ankle mild    Eyes open, foam surface x30 sec Hip  mild    Eyes closed, foam surface x10 sec step moderate      Gait: No major gait deviations noted                Oculomotor/Vestibular Examination:updated 4/8     Spontaneous nystagmus:       []? Left             []? Right           [x]? Absent  Gaze-Evoked nystagmus with fixation present:              Primary            []? Present       [x]? Absent              Right                [x]? Present       []? Absent              Left                  [x]? Present       []? Absent  VOR Head Thrust Test:          []? Normal       [x]? Abnormal    Comments: slight corrections noted R  VOR Cancellation:                  [x]? Normal       []? Abnormal    Comments:   Smooth Pursuit:                      []? Normal       [x]? Abnormal    Comments: nystagmus and dizziness and double vision with s/s tracking both L/R  Saccades:                               [x]? Normal       []?  Abnormal    Comments: dizzy with looking left Convergence:                          [x]? Normal       []? Abnormal    Comments:      Positional Testing -Updated 4/6/21  R Hallpike-[place]mi maneuver:               Nystagmus:     [x]? Yes             []? No               [x]? Duration: very mild downbeating nystagmus x 30 sec                                            []? Direction:               Vertigo:            []? Yes             [x]? No               []? Duration:   L [place] maneuver:              Nystagmus:     [x]? Yes             []? No               [x]? Duration: 45 sec                                       [x]? Direction: down beating nystagmus - torsion difficult to assess, worse on the L>R              Vertigo:            [x]? Yes             []? No               []? Duration:   Supine roll head right:              Nystagmus:     []? Yes             [x]? No               []? Duration:                                          []? Direction:                  Vertigo:            []? Yes             [x]? No               []? Duration: mild double vision B direction   Supine roll head left:              Nystagmus:     []? Yes             [x]? No               []? Duration:                                          []? Direction:                  Vertigo:            []? Yes             [x]?  No               []? Duration:      RESTRICTIONS/PRECAUTIONS:     Exercises/Interventions:     Therapeutic Exercises (47652) Min: Resistance/Reps Notes/Cues                            Therapeutic Activities (24930) Min:0                              Neuromuscular Re-ed (92206) Min:40     Reassessment of positional testing x15 mins See above   Canalith Repositioning Procedure for L anterior canalithiasis   R Epley performed x 2       Retesting of L [place] Hallpike after Epley:  * Very mild dizziness and nystagmus after 1st Epley   * no  Dizziness or nystagmus after 2nd Epley              Manual Intervention (79210) Min:                Modalities  Min: Other Therapeutic Activities: Pt was educated x10 mins on PT POC, Diagnosis, Prognosis, pathomechanics as well as frequency and duration of scheduling future physical therapy appointments. Time was also taken on this day to answer all patient questions and participation in PT. Reviewed appointment policy in detail with patient and patient verbalized understanding. Home Exercise Program: Patient was instructed in the following for HEP: self-epley maneuver for L side. Patient verbalized/demonstrated understanding and was issued written handout. 3/29/21: Patient issued Hortencia Dally exercises for HEP. Patient verbalized/demonstrated understanding and was issued written handout. Therapeutic Exercise and NMR EXR  [] (61323) Provided verbal/tactile cueing for activities related to strengthening, flexibility, endurance, ROM for improvements in LE, proximal hip, and core control with self care, mobility, lifting, ambulation. [x] (55968) Provided verbal/tactile cueing for activities related to improving balance, coordination, kinesthetic sense, posture, motor skill, proprioception  to assist with LE, proximal hip, and core control in self care, mobility, lifting, ambulation and eccentric single leg control.  [address] and Therapeutic Activities:    [] (57247 or 86806) Provided verbal/tactile cueing for activities related to improving balance, coordination, kinesthetic sense, posture, motor skill, proprioception and motor activation to allow for proper function of core, proximal hip and LE with self care and ADLs and functional mobility.   [] (05705) Gait Re-education- Provided training and instruction to the patient for proper LE, core and proximal hip recruitment and positioning and eccentric body weight control with ambulation re-education including up and down stairs     Home Exercise Program:    [] (65477) Reviewed/Progressed HEP activities related to strengthening, flexibility, endurance, ROM of core, proximal hip and LE for functional self-care, mobility, lifting and ambulation/stair navigation   [x] (72302)Reviewed/Progressed HEP activities related to improving balance, coordination, kinesthetic sense, posture, motor skill, proprioception of core, proximal hip and LE for self care, mobility, lifting, and ambulation/stair navigation      Manual Treatments:  PROM / STM / Oscillations-Mobs:  G-I, II, III, IV (PA's, Inf., Post.)  [] (09229) Provided manual therapy to mobilize LE, proximal hip and/or LS spine soft tissue/joints for the purpose of modulating pain, promoting relaxation,  increasing ROM, reducing/eliminating soft tissue swelling/inflammation/restriction, improving soft tissue extensibility and allowing for proper ROM for normal function with self care, mobility, lifting and ambulation. Charges:  Timed Code Treatment Minutes: 40   Total Treatment Minutes: 40      [] EVAL (LOW) 43502 (typically 20 minutes face-to-face)  [] EVAL (MOD) 38522 (typically 30 minutes face-to-face)  [] EVAL (HIGH) 63724 (typically 45 minutes face-to-face)  [] RE-EVAL     [] VE(69657) x     [] Dry needle 1 or 2 Muscles (04525)  [x] NMR (45648) x  3   [] Dry needle 3+ Muscles (84238)  [] Manual (27940) x     [] Ultrasound (61736) x  [] TA (71182) x     [] Mech Traction (17784)  [] ES(attended) (80010)     [] ES (un) (14445):   [] Vasopump (50385) [] Ionto (45294)   [] Other:    GOALS:  Patient stated goal: \"no dizziness\"  []? Progressing: []? Met: []? Not Met: []? Adjusted     Therapist goals for Patient:   Short Term Goals: To be achieved in: 2 weeks  1. Independent in HEP and progression per patient tolerance, in order to prevent re-injury. []? Progressing: []? Met: []? Not Met: []? Adjusted  2. Patient will have a decrease in dizziness/imbalance/symptoms by 40% to facilitate improvement in movement, function, balance and ADLs as indicated by Functional Deficits. []? Progressing: []? Met: []?  Not Met: []? Adjusted     Long Term Goals: To be achieved in: at discharge  1. Disability index score of 25% or less for the [facility] AND [facility] to assist with reaching prior level of function. []? Progressing: []? Met: []? Not Met: []? Adjusted  2. Patient will demonstrate negative oculomotor special testing/positional testing as indicated by patients Functional Deficits. []? Progressing: []? Met: []? Not Met: []? Adjusted  3. Patient will return to functional activities including rolling over in bed without increased symptoms or restriction. []? Progressing: []? Met: []? Not Met: []? Adjusted  5. Patient will be able to ambulate in community with straight cane without increased symptoms or restriction. []? Progressing: []? Met: []? Not Met: []? Adjusted         ASSESSMENT:  Patient demonstrates down beating nystagmus greater on L than on R. Torsion difficult to assess due to lack of Frenzel lenses. Repeated repositioning procedures performed without resolution of nystagmus. Habituation noted though. Patient instructed to continue with deep head hanging maneuver on own at a home 2x/day. Treatment/Activity Tolerance:  [x] Patient tolerated treatment well [] Patient limited by fatique  [] Patient limited by pain  [] Patient limited by other medical complications  [] Other:     Overall Progression Towards Functional goals/ Treatment Progress Update:  [] Patient is progressing as expected towards functional goals listed. [] Progression is slowed due to complexities/Impairments listed. [] Progression has been slowed due to co-morbidities.   [x] Plan just implemented, too soon to assess goals progression <30days   [] Goals require adjustment due to lack of progress  [] Patient is not progressing as expected and requires additional follow up with physician  [] Other    Prognosis for POC: [x] Good [] Fair  [] Poor    Patient requires continued skilled intervention: [x] Yes  [] No        PLAN: Reassess positional testing at Monday session   [x] Continue per plan of care [] Alter current plan (see comments)  [] Plan of care initiated [] Hold pending MD visit [] Discharge    Electronically signed by: Chava Roper PT ,[id number]      Note: If patient does not return for scheduled/recommended follow up visits, this note will serve as a discharge from care along with the most recent update on progress.

## 2021-04-09 ENCOUNTER — OFFICE VISIT (OUTPATIENT)
Dept: CARDIOLOGY CLINIC | Age: 76
End: 2021-04-09
Payer: MEDICARE

## 2021-04-09 VITALS
OXYGEN SATURATION: 97 % | DIASTOLIC BLOOD PRESSURE: 72 MMHG | HEART RATE: 67 BPM | WEIGHT: 223.4 LBS | SYSTOLIC BLOOD PRESSURE: 128 MMHG | HEIGHT: 61 IN | BODY MASS INDEX: 42.18 KG/M2

## 2021-04-09 DIAGNOSIS — I63.9 CEREBROVASCULAR ACCIDENT (CVA), UNSPECIFIED MECHANISM (HCC): ICD-10-CM

## 2021-04-09 DIAGNOSIS — Q21.12 PFO (PATENT FORAMEN OVALE): Primary | ICD-10-CM

## 2021-04-09 DIAGNOSIS — E78.2 MIXED HYPERLIPIDEMIA: ICD-10-CM

## 2021-04-09 DIAGNOSIS — I10 ESSENTIAL HYPERTENSION: ICD-10-CM

## 2021-04-09 PROCEDURE — 99213 OFFICE O/P EST LOW 20 MIN: CPT | Performed by: INTERNAL MEDICINE

## 2021-04-09 PROCEDURE — 4040F PNEUMOC VAC/ADMIN/RCVD: CPT | Performed by: INTERNAL MEDICINE

## 2021-04-09 PROCEDURE — 1036F TOBACCO NON-USER: CPT | Performed by: INTERNAL MEDICINE

## 2021-04-09 PROCEDURE — 1123F ACP DISCUSS/DSCN MKR DOCD: CPT | Performed by: INTERNAL MEDICINE

## 2021-04-09 PROCEDURE — G8399 PT W/DXA RESULTS DOCUMENT: HCPCS | Performed by: INTERNAL MEDICINE

## 2021-04-09 PROCEDURE — 1090F PRES/ABSN URINE INCON ASSESS: CPT | Performed by: INTERNAL MEDICINE

## 2021-04-09 PROCEDURE — G8427 DOCREV CUR MEDS BY ELIG CLIN: HCPCS | Performed by: INTERNAL MEDICINE

## 2021-04-09 PROCEDURE — G8417 CALC BMI ABV UP PARAM F/U: HCPCS | Performed by: INTERNAL MEDICINE

## 2021-04-09 NOTE — PATIENT INSTRUCTIONS
Patient Education        Heart-Healthy Diet: Care Instructions  Your Care Instructions     A heart-healthy diet has lots of vegetables, fruits, nuts, beans, and whole grains, and is low in salt. It limits foods that are high in saturated fat, such as meats, cheeses, and fried foods. It may be hard to change your diet, but even small changes can lower your risk of heart attack and heart disease. Follow-up care is a key part of your treatment and safety. Be sure to make and go to all appointments, and call your doctor if you are having problems. It's also a good idea to know your test results and keep a list of the medicines you take. How can you care for yourself at home? Watch your portions  · Use food labels to learn what the recommended servings are for the foods you eat. · Eat only the number of calories you need to stay at a healthy weight. If you need to lose weight, eat fewer calories than your body burns (through exercise and other physical activity). Eat more fruits and vegetables  · Eat a variety of fruit and vegetables every day. Dark green, deep orange, red, or yellow fruits and vegetables are especially good for you. Examples include spinach, carrots, peaches, and berries. · Keep carrots, celery, and other veggies handy for snacks. Buy fruit that is in season and store it where you can see it so that you will be tempted to eat it. · Cook dishes that have a lot of veggies in them, such as stir-fries and soups. Limit saturated fat  · Read food labels, and try to avoid saturated fats. They increase your risk of heart disease. · Use olive or canola oil when you cook. · Bake, broil, grill, or steam foods instead of frying them. · Choose lean meats instead of high-fat meats such as hot dogs and sausages. Cut off all visible fat when you prepare meat. · Eat fish, skinless poultry, and meat alternatives such as soy products instead of high-fat meats.  Soy products, such as tofu, may be especially

## 2021-04-12 ENCOUNTER — HOSPITAL ENCOUNTER (OUTPATIENT)
Dept: PHYSICAL THERAPY | Age: 76
Setting detail: THERAPIES SERIES
Discharge: HOME | End: 2021-04-12
Payer: MEDICARE

## 2021-04-12 PROCEDURE — 97112 NEUROMUSCULAR REEDUCATION: CPT

## 2021-04-12 NOTE — FLOWSHEET NOTE
[facility], [facility]  [address] [place], [facility]  Phone: [phone]   Fax:     [phone]      Physical Therapy Treatment Note/ Progress Report:   Date:  2021    Patient Name:  Lashawn Herman    :  1945  MRN: [id number]    Pertinent Medical History:  asthma, graves disease, HTN, OA, diverticulitis, R RTC repair,    Medical/Treatment Diagnosis Information:  · Diagnosis: B BPPV  · Treatment Diagnosis: Vestibular impairment    Insurance/Certification information:  PT Insurance Information: Medicare  Physician Information:  Referring Practitioner: Jarod Valdez DO  Plan of care signed (Y/N): []  Yes [x]  No     Date of Patient follow up with Physician: VIVI     Progress Report: []  Yes  [x]  No     Date Range for reporting period:  Beginning: 3/23/2021  Ending:     Progress report due (10 Rx/or 30 days whichever is less):  62    Recertification due (POC duration/ or 90 days whichever is less):  21    Visit # Insurance Allowable Auth required?  BOMN []  Yes [x]  No     Latex Allergy:  [x]NO      []YES  Preferred Language for Healthcare:   [x]English       []other:    Functional Outcomes Measure:   Date Assessed: at eval  Test: [address]  Score: 46    Pain level:  0/10  Dizziness level: 4/10     History of Injury:Patient reports onset of dizziness around 3/7/21. Patient states that she was sleeping and woke up to go to the bathroom. Upon waking the whole room was spinning and she could barely get out of the bed. Notes the spinning lasted secs and then she was dizzy with blurred vision for awhile afterwards. Patient notes she saw ENT on Friday who did some treatment to help her in his office. Prior to seeing ENT she continued to have dizziness when rolling in bed, sitting up, and turning head. After seeing ENT on Friday, patient notes dizziness is better but still slightly present. Notices the spinning when rising from laying down, bending over, and when shaking head. SUBJECTIVE:  3/29: Patient reports that she has been feeling better since last visit. Patient states she does not get dizzy as often since treatment. Bending over and rising up are still things that tend to cause her dizziness. 4/2: notes she woke the day after PT really dizzy. Since then her dizziness has calmed down. Not as bad today. 4/6: dizziness is better but still present with bending over and occasionally with sitting up in bed at night. 4/8: about the same as last session; did have a few sharp pain in R ear during the hep yesterday, but not since   4/12: Pt continues to c/o dizziness. Notes it was really bad this morning when she got out of bed this morning. OBJECTIVE:   Postural Control Tests:  Clinical Test of Sensory Interaction for Balance (CTSIB) performed in Romberg stance  CONDITION TIME STRATEGY SWAY    Eyes open, firm surface x30 sec   none    Eyes closed, firm surface x30 sec ankle mild    Eyes open, foam surface x30 sec Hip  mild    Eyes closed, foam surface x10 sec step moderate      Gait: No major gait deviations noted                Oculomotor/Vestibular Examination: Updated 4/12/21     Spontaneous nystagmus:       []? Left             []? Right           [x]? Absent  Gaze-Evoked nystagmus with fixation present:              Primary            []? Present       [x]? Absent              Right                [x]? Present       []? Absent              Left                  [x]? Present       []? Absent  VOR Head Thrust Test:          []? Normal       [x]? Abnormal    Comments: slight corrections noted R  VOR Cancellation:                  [x]? Normal       []? Abnormal    Comments:   Smooth Pursuit:                      []? Normal       [x]? Abnormal    Comments: nystagmus and dizziness and double vision with s/s tracking both L/R  Saccades:                               [x]?  Normal Modalities  Min:                      Other Therapeutic Activities: Pt was educated x10 mins on PT POC, Diagnosis, Prognosis, pathomechanics as well as frequency and duration of scheduling future physical therapy appointments. Time was also taken on this day to answer all patient questions and participation in PT. Reviewed appointment policy in detail with patient and patient verbalized understanding. Home Exercise Program: Patient was instructed in the following for HEP: self-epley maneuver for L side. Patient verbalized/demonstrated understanding and was issued written handout. 3/29/21: Patient issued Piotr Ravens exercises for HEP. Patient verbalized/demonstrated understanding and was issued written handout. 4/12: Issued patient self-epley for side to perform. Patient verbalized understanding and was issued written handout. Therapeutic Exercise and NMR EXR  [] (83206) Provided verbal/tactile cueing for activities related to strengthening, flexibility, endurance, ROM for improvements in LE, proximal hip, and core control with self care, mobility, lifting, ambulation. [x] (39216) Provided verbal/tactile cueing for activities related to improving balance, coordination, kinesthetic sense, posture, motor skill, proprioception  to assist with LE, proximal hip, and core control in self care, mobility, lifting, ambulation and eccentric single leg control.  [address] and Therapeutic Activities:    [] (99850 or 45952) Provided verbal/tactile cueing for activities related to improving balance, coordination, kinesthetic sense, posture, motor skill, proprioception and motor activation to allow for proper function of core, proximal hip and LE with self care and ADLs and functional mobility.   [] (49985) Gait Re-education- Provided training and instruction to the patient for proper LE, core and proximal hip recruitment and positioning and eccentric body weight control with ambulation re-education including up and down stairs     Home Exercise Program:    [] (26981) Reviewed/Progressed HEP activities related to strengthening, flexibility, endurance, ROM of core, proximal hip and LE for functional self-care, mobility, lifting and ambulation/stair navigation   [x] (38121)Reviewed/Progressed HEP activities related to improving balance, coordination, kinesthetic sense, posture, motor skill, proprioception of core, proximal hip and LE for self care, mobility, lifting, and ambulation/stair navigation      Manual Treatments:  PROM / STM / Oscillations-Mobs:  G-I, II, III, IV (PA's, Inf., Post.)  [] (09607) Provided manual therapy to mobilize LE, proximal hip and/or LS spine soft tissue/joints for the purpose of modulating pain, promoting relaxation,  increasing ROM, reducing/eliminating soft tissue swelling/inflammation/restriction, improving soft tissue extensibility and allowing for proper ROM for normal function with self care, mobility, lifting and ambulation. Charges:  Timed Code Treatment Minutes: 40   Total Treatment Minutes: 40      [] EVAL (LOW) 82324 (typically 20 minutes face-to-face)  [] EVAL (MOD) 43213 (typically 30 minutes face-to-face)  [] EVAL (HIGH) 11173 (typically 45 minutes face-to-face)  [] RE-EVAL     [] ZJ(49807) x     [] Dry needle 1 or 2 Muscles (23168)  [x] NMR (17201) x  3   [] Dry needle 3+ Muscles (75155)  [] Manual (82768) x     [] Ultrasound (90314) x  [] TA (91135) x     [] Mech Traction (54574)  [] ES(attended) (24781)     [] ES (un) (54379):   [] Vasopump (86416) [] Ionto (33750)   [] Other:    GOALS:  Patient stated goal: \"no dizziness\"  []? Progressing: []? Met: []? Not Met: []? Adjusted     Therapist goals for Patient:   Short Term Goals: To be achieved in: 2 weeks  1. Independent in HEP and progression per patient tolerance, in order to prevent re-injury. []? Progressing: []? Met: []? Not Met: []? Adjusted  2.  Patient will have a decrease in dizziness/imbalance/symptoms by 40% to facilitate improvement in movement, function, balance and ADLs as indicated by Functional Deficits. []? Progressing: []? Met: []? Not Met: []? Adjusted     Long Term Goals: To be achieved in: at discharge  1. Disability index score of 25% or less for the [facility] AND [place] to assist with reaching prior level of function. []? Progressing: []? Met: []? Not Met: []? Adjusted  2. Patient will demonstrate negative oculomotor special testing/positional testing as indicated by patients Functional Deficits. []? Progressing: []? Met: []? Not Met: []? Adjusted  3. Patient will return to functional activities including rolling over in bed without increased symptoms or restriction. []? Progressing: []? Met: []? Not Met: []? Adjusted  5. Patient will be able to ambulate in community with straight cane without increased symptoms or restriction. []? Progressing: []? Met: []? Not Met: []? Adjusted         ASSESSMENT:  Patient demonstrates down beating nystagmus greater on L than on R. Torsion difficult to assess due to lack of Frenzel lenses. Patient demonstrated decreased nystagmus and vertigo following first R Epley maneuver. Maneuver was repeated 1 more time. No nystagmus or vertigo reported afterwards with L [place]-Hallpike test.  Patient was given self epley maneuver to continue with at home. PT will follow up with pt via phone to discuss resolution of dizziness. If symptoms continue, patient would benefit from returning to ENT for VNG testing. Treatment/Activity Tolerance:  [x] Patient tolerated treatment well [] Patient limited by fatique  [] Patient limited by pain  [] Patient limited by other medical complications  [] Other:     Overall Progression Towards Functional goals/ Treatment Progress Update:  [] Patient is progressing as expected towards functional goals listed. [x] Progression is slowed due to complexities/Impairments listed. [] Progression has been slowed due to co-morbidities.   [] Plan just implemented, too soon to assess goals progression <30days   [] Goals require adjustment due to lack of progress  [] Patient is not progressing as expected and requires additional follow up with physician  [] Other    Prognosis for POC: [x] Good [] Fair  [] Poor    Patient requires continued skilled intervention: [x] Yes  [] No        PLAN: Follow up with patient via phone call about symptoms. If dizziness continues, possibly refer back to ENT for VNG study. [x] Continue per plan of care [] Alter current plan (see comments)  [] Plan of care initiated [] Hold pending MD visit [] Discharge    Electronically signed by: Sukhjinder Castro PT , OMT-C,  [id number]        Note: If patient does not return for scheduled/recommended follow up visits, this note will serve as a discharge from care along with the most recent update on progress.

## 2021-04-14 ENCOUNTER — APPOINTMENT (OUTPATIENT)
Dept: PHYSICAL THERAPY | Age: 76
End: 2021-04-14
Payer: MEDICARE

## 2021-04-19 NOTE — PLAN OF CARE
R  VOR Cancellation:                  [x]? ? Normal       []? ? Abnormal    Comments:   Smooth Pursuit:                      []? ? Normal       [x]? ? Abnormal    Comments: nystagmus and dizziness and double vision with s/s tracking both L/R  Saccades:                               [x]? ? Normal       []? ? Abnormal    Comments: dizzy with looking left   Convergence:                          [x]? ? Normal       []? ? Abnormal    Comments:      Positional Testing -Updated 4/12/21  R [place] maneuver:               Nystagmus:     [x]? ? MIU             []? ? No               [x]? ? Duration:                                       [x]? ? Direction: very mild downbeating nystagmus x 30 sec                    QQRTQAX:            []? ? Yes             [x]? ? ZV               []? ? Duration:   L Hallpike-[place] maneuver:              Nystagmus:     [x]? ? KOX             []? ? No               [x]? ? Duration: 60 sec                                      [x]? ? Direction: down beating nystagmus - torsion difficult to assess, worse on the L>R              Vertigo:            [x]? ? GVZ             []? ? ZT               []? ? Duration:   Supine roll head right:              Nystagmus:     []? ? Yes             [x]? ? NC               []? ? Duration:                                          []? ? Direction:                  QQBSDKV:            []? ? Yes             [x]? ? ZZ               []? ? Duration:    Supine roll head left:              Nystagmus:     []? ? Yes             [x]? ? RR               []? ? Duration:                                          []? ? Direction:                  CDFKYKL:            []? ? Yes             [x]? ? AB               []? ? Duration:      ASSESSMENT: Patient demonstrates down beating nystagmus greater on L than on R. Torsion difficult to assess due to lack of Frenzel lenses. Patient demonstrated decreased nystagmus and vertigo following first R Epley maneuver. Maneuver was repeated 1 more time.   No nystagmus or vertigo reported afterwards with L [place]-Hallpike test.  Patient was given self epley maneuver to continue with at home. PT followed up with patient one week following last visit. Patient reports full resolution of symptoms.      Response to Treatment:   [x]Patient met all goals and has responded well to treatment and will continue HEP   []Patient should continue to improve in reasonable time if they continue HEP   []Patient has plateaued and is no longer responding to skilled PT intervention    []Patient is getting worse and would benefit from return to referring MD   []Patient unable to adhere to initial POC      GOALS:   Patient stated goal: \"no dizziness\"  []? ? Progressing: [x]? ? Met: []?? Not Met: []?? Adjusted     Therapist goals for Patient:   Short Term Goals: To be achieved in: 2 weeks  1. Independent in HEP and progression per patient tolerance, in order to prevent re-injury. []?? Progressing: [x]? ? Met: []?? Not Met: []?? Adjusted  2. Patient will have a decrease in dizziness/imbalance/symptoms by 40% to facilitate improvement in movement, function, balance and ADLs as indicated by Functional Deficits. []?? Progressing: [x]? ? Met: []?? Not Met: []?? Adjusted     Long Term Goals: To be achieved in: at discharge  1. Disability index score of 25% or less for the [address] [place] [address] to assist with reaching prior level of function.   []?? Progressing: [x]? ? Met: []?? Not Met: []?? Adjusted  2. Patient will demonstrate negative oculomotor special testing/positional testing as indicated by patients Functional Deficits. []?? Progressing: [x]? ? Met: []?? Not Met: []?? Adjusted  3. Patient will return to functional activities including rolling over in bed without increased symptoms or restriction.   []?? Progressing: [x]? ? Met: []?? Not Met: []?? Adjusted  5. Patient will be able to ambulate in community with straight cane without increased symptoms or restriction.   []?? Progressing: [x]? ? Met: []?? Not Met: []?? Adjusted

## 2021-07-01 DIAGNOSIS — I10 ESSENTIAL HYPERTENSION, BENIGN: ICD-10-CM

## 2021-07-01 DIAGNOSIS — E53.8 B12 DEFICIENCY: ICD-10-CM

## 2021-07-01 DIAGNOSIS — R53.83 FATIGUE, UNSPECIFIED TYPE: ICD-10-CM

## 2021-07-01 DIAGNOSIS — E03.9 HYPOTHYROIDISM, UNSPECIFIED TYPE: ICD-10-CM

## 2021-07-01 LAB
A/G RATIO: 1.6 (ref 1.1–2.2)
ALBUMIN SERPL-MCNC: 4.2 G/DL (ref 3.4–5)
ALP BLD-CCNC: 115 U/L (ref 40–129)
ALT SERPL-CCNC: 16 U/L (ref 10–40)
ANION GAP SERPL CALCULATED.3IONS-SCNC: 10 MMOL/L (ref 3–16)
AST SERPL-CCNC: 14 U/L (ref 15–37)
BASOPHILS ABSOLUTE: 0 K/UL (ref 0–0.2)
BASOPHILS RELATIVE PERCENT: 0.5 %
BILIRUB SERPL-MCNC: <0.2 MG/DL (ref 0–1)
BUN BLDV-MCNC: 24 MG/DL (ref 7–20)
CALCIUM SERPL-MCNC: 9.9 MG/DL (ref 8.3–10.6)
CHLORIDE BLD-SCNC: 105 MMOL/L (ref 99–110)
CO2: 27 MMOL/L (ref 21–32)
CREAT SERPL-MCNC: 0.8 MG/DL (ref 0.6–1.2)
EOSINOPHILS ABSOLUTE: 0 K/UL (ref 0–0.6)
EOSINOPHILS RELATIVE PERCENT: 0.5 %
GFR AFRICAN AMERICAN: >60
GFR NON-AFRICAN AMERICAN: >60
GLOBULIN: 2.7 G/DL
GLUCOSE BLD-MCNC: 108 MG/DL (ref 70–99)
HCT VFR BLD CALC: 39.1 % (ref 36–48)
HEMOGLOBIN: 13.2 G/DL (ref 12–16)
LYMPHOCYTES ABSOLUTE: 1.2 K/UL (ref 1–5.1)
LYMPHOCYTES RELATIVE PERCENT: 19.9 %
MAGNESIUM: 1.7 MG/DL (ref 1.8–2.4)
MCH RBC QN AUTO: 30.1 PG (ref 26–34)
MCHC RBC AUTO-ENTMCNC: 33.9 G/DL (ref 31–36)
MCV RBC AUTO: 89 FL (ref 80–100)
MONOCYTES ABSOLUTE: 0.4 K/UL (ref 0–1.3)
MONOCYTES RELATIVE PERCENT: 5.7 %
NEUTROPHILS ABSOLUTE: 4.5 K/UL (ref 1.7–7.7)
NEUTROPHILS RELATIVE PERCENT: 73.4 %
PDW BLD-RTO: 13.3 % (ref 12.4–15.4)
PLATELET # BLD: 197 K/UL (ref 135–450)
PMV BLD AUTO: 7.9 FL (ref 5–10.5)
POTASSIUM SERPL-SCNC: 4.4 MMOL/L (ref 3.5–5.1)
RBC # BLD: 4.4 M/UL (ref 4–5.2)
SODIUM BLD-SCNC: 142 MMOL/L (ref 136–145)
TOTAL PROTEIN: 6.9 G/DL (ref 6.4–8.2)
TSH REFLEX: 1.94 UIU/ML (ref 0.27–4.2)
VITAMIN B-12: 272 PG/ML (ref 211–911)
WBC # BLD: 6.1 K/UL (ref 4–11)

## 2021-07-02 DIAGNOSIS — E53.8 B12 DEFICIENCY: ICD-10-CM

## 2021-07-08 LAB
REASON FOR REJECTION: NORMAL
REJECTED TEST: NORMAL

## 2021-07-13 ENCOUNTER — OFFICE VISIT (OUTPATIENT)
Dept: SURGERY | Age: 76
End: 2021-07-13
Payer: MEDICARE

## 2021-07-13 VITALS
BODY MASS INDEX: 41.95 KG/M2 | SYSTOLIC BLOOD PRESSURE: 132 MMHG | WEIGHT: 222 LBS | DIASTOLIC BLOOD PRESSURE: 72 MMHG | HEART RATE: 61 BPM

## 2021-07-13 DIAGNOSIS — M79.89 LEG SWELLING: Primary | ICD-10-CM

## 2021-07-13 DIAGNOSIS — E05.90 PRETIBIAL MYXEDEMA: ICD-10-CM

## 2021-07-13 DIAGNOSIS — I10 ESSENTIAL HYPERTENSION, BENIGN: ICD-10-CM

## 2021-07-13 DIAGNOSIS — I89.0 LYMPHEDEMA OF BOTH LOWER EXTREMITIES: ICD-10-CM

## 2021-07-13 DIAGNOSIS — E03.9 HYPOTHYROIDISM, UNSPECIFIED TYPE: ICD-10-CM

## 2021-07-13 LAB
MAGNESIUM: 2.1 MG/DL (ref 1.8–2.4)
T4 FREE: 1.5 NG/DL (ref 0.9–1.8)
TSH REFLEX: 3.49 UIU/ML (ref 0.27–4.2)

## 2021-07-13 PROCEDURE — 1123F ACP DISCUSS/DSCN MKR DOCD: CPT | Performed by: SURGERY

## 2021-07-13 PROCEDURE — G8427 DOCREV CUR MEDS BY ELIG CLIN: HCPCS | Performed by: SURGERY

## 2021-07-13 PROCEDURE — G8417 CALC BMI ABV UP PARAM F/U: HCPCS | Performed by: SURGERY

## 2021-07-13 PROCEDURE — 4040F PNEUMOC VAC/ADMIN/RCVD: CPT | Performed by: SURGERY

## 2021-07-13 PROCEDURE — 99214 OFFICE O/P EST MOD 30 MIN: CPT | Performed by: SURGERY

## 2021-07-13 PROCEDURE — G8399 PT W/DXA RESULTS DOCUMENT: HCPCS | Performed by: SURGERY

## 2021-07-13 PROCEDURE — 1036F TOBACCO NON-USER: CPT | Performed by: SURGERY

## 2021-07-13 PROCEDURE — 1090F PRES/ABSN URINE INCON ASSESS: CPT | Performed by: SURGERY

## 2021-07-13 ASSESSMENT — ENCOUNTER SYMPTOMS
EYES NEGATIVE: 1
GASTROINTESTINAL NEGATIVE: 1
ALLERGIC/IMMUNOLOGIC NEGATIVE: 1
RESPIRATORY NEGATIVE: 1

## 2021-07-13 NOTE — PROGRESS NOTES
History     Socioeconomic History    Marital status:      Spouse name: Not on file    Number of children: 3    Years of education: Not on file    Highest education level: Not on file   Occupational History    Not on file   Tobacco Use    Smoking status: Former Smoker     Packs/day: 0.25     Years: 3.00     Pack years: 0.75     Types: Cigarettes     Start date: 10/21/1961     Quit date: 10/21/1964     Years since quittin.7    Smokeless tobacco: Never Used   Vaping Use    Vaping Use: Never used   Substance and Sexual Activity    Alcohol use: Yes     Comment: SOCIALLY-wine    Drug use: No    Sexual activity: Never   Other Topics Concern    Not on file   Social History Narrative    Not on file     Social Determinants of Health     Financial Resource Strain: Low Risk     Difficulty of Paying Living Expenses: Not hard at all   Food Insecurity: No Food Insecurity    Worried About [employer] in the Last Year: Never true    [facility] in the Last Year: Never true   Transportation Needs:     Lack of Transportation (Medical):      Lack of Transportation (Non-Medical):    Physical Activity:     Days of Exercise per Week:     Minutes of Exercise per Session:    Stress:     Feeling of Stress :    Social Connections:     Frequency of Communication with Friends and Family:     Frequency of Social Gatherings with Friends and Family:     Attends [employer] Services:     Active Member of Clubs or Organizations:     Attends Club or Organization Meetings:     Marital Status:    Intimate Partner Violence:     Fear of Current or Ex-Partner:     Emotionally Abused:     Physically Abused:     Sexually Abused:        Family History   Problem Relation Age of Onset    Hypertension Mother     Colon Cancer Father     Diabetes Sister          Current Outpatient Medications:     ezetimibe (ZETIA) 10 MG tablet, TAKE 1 TABLET DAILY, Disp: 90 tablet, Rfl: 3    amLODIPine (NORVASC) 2.5 MG tablet, TAKE 1 TABLET BY MOUTH DAILY, Disp: 90 tablet, Rfl: 3    telmisartan-hydrochlorothiazide (MICARDIS HCT) 80-25 MG per tablet, TAKE 1 TABLET BY MOUTH DAILY, Disp: 90 tablet, Rfl: 3    loratadine (CLARITIN) 10 MG tablet, Take 10 mg by mouth daily, Disp: , Rfl:     pravastatin (PRAVACHOL) 20 MG tablet, Take 1 tablet by mouth nightly, Disp: 90 tablet, Rfl: 3    nebivolol (BYSTOLIC) 10 MG tablet, TAKE 1 TABLET DAILY, Disp: 90 tablet, Rfl: 3    dicyclomine (BENTYL) 20 MG tablet, Take 1 tablet by mouth every 6 hours, Disp: 120 tablet, Rfl: 1    levothyroxine (SYNTHROID) 125 MCG tablet, TAKE 1 TABLET BY MOUTH DAILY, Disp: 90 tablet, Rfl: 3    Multiple Vitamins-Minerals (THERAPEUTIC MULTIVITAMIN-MINERALS) tablet, Take 1 tablet by mouth daily, Disp: , Rfl:     vitamin C (ASCORBIC ACID) 500 MG tablet, Take 500 mg by mouth daily, Disp: , Rfl:     Cholecalciferol (VITAMIN D3) 125 MCG (5000 UT) TABS, Take 50 each by mouth, Disp: , Rfl:     MAGNESIUM PO, Take by mouth, Disp: , Rfl:     mometasone-formoterol (DULERA) 200-5 MCG/ACT inhaler, INHALE 2 PUFFS INTO THE LUNGS TWICE DAILY (Patient taking differently: Only using once or twice weekly), Disp: 1 Inhaler, Rfl: 3    PROAIR  (90 Base) MCG/ACT inhaler, USE 2 INHALATIONS ORALLY   INTO THE LUNGS EVERY 6     HOURS AS NEEDEDFOR        WHEEZING, Disp: 8.5 g, Rfl: 4    cyanocobalamin ([employer] VITAMIN B12) 1000 MCG tablet, Take 1 tablet by mouth daily, Disp: 30 tablet, Rfl: 3    aspirin 81 MG tablet, Take 81 mg by mouth daily. , Disp: , Rfl:     Montelukast sodium, Sulfa antibiotics, and Strawberry extract    Vitals:    07/13/21 1018   BP: 132/72   Pulse: 61   Weight: 222 lb (100.7 kg)       Orders Only on 07/02/2021   Component Date Value Ref Range Status    Rejected Test 07/08/2021 99,431   Final    MMA Serum/Plasma, Vitamin B12 Deficiency    Reason for Rejection 07/08/2021 see below   Final    Comment: Unable to perform testing; specimen special handling  requirements not followed. To perform testing the  specimen will need to be recollected. Sp handle  Add on test, both SST tubes were pulled from archive trays but specimen  was not sent to [facility]         Review of Systems   Constitutional: Positive for fatigue. Negative for activity change, appetite change, chills, diaphoresis, fever and unexpected weight change. HENT: Negative. Eyes: Negative. Respiratory: Negative. Cardiovascular: Positive for leg swelling. Negative for chest pain and palpitations. Gastrointestinal: Negative. Endocrine: Negative. Genitourinary: Negative. Musculoskeletal: Negative. Skin: Negative. Allergic/Immunologic: Negative. Neurological: Negative. Hematological: Negative. Psychiatric/Behavioral: Negative. All other systems reviewed and are negative. Physical Exam  Vitals (Obese) and nursing note reviewed. Constitutional:       General: She is not in acute distress. Appearance: Normal appearance. She is well-developed. She is obese. She is not ill-appearing or toxic-appearing. HENT:      Head: Normocephalic and atraumatic. Right Ear: External ear normal.      Mouth/Throat:      Pharynx: No oropharyngeal exudate. Eyes:      General: No scleral icterus. Conjunctiva/sclera: Conjunctivae normal.      Pupils: Pupils are equal, round, and reactive to light. Neck:      Thyroid: No thyromegaly. Vascular: Normal carotid pulses. No carotid bruit or JVD. Trachea: No tracheal deviation. Cardiovascular:      Rate and Rhythm: Normal rate and regular rhythm. Pulses:           Carotid pulses are 2+ on the right side and 2+ on the left side. Radial pulses are 2+ on the right side and 2+ on the left side. Femoral pulses are 2+ on the right side and 2+ on the left side. Dorsalis pedis pulses are 1+ on the right side and 2+ on the left side.         Posterior tibial pulses are 2+ on the right side and 2+ on the left side. Heart sounds: Normal heart sounds and S1 normal. No murmur heard. Comments:   MEASUREMENTS 21:    RIGHT ANKLE: 23.7 cm  RIGHT CALF: 47.3 cm     LEFT ANKLE: 23.5 cm   LEFT CALF: 48.5 cm     MEASUREMENTS 2021:    RIGHT ANKLE: 23.0 cm   RIGHT CALF: 46.5 cm     LEFT ANKLE: 23.4 cm   LEFT CALF: 47.3 cm     MEASUREMENTS 2020:    RIGHT ANKLE: 22.9 cm  RIGHT CALF: 47.2 cm     LEFT ANKLE: 23.5 cm   LEFT CALF: 48.2 cm     MEASUREMENTS 2020:    RIGHT ANKLE: 22.8 cm  RIGHT CALF: 45.6 cm     LEFT ANKLE:  23.4 cm   LEFT CALF: 45.3 cm       MEASUREMENTS 1/3/2020:    RIGHT ANKLE: 22.8 cm   RIGHT CALF: 46.4 cm     LEFT ANKLE: 23.3 cm   LEFT CALF: 47.7 cm     No abdominal bruits    Pulmonary:      Effort: Pulmonary effort is normal. No tachypnea, accessory muscle usage or respiratory distress. Breath sounds: Normal breath sounds. No stridor. No wheezing or rales. Abdominal:      General: Bowel sounds are normal. There is no distension. Palpations: Abdomen is soft. There is no mass. Tenderness: There is no abdominal tenderness. There is no guarding or rebound. Hernia: No hernia is present. There is no hernia in the ventral area or left inguinal area. Genitourinary:     Comments: Rectal exam/stool guaiac not indicated. Musculoskeletal:         General: No tenderness. Normal range of motion. Right shoulder: No deformity. Normal range of motion. Cervical back: Normal range of motion and neck supple. No edema or erythema. Right lower le+ Pitting Edema present. Left lower le+ Pitting Edema present. Lymphadenopathy:      Head:      Right side of head: No submandibular, preauricular, posterior auricular or occipital adenopathy. Left side of head: No submandibular, preauricular, posterior auricular or occipital adenopathy. Cervical: No cervical adenopathy.       Right cervical: No superficial, deep or posterior cervical adenopathy. Left cervical: No superficial, deep or posterior cervical adenopathy. Upper Body:      Right upper body: No supraclavicular or pectoral adenopathy. Left upper body: No supraclavicular or pectoral adenopathy. Skin:     General: Skin is warm and dry. Coloration: Skin is not pale. Findings: No bruising, erythema, laceration, lesion or rash. Neurological:      Mental Status: She is alert and oriented to person, place, and time. Cranial Nerves: No cranial nerve deficit. Sensory: No sensory deficit. Motor: No atrophy or abnormal muscle tone. Coordination: Coordination normal.      Gait: Gait normal.      Deep Tendon Reflexes: Reflexes are normal and symmetric. Psychiatric:         Speech: Speech normal.         Behavior: Behavior normal.         Thought Content: Thought content normal.         Judgment: Judgment normal.       Mrs. Terry Wilde had a stroke in 2019. She denies any further stroke symptoms. ASSESSMENT:    Problem List Items Addressed This Visit     Pretibial myxedema    Lymphedema of both lower extremities      Other Visit Diagnoses     Leg swelling    -  Primary          PLAN:    Return in six months for a leg and stocking check. Signs/Symptoms of Stroke:  - Watch for one eye going dark like a shade was pulled down over it. - Watch for one side of the body or face not working.  - Difficulty with speech such as slurring your words  - Difficulty finding the right words, i.e. Calling an object by the wrong name when you know the real name. If you have any of these symptoms, do not call us, or your family doctor. Call 911, and go immediately to the hospital. You have a 4-6 hour window from the point when symptoms start to get to the hospital, get a CT scan done, and initiate clot dissolving drugs.       Donna Rizo MA am scribing for and in the presence of Dain Benavides MD on this date of 07/13/21    I Joo Moya MD personally performed the services described in this documentation as scribed by the Medical Assistant Master Lucero May in my presence and it is both accurate and complete.         Electronically signed by Ti Mcmillan MD on 7/13/2021 at 12:33 PM

## 2021-07-13 NOTE — PATIENT INSTRUCTIONS
Return in six months for a leg and stocking check. Signs/Symptoms of Stroke:  - Watch for one eye going dark like a shade was pulled down over it. - Watch for one side of the body or face not working.  - Difficulty with speech such as slurring your words  - Difficulty finding the right words, i.e. Calling an object by the wrong name when you know the real name. If you have any of these symptoms, do not call us, or your family doctor. Call 911, and go immediately to the hospital. You have a 4-6 hour window from the point when symptoms start to get to the hospital, get a CT scan done, and initiate clot dissolving drugs.

## 2021-07-20 DIAGNOSIS — Z11.59 NEED FOR HEPATITIS C SCREENING TEST: ICD-10-CM

## 2021-07-20 DIAGNOSIS — E78.1 PURE HYPERTRIGLYCERIDEMIA: ICD-10-CM

## 2021-07-20 DIAGNOSIS — E03.9 HYPOTHYROIDISM, UNSPECIFIED TYPE: ICD-10-CM

## 2021-07-20 DIAGNOSIS — I10 ESSENTIAL HYPERTENSION, BENIGN: ICD-10-CM

## 2021-07-20 LAB
A/G RATIO: 1.6 (ref 1.1–2.2)
ALBUMIN SERPL-MCNC: 4.1 G/DL (ref 3.4–5)
ALP BLD-CCNC: 119 U/L (ref 40–129)
ALT SERPL-CCNC: 18 U/L (ref 10–40)
ANION GAP SERPL CALCULATED.3IONS-SCNC: 16 MMOL/L (ref 3–16)
AST SERPL-CCNC: 13 U/L (ref 15–37)
BILIRUB SERPL-MCNC: 0.3 MG/DL (ref 0–1)
BUN BLDV-MCNC: 21 MG/DL (ref 7–20)
CALCIUM SERPL-MCNC: 9.5 MG/DL (ref 8.3–10.6)
CHLORIDE BLD-SCNC: 103 MMOL/L (ref 99–110)
CO2: 24 MMOL/L (ref 21–32)
CREAT SERPL-MCNC: 0.8 MG/DL (ref 0.6–1.2)
GFR AFRICAN AMERICAN: >60
GFR NON-AFRICAN AMERICAN: >60
GLOBULIN: 2.5 G/DL
GLUCOSE BLD-MCNC: 96 MG/DL (ref 70–99)
HEPATITIS C ANTIBODY INTERPRETATION: NORMAL
POTASSIUM SERPL-SCNC: 4.2 MMOL/L (ref 3.5–5.1)
SODIUM BLD-SCNC: 143 MMOL/L (ref 136–145)
TOTAL PROTEIN: 6.6 G/DL (ref 6.4–8.2)
TSH REFLEX: 1.55 UIU/ML (ref 0.27–4.2)

## 2021-08-13 ENCOUNTER — APPOINTMENT (OUTPATIENT)
Dept: GENERAL RADIOLOGY | Age: 76
DRG: 440 | End: 2021-08-13
Payer: MEDICARE

## 2021-08-13 ENCOUNTER — APPOINTMENT (OUTPATIENT)
Dept: CT IMAGING | Age: 76
DRG: 440 | End: 2021-08-13
Payer: MEDICARE

## 2021-08-13 ENCOUNTER — HOSPITAL ENCOUNTER (INPATIENT)
Age: 76
LOS: 3 days | Discharge: HOME | DRG: 440 | End: 2021-08-17
Attending: INTERNAL MEDICINE | Admitting: INTERNAL MEDICINE
Payer: MEDICARE

## 2021-08-13 DIAGNOSIS — R74.01 TRANSAMINITIS: ICD-10-CM

## 2021-08-13 DIAGNOSIS — R10.10 PAIN OF UPPER ABDOMEN: ICD-10-CM

## 2021-08-13 DIAGNOSIS — K85.90 ACUTE PANCREATITIS, UNSPECIFIED COMPLICATION STATUS, UNSPECIFIED PANCREATITIS TYPE: Primary | ICD-10-CM

## 2021-08-13 LAB
A/G RATIO: 1.4 (ref 1.1–2.2)
ALBUMIN SERPL-MCNC: 3.9 G/DL (ref 3.4–5)
ALP BLD-CCNC: 338 U/L (ref 40–129)
ALT SERPL-CCNC: 445 U/L (ref 10–40)
ANION GAP SERPL CALCULATED.3IONS-SCNC: 13 MMOL/L (ref 3–16)
AST SERPL-CCNC: 785 U/L (ref 15–37)
BACTERIA: ABNORMAL /HPF
BASOPHILS ABSOLUTE: 0 K/UL (ref 0–0.2)
BASOPHILS RELATIVE PERCENT: 0.1 %
BILIRUB SERPL-MCNC: 1.1 MG/DL (ref 0–1)
BILIRUBIN URINE: NEGATIVE
BLOOD, URINE: NEGATIVE
BUN BLDV-MCNC: 25 MG/DL (ref 7–20)
CALCIUM SERPL-MCNC: 8.9 MG/DL (ref 8.3–10.6)
CHLORIDE BLD-SCNC: 92 MMOL/L (ref 99–110)
CLARITY: CLEAR
CO2: 25 MMOL/L (ref 21–32)
COLOR: YELLOW
CREAT SERPL-MCNC: 0.8 MG/DL (ref 0.6–1.2)
EOSINOPHILS ABSOLUTE: 0 K/UL (ref 0–0.6)
EOSINOPHILS RELATIVE PERCENT: 0.3 %
EPITHELIAL CELLS, UA: 0 /HPF (ref 0–5)
GFR AFRICAN AMERICAN: >60
GFR NON-AFRICAN AMERICAN: >60
GLOBULIN: 2.7 G/DL
GLUCOSE BLD-MCNC: 147 MG/DL (ref 70–99)
GLUCOSE URINE: NEGATIVE MG/DL
HCT VFR BLD CALC: 37 % (ref 36–48)
HEMOGLOBIN: 12.7 G/DL (ref 12–16)
HYALINE CASTS: 0 /LPF (ref 0–8)
KETONES, URINE: NEGATIVE MG/DL
LEUKOCYTE ESTERASE, URINE: ABNORMAL
LIPASE: >3000 U/L (ref 13–60)
LYMPHOCYTES ABSOLUTE: 0.3 K/UL (ref 1–5.1)
LYMPHOCYTES RELATIVE PERCENT: 7 %
MCH RBC QN AUTO: 30.1 PG (ref 26–34)
MCHC RBC AUTO-ENTMCNC: 34.4 G/DL (ref 31–36)
MCV RBC AUTO: 87.4 FL (ref 80–100)
MICROSCOPIC EXAMINATION: YES
MONOCYTES ABSOLUTE: 0 K/UL (ref 0–1.3)
MONOCYTES RELATIVE PERCENT: 0.7 %
NEUTROPHILS ABSOLUTE: 4 K/UL (ref 1.7–7.7)
NEUTROPHILS RELATIVE PERCENT: 91.9 %
NITRITE, URINE: NEGATIVE
PDW BLD-RTO: 12.6 % (ref 12.4–15.4)
PH UA: 6 (ref 5–8)
PLATELET # BLD: 137 K/UL (ref 135–450)
PMV BLD AUTO: 6.7 FL (ref 5–10.5)
POTASSIUM REFLEX MAGNESIUM: 3.6 MMOL/L (ref 3.5–5.1)
PRO-BNP: 175 PG/ML (ref 0–449)
PROTEIN UA: NEGATIVE MG/DL
RBC # BLD: 4.24 M/UL (ref 4–5.2)
RBC UA: 1 /HPF (ref 0–4)
SODIUM BLD-SCNC: 130 MMOL/L (ref 136–145)
SPECIFIC GRAVITY UA: 1.01 (ref 1–1.03)
TOTAL PROTEIN: 6.6 G/DL (ref 6.4–8.2)
TROPONIN: <0.01 NG/ML
URINE REFLEX TO CULTURE: ABNORMAL
URINE TYPE: ABNORMAL
UROBILINOGEN, URINE: 1 E.U./DL
WBC # BLD: 4.3 K/UL (ref 4–11)
WBC UA: 7 /HPF (ref 0–5)

## 2021-08-13 PROCEDURE — 71045 X-RAY EXAM CHEST 1 VIEW: CPT

## 2021-08-13 PROCEDURE — 2580000003 HC RX 258: Performed by: NURSE PRACTITIONER

## 2021-08-13 PROCEDURE — 96374 THER/PROPH/DIAG INJ IV PUSH: CPT

## 2021-08-13 PROCEDURE — 6370000000 HC RX 637 (ALT 250 FOR IP): Performed by: NURSE PRACTITIONER

## 2021-08-13 PROCEDURE — 93005 ELECTROCARDIOGRAM TRACING: CPT | Performed by: INTERNAL MEDICINE

## 2021-08-13 PROCEDURE — 83690 ASSAY OF LIPASE: CPT

## 2021-08-13 PROCEDURE — 85025 COMPLETE CBC W/AUTO DIFF WBC: CPT

## 2021-08-13 PROCEDURE — 80053 COMPREHEN METABOLIC PANEL: CPT

## 2021-08-13 PROCEDURE — 96375 TX/PRO/DX INJ NEW DRUG ADDON: CPT

## 2021-08-13 PROCEDURE — 99285 EMERGENCY DEPT VISIT HI MDM: CPT

## 2021-08-13 PROCEDURE — 2500000003 HC RX 250 WO HCPCS: Performed by: NURSE PRACTITIONER

## 2021-08-13 PROCEDURE — 82077 ASSAY SPEC XCP UR&BREATH IA: CPT

## 2021-08-13 PROCEDURE — 6360000004 HC RX CONTRAST MEDICATION: Performed by: NURSE PRACTITIONER

## 2021-08-13 PROCEDURE — 81001 URINALYSIS AUTO W/SCOPE: CPT

## 2021-08-13 PROCEDURE — 84484 ASSAY OF TROPONIN QUANT: CPT

## 2021-08-13 PROCEDURE — 6360000002 HC RX W HCPCS: Performed by: NURSE PRACTITIONER

## 2021-08-13 PROCEDURE — 83880 ASSAY OF NATRIURETIC PEPTIDE: CPT

## 2021-08-13 PROCEDURE — 74177 CT ABD & PELVIS W/CONTRAST: CPT

## 2021-08-13 RX ORDER — ONDANSETRON 4 MG/1
4 TABLET, ORALLY DISINTEGRATING ORAL EVERY 8 HOURS PRN
COMMUNITY
End: 2021-08-26 | Stop reason: CLARIF

## 2021-08-13 RX ORDER — ONDANSETRON 2 MG/ML
4 INJECTION INTRAMUSCULAR; INTRAVENOUS ONCE
Status: COMPLETED | OUTPATIENT
Start: 2021-08-13 | End: 2021-08-14

## 2021-08-13 RX ORDER — 0.9 % SODIUM CHLORIDE 0.9 %
1000 INTRAVENOUS SOLUTION INTRAVENOUS ONCE
Status: COMPLETED | OUTPATIENT
Start: 2021-08-13 | End: 2021-08-14

## 2021-08-13 RX ORDER — ONDANSETRON 2 MG/ML
4 INJECTION INTRAMUSCULAR; INTRAVENOUS ONCE
Status: COMPLETED | OUTPATIENT
Start: 2021-08-13 | End: 2021-08-13

## 2021-08-13 RX ORDER — ASPIRIN 81 MG/1
324 TABLET, CHEWABLE ORAL ONCE
Status: COMPLETED | OUTPATIENT
Start: 2021-08-13 | End: 2021-08-13

## 2021-08-13 RX ORDER — MORPHINE SULFATE 2 MG/ML
2 INJECTION, SOLUTION INTRAMUSCULAR; INTRAVENOUS
Status: DISCONTINUED | OUTPATIENT
Start: 2021-08-13 | End: 2021-08-14

## 2021-08-13 RX ADMIN — ONDANSETRON 4 MG: 2 INJECTION INTRAMUSCULAR; INTRAVENOUS at 21:57

## 2021-08-13 RX ADMIN — MORPHINE SULFATE 2 MG: 2 INJECTION, SOLUTION INTRAMUSCULAR; INTRAVENOUS at 21:59

## 2021-08-13 RX ADMIN — ASPIRIN 324 MG: 81 TABLET, CHEWABLE ORAL at 21:52

## 2021-08-13 RX ADMIN — FAMOTIDINE 20 MG: 10 INJECTION, SOLUTION INTRAVENOUS at 22:05

## 2021-08-13 RX ADMIN — IOPAMIDOL 75 ML: 755 INJECTION, SOLUTION INTRAVENOUS at 22:49

## 2021-08-13 RX ADMIN — SODIUM CHLORIDE 1000 ML: 9 INJECTION, SOLUTION INTRAVENOUS at 21:54

## 2021-08-13 ASSESSMENT — PAIN SCALES - GENERAL
PAINLEVEL_OUTOF10: 5
PAINLEVEL_OUTOF10: 6
PAINLEVEL_OUTOF10: 5

## 2021-08-14 ENCOUNTER — APPOINTMENT (OUTPATIENT)
Dept: MRI IMAGING | Age: 76
DRG: 440 | End: 2021-08-14
Payer: MEDICARE

## 2021-08-14 PROBLEM — K85.90 PANCREATITIS, UNSPECIFIED PANCREATITIS TYPE: Status: ACTIVE | Noted: 2021-08-14

## 2021-08-14 LAB
A/G RATIO: 1.3 (ref 1.1–2.2)
ALBUMIN SERPL-MCNC: 3.1 G/DL (ref 3.4–5)
ALBUMIN SERPL-MCNC: 3.2 G/DL (ref 3.4–5)
ALP BLD-CCNC: 328 U/L (ref 40–129)
ALT SERPL-CCNC: 538 U/L (ref 10–40)
ANION GAP SERPL CALCULATED.3IONS-SCNC: 13 MMOL/L (ref 3–16)
ANION GAP SERPL CALCULATED.3IONS-SCNC: 13 MMOL/L (ref 3–16)
AST SERPL-CCNC: 560 U/L (ref 15–37)
BASOPHILS ABSOLUTE: 0 K/UL (ref 0–0.2)
BASOPHILS RELATIVE PERCENT: 0.3 %
BILIRUB SERPL-MCNC: 1.8 MG/DL (ref 0–1)
BUN BLDV-MCNC: 17 MG/DL (ref 7–20)
BUN BLDV-MCNC: 21 MG/DL (ref 7–20)
CALCIUM SERPL-MCNC: 8.4 MG/DL (ref 8.3–10.6)
CALCIUM SERPL-MCNC: 8.5 MG/DL (ref 8.3–10.6)
CHLORIDE BLD-SCNC: 101 MMOL/L (ref 99–110)
CHLORIDE BLD-SCNC: 98 MMOL/L (ref 99–110)
CHOLESTEROL, TOTAL: 102 MG/DL (ref 0–199)
CO2: 21 MMOL/L (ref 21–32)
CO2: 22 MMOL/L (ref 21–32)
CREAT SERPL-MCNC: 0.9 MG/DL (ref 0.6–1.2)
CREAT SERPL-MCNC: 1 MG/DL (ref 0.6–1.2)
EKG ATRIAL RATE: 89 BPM
EKG DIAGNOSIS: NORMAL
EKG P AXIS: 58 DEGREES
EKG P-R INTERVAL: 182 MS
EKG Q-T INTERVAL: 366 MS
EKG QRS DURATION: 88 MS
EKG QTC CALCULATION (BAZETT): 445 MS
EKG R AXIS: 49 DEGREES
EKG T AXIS: 19 DEGREES
EKG VENTRICULAR RATE: 89 BPM
EOSINOPHILS ABSOLUTE: 0 K/UL (ref 0–0.6)
EOSINOPHILS RELATIVE PERCENT: 0.1 %
ETHANOL: NORMAL MG/DL (ref 0–0.08)
GFR AFRICAN AMERICAN: >60
GFR AFRICAN AMERICAN: >60
GFR NON-AFRICAN AMERICAN: 54
GFR NON-AFRICAN AMERICAN: >60
GLOBULIN: 2.5 G/DL
GLUCOSE BLD-MCNC: 116 MG/DL (ref 70–99)
GLUCOSE BLD-MCNC: 128 MG/DL (ref 70–99)
HCT VFR BLD CALC: 34.6 % (ref 36–48)
HDLC SERPL-MCNC: 59 MG/DL (ref 40–60)
HEMOGLOBIN: 11.7 G/DL (ref 12–16)
LDL CHOLESTEROL CALCULATED: 37 MG/DL
LYMPHOCYTES ABSOLUTE: 0.2 K/UL (ref 1–5.1)
LYMPHOCYTES RELATIVE PERCENT: 1.6 %
MAGNESIUM: 1.1 MG/DL (ref 1.8–2.4)
MCH RBC QN AUTO: 29.5 PG (ref 26–34)
MCHC RBC AUTO-ENTMCNC: 33.8 G/DL (ref 31–36)
MCV RBC AUTO: 87.2 FL (ref 80–100)
MONOCYTES ABSOLUTE: 0.4 K/UL (ref 0–1.3)
MONOCYTES RELATIVE PERCENT: 3.1 %
NEUTROPHILS ABSOLUTE: 11.5 K/UL (ref 1.7–7.7)
NEUTROPHILS RELATIVE PERCENT: 94.9 %
PDW BLD-RTO: 12.7 % (ref 12.4–15.4)
PHOSPHORUS: 3 MG/DL (ref 2.5–4.9)
PLATELET # BLD: 145 K/UL (ref 135–450)
PMV BLD AUTO: 7.4 FL (ref 5–10.5)
POTASSIUM REFLEX MAGNESIUM: 2.7 MMOL/L (ref 3.5–5.1)
POTASSIUM SERPL-SCNC: 3.7 MMOL/L (ref 3.5–5.1)
RBC # BLD: 3.96 M/UL (ref 4–5.2)
SODIUM BLD-SCNC: 133 MMOL/L (ref 136–145)
SODIUM BLD-SCNC: 135 MMOL/L (ref 136–145)
TOTAL PROTEIN: 5.7 G/DL (ref 6.4–8.2)
TRIGL SERPL-MCNC: 32 MG/DL (ref 0–150)
VLDLC SERPL CALC-MCNC: 6 MG/DL
WBC # BLD: 12.1 K/UL (ref 4–11)

## 2021-08-14 PROCEDURE — 2060000000 HC ICU INTERMEDIATE R&B

## 2021-08-14 PROCEDURE — 85025 COMPLETE CBC W/AUTO DIFF WBC: CPT

## 2021-08-14 PROCEDURE — 6360000002 HC RX W HCPCS: Performed by: NURSE PRACTITIONER

## 2021-08-14 PROCEDURE — 93010 ELECTROCARDIOGRAM REPORT: CPT | Performed by: INTERNAL MEDICINE

## 2021-08-14 PROCEDURE — 6370000000 HC RX 637 (ALT 250 FOR IP): Performed by: INTERNAL MEDICINE

## 2021-08-14 PROCEDURE — 96376 TX/PRO/DX INJ SAME DRUG ADON: CPT

## 2021-08-14 PROCEDURE — 6360000002 HC RX W HCPCS: Performed by: INTERNAL MEDICINE

## 2021-08-14 PROCEDURE — 74183 MRI ABD W/O CNTR FLWD CNTR: CPT

## 2021-08-14 PROCEDURE — 83735 ASSAY OF MAGNESIUM: CPT

## 2021-08-14 PROCEDURE — 80053 COMPREHEN METABOLIC PANEL: CPT

## 2021-08-14 PROCEDURE — 2580000003 HC RX 258: Performed by: INTERNAL MEDICINE

## 2021-08-14 PROCEDURE — 80061 LIPID PANEL: CPT

## 2021-08-14 PROCEDURE — 2580000003 HC RX 258: Performed by: NURSE PRACTITIONER

## 2021-08-14 PROCEDURE — 36415 COLL VENOUS BLD VENIPUNCTURE: CPT

## 2021-08-14 PROCEDURE — A9577 INJ MULTIHANCE: HCPCS | Performed by: INTERNAL MEDICINE

## 2021-08-14 PROCEDURE — 6360000004 HC RX CONTRAST MEDICATION: Performed by: INTERNAL MEDICINE

## 2021-08-14 RX ORDER — AMLODIPINE BESYLATE 5 MG/1
2.5 TABLET ORAL DAILY
Status: DISCONTINUED | OUTPATIENT
Start: 2021-08-14 | End: 2021-08-17 | Stop reason: HOSPADM

## 2021-08-14 RX ORDER — ONDANSETRON 2 MG/ML
4 INJECTION INTRAMUSCULAR; INTRAVENOUS EVERY 4 HOURS PRN
Status: DISCONTINUED | OUTPATIENT
Start: 2021-08-14 | End: 2021-08-17 | Stop reason: HOSPADM

## 2021-08-14 RX ORDER — SODIUM CHLORIDE 9 MG/ML
INJECTION, SOLUTION INTRAVENOUS CONTINUOUS
Status: DISCONTINUED | OUTPATIENT
Start: 2021-08-14 | End: 2021-08-15

## 2021-08-14 RX ORDER — ALBUTEROL SULFATE 90 UG/1
2 AEROSOL, METERED RESPIRATORY (INHALATION) EVERY 6 HOURS PRN
Status: DISCONTINUED | OUTPATIENT
Start: 2021-08-14 | End: 2021-08-17 | Stop reason: HOSPADM

## 2021-08-14 RX ORDER — ACETAMINOPHEN 325 MG/1
650 TABLET ORAL EVERY 4 HOURS PRN
Status: DISCONTINUED | OUTPATIENT
Start: 2021-08-14 | End: 2021-08-17 | Stop reason: HOSPADM

## 2021-08-14 RX ORDER — DICYCLOMINE HCL 20 MG
20 TABLET ORAL 4 TIMES DAILY PRN
Status: DISCONTINUED | OUTPATIENT
Start: 2021-08-14 | End: 2021-08-17 | Stop reason: HOSPADM

## 2021-08-14 RX ORDER — SODIUM CHLORIDE 0.9 % (FLUSH) 0.9 %
10 SYRINGE (ML) INJECTION PRN
Status: DISCONTINUED | OUTPATIENT
Start: 2021-08-14 | End: 2021-08-17 | Stop reason: HOSPADM

## 2021-08-14 RX ORDER — POLYETHYLENE GLYCOL 3350 17 G/17G
17 POWDER, FOR SOLUTION ORAL DAILY PRN
Status: DISCONTINUED | OUTPATIENT
Start: 2021-08-14 | End: 2021-08-17 | Stop reason: HOSPADM

## 2021-08-14 RX ORDER — METOPROLOL SUCCINATE 50 MG/1
100 TABLET, EXTENDED RELEASE ORAL DAILY
Status: DISCONTINUED | OUTPATIENT
Start: 2021-08-14 | End: 2021-08-17 | Stop reason: HOSPADM

## 2021-08-14 RX ORDER — ACETAMINOPHEN 650 MG/1
650 SUPPOSITORY RECTAL EVERY 4 HOURS PRN
Status: DISCONTINUED | OUTPATIENT
Start: 2021-08-14 | End: 2021-08-17 | Stop reason: HOSPADM

## 2021-08-14 RX ORDER — POTASSIUM CHLORIDE 7.45 MG/ML
10 INJECTION INTRAVENOUS
Status: COMPLETED | OUTPATIENT
Start: 2021-08-14 | End: 2021-08-14

## 2021-08-14 RX ORDER — SODIUM CHLORIDE 0.9 % (FLUSH) 0.9 %
10 SYRINGE (ML) INJECTION EVERY 12 HOURS SCHEDULED
Status: DISCONTINUED | OUTPATIENT
Start: 2021-08-14 | End: 2021-08-17 | Stop reason: HOSPADM

## 2021-08-14 RX ORDER — MAGNESIUM SULFATE IN WATER 40 MG/ML
4000 INJECTION, SOLUTION INTRAVENOUS ONCE
Status: COMPLETED | OUTPATIENT
Start: 2021-08-14 | End: 2021-08-14

## 2021-08-14 RX ORDER — CETIRIZINE HYDROCHLORIDE 10 MG/1
10 TABLET ORAL DAILY
Status: DISCONTINUED | OUTPATIENT
Start: 2021-08-14 | End: 2021-08-17 | Stop reason: HOSPADM

## 2021-08-14 RX ORDER — LEVOTHYROXINE SODIUM 0.12 MG/1
125 TABLET ORAL
Status: DISCONTINUED | OUTPATIENT
Start: 2021-08-14 | End: 2021-08-17 | Stop reason: HOSPADM

## 2021-08-14 RX ORDER — ASPIRIN 81 MG/1
81 TABLET, CHEWABLE ORAL DAILY
Status: DISCONTINUED | OUTPATIENT
Start: 2021-08-14 | End: 2021-08-17 | Stop reason: HOSPADM

## 2021-08-14 RX ORDER — SODIUM CHLORIDE 9 MG/ML
25 INJECTION, SOLUTION INTRAVENOUS PRN
Status: DISCONTINUED | OUTPATIENT
Start: 2021-08-14 | End: 2021-08-17 | Stop reason: HOSPADM

## 2021-08-14 RX ORDER — ALPRAZOLAM 0.5 MG/1
1 TABLET ORAL
Status: ACTIVE | OUTPATIENT
Start: 2021-08-14 | End: 2021-08-14

## 2021-08-14 RX ADMIN — ASPIRIN 81 MG: 81 TABLET, CHEWABLE ORAL at 08:30

## 2021-08-14 RX ADMIN — GADOBENATE DIMEGLUMINE 20 ML: 529 INJECTION, SOLUTION INTRAVENOUS at 13:59

## 2021-08-14 RX ADMIN — AMLODIPINE BESYLATE 2.5 MG: 5 TABLET ORAL at 08:30

## 2021-08-14 RX ADMIN — POTASSIUM CHLORIDE 10 MEQ: 7.46 INJECTION, SOLUTION INTRAVENOUS at 09:26

## 2021-08-14 RX ADMIN — POTASSIUM CHLORIDE 10 MEQ: 7.46 INJECTION, SOLUTION INTRAVENOUS at 11:40

## 2021-08-14 RX ADMIN — METOPROLOL SUCCINATE 100 MG: 50 TABLET, EXTENDED RELEASE ORAL at 08:30

## 2021-08-14 RX ADMIN — Medication 12.5 MG: at 01:20

## 2021-08-14 RX ADMIN — LEVOTHYROXINE SODIUM 125 MCG: 0.12 TABLET ORAL at 05:31

## 2021-08-14 RX ADMIN — MAGNESIUM SULFATE HEPTAHYDRATE 4000 MG: 40 INJECTION, SOLUTION INTRAVENOUS at 09:25

## 2021-08-14 RX ADMIN — POTASSIUM CHLORIDE 10 MEQ: 7.46 INJECTION, SOLUTION INTRAVENOUS at 12:44

## 2021-08-14 RX ADMIN — ACETAMINOPHEN 650 MG: 325 TABLET ORAL at 15:14

## 2021-08-14 RX ADMIN — ENOXAPARIN SODIUM 40 MG: 40 INJECTION SUBCUTANEOUS at 08:30

## 2021-08-14 RX ADMIN — SODIUM CHLORIDE, PRESERVATIVE FREE 10 ML: 5 INJECTION INTRAVENOUS at 08:32

## 2021-08-14 RX ADMIN — ACETAMINOPHEN 650 MG: 325 TABLET ORAL at 02:30

## 2021-08-14 RX ADMIN — POTASSIUM CHLORIDE 10 MEQ: 7.46 INJECTION, SOLUTION INTRAVENOUS at 10:23

## 2021-08-14 RX ADMIN — SODIUM CHLORIDE: 9 INJECTION, SOLUTION INTRAVENOUS at 23:18

## 2021-08-14 RX ADMIN — CETIRIZINE HYDROCHLORIDE 10 MG: 10 TABLET, FILM COATED ORAL at 08:31

## 2021-08-14 RX ADMIN — SODIUM CHLORIDE: 9 INJECTION, SOLUTION INTRAVENOUS at 03:52

## 2021-08-14 RX ADMIN — SODIUM CHLORIDE 1000 ML: 9 INJECTION, SOLUTION INTRAVENOUS at 01:05

## 2021-08-14 RX ADMIN — SODIUM CHLORIDE, PRESERVATIVE FREE 10 ML: 5 INJECTION INTRAVENOUS at 21:00

## 2021-08-14 RX ADMIN — ONDANSETRON 4 MG: 2 INJECTION INTRAMUSCULAR; INTRAVENOUS at 00:02

## 2021-08-14 RX ADMIN — ACETAMINOPHEN 650 MG: 325 TABLET ORAL at 23:23

## 2021-08-14 ASSESSMENT — PAIN DESCRIPTION - DESCRIPTORS
DESCRIPTORS: ACHING;CRAMPING

## 2021-08-14 ASSESSMENT — PAIN DESCRIPTION - FREQUENCY
FREQUENCY: CONTINUOUS

## 2021-08-14 ASSESSMENT — PAIN SCALES - GENERAL
PAINLEVEL_OUTOF10: 7
PAINLEVEL_OUTOF10: 2
PAINLEVEL_OUTOF10: 8
PAINLEVEL_OUTOF10: 7
PAINLEVEL_OUTOF10: 5
PAINLEVEL_OUTOF10: 8
PAINLEVEL_OUTOF10: 3

## 2021-08-14 ASSESSMENT — PAIN DESCRIPTION - PAIN TYPE
TYPE: ACUTE PAIN

## 2021-08-14 ASSESSMENT — PAIN DESCRIPTION - ONSET
ONSET: ON-GOING

## 2021-08-14 ASSESSMENT — PAIN DESCRIPTION - LOCATION
LOCATION: ABDOMEN

## 2021-08-14 NOTE — ED TRIAGE NOTES
Sada Archuleta is a 67 y.o. female was brought to the ER for eval of left sided chest pain that radiates up left neck. The patient also states that she has abd pain that started yesterday. Pain is a 5/10. The patient is alert and oriented with an open and patent airway with a normal respiratory effort. The patient was given Zofran and 500ml 0.9NaCL  en route. The patient took 81mg ASA at home before the squad arrived. The patient is alert and oriented with an open and patent airway with a normal respiratory effort.

## 2021-08-14 NOTE — ED PROVIDER NOTES
[address]  [address] [address]  Dept: [phone]  Loc: [address] ENCOUNTER        This patient was not seen or evaluated by the attending physician. I evaluated this patient, the attending physician was available for consultation. CHIEF COMPLAINT    Chief Complaint   Patient presents with    Chest Pain     left sied chest pain started x 1 hour and went up left neck, hx of panic attacks       HPI    Yuly Manrique is a 67 y.o. female who presents with chest pain/epigastric pain. Onset was few hours prior to arrival.  The duration has been intermittent since the onset. The quality of the pain is cramping like. Worsens with movements. It is generalized across her entire chest and not localized. Does not worsen with exertion. Severity is 5/10. The pain is not associated with a cough. There are no alleviating factors. The patient denies any associated radiation of the pain, vomiting, diaphoresis, or increased pain with exertion. Came to the ED for further evaluation and treatment. REVIEW OF SYSTEMS    Cardiac: see HPI, No syncope  Respiratory: no cough or sputum production, No hemoptysis  GI: No Vomiting or Diarrhea  General: No Fever  All other systems reviewed and are negative.      PAST MEDICAL & SURGICAL HISTORY    Past Medical History:   Diagnosis Date    Asthma     Diverticulitis     Graves disease     Hyperlipidemia     Hypertension     Hyperthyroidism     Hypothyroidism     Lymphedema of both lower extremities 2/7/2020    Mild persistent asthma without complication     Osteoarthritis     Peptic ulcer, unspecified site, unspecified as acute or chronic, without mention of hemorrhage or perforation     Prolonged emergence from general anesthesia     Sleep apnea     CPAP at night    Venous insufficiency     Wears glasses      Past Surgical History:   Procedure Laterality Date    BLEPHAROPLASTY Left     CHOLECYSTECTOMY  2001    COLONOSCOPY  2011    normal-daniel    FOOT SURGERY Left     spurs removed from left heel    HYSTERECTOMY      KNEE ARTHROSCOPY Left     ROTATOR CUFF REPAIR Right        CURRENT MEDICATIONS  (may include discharge medications prescribed in the ED)  Current Outpatient Rx   Medication Sig Dispense Refill    ondansetron (ZOFRAN-ODT) 4 MG disintegrating tablet Place 4 mg under the tongue every 8 hours as needed for Nausea or Vomiting      ezetimibe (ZETIA) 10 MG tablet TAKE 1 TABLET DAILY 90 tablet 3    amLODIPine (NORVASC) 2.5 MG tablet TAKE 1 TABLET BY MOUTH DAILY 90 tablet 3    telmisartan-hydrochlorothiazide (MICARDIS HCT) 80-25 MG per tablet TAKE 1 TABLET BY MOUTH DAILY 90 tablet 3    loratadine (CLARITIN) 10 MG tablet Take 10 mg by mouth daily      pravastatin (PRAVACHOL) 20 MG tablet Take 1 tablet by mouth nightly 90 tablet 3    nebivolol (BYSTOLIC) 10 MG tablet TAKE 1 TABLET DAILY 90 tablet 3    dicyclomine (BENTYL) 20 MG tablet Take 1 tablet by mouth every 6 hours 120 tablet 1    levothyroxine (SYNTHROID) 125 MCG tablet TAKE 1 TABLET BY MOUTH DAILY 90 tablet 3    Multiple Vitamins-Minerals (THERAPEUTIC MULTIVITAMIN-MINERALS) tablet Take 1 tablet by mouth daily      vitamin C (ASCORBIC ACID) 500 MG tablet Take 500 mg by mouth daily      Cholecalciferol (VITAMIN D3) 125 MCG (5000 UT) TABS Take 50 each by mouth      MAGNESIUM PO Take by mouth      mometasone-formoterol (DULERA) 200-5 MCG/ACT inhaler INHALE 2 PUFFS INTO THE LUNGS TWICE DAILY (Patient taking differently: Only using once or twice weekly) 1 Inhaler 3    PROAIR  (90 Base) MCG/ACT inhaler USE 2 INHALATIONS ORALLY   INTO THE LUNGS EVERY 6     HOURS AS NEEDEDFOR        WHEEZING 8.5 g 4    cyanocobalamin ([employer]S VITAMIN B12) 1000 MCG tablet Take 1 tablet by mouth daily 30 tablet 3    aspirin 81 MG tablet Take 81 mg by mouth daily.          ALLERGIES    Allergies   Allergen Reactions    Montelukast Sodium Shortness Of Breath    Sulfa Antibiotics Shortness Of Breath    Strawberry Extract Rash       SOCIAL & FAMILY HISTORY    Social History     Socioeconomic History    Marital status:      Spouse name: None    Number of children: 3    Years of education: None    Highest education level: None   Occupational History    None   Tobacco Use    Smoking status: Former Smoker     Packs/day: 0.25     Years: 3.00     Pack years: 0.75     Types: Cigarettes     Start date: 10/21/1961     Quit date: 10/21/1964     Years since quittin.6    Smokeless tobacco: Never Used   Vaping Use    Vaping Use: Never used   Substance and Sexual Activity    Alcohol use: Yes     Comment: SOCIALLY-wine    Drug use: No    Sexual activity: Never   Other Topics Concern    None   Social History Narrative    None     Social Determinants of Health     Financial Resource Strain: Low Risk     Difficulty of Paying Living Expenses: Not hard at all   Food Insecurity: No Food Insecurity    Worried About Running Out of Food in the Last Year: Never true    Ivanna of Food in the Last Year: Never true   Transportation Needs:     Lack of Transportation (Medical):      Lack of Transportation (Non-Medical):    Physical Activity:     Days of Exercise per Week:     Minutes of Exercise per Session:    Stress:     Feeling of Stress :    Social Connections:     Frequency of Communication with Friends and Family:     Frequency of Social Gatherings with Friends and Family:     Attends Alevism Services:     Active Member of Clubs or Organizations:     Attends Club or Organization Meetings:     Marital Status:    Intimate Partner Violence:     Fear of Current or Ex-Partner:     Emotionally Abused:     Physically Abused:     Sexually Abused:      Family History   Problem Relation Age of Onset    Hypertension Mother     Colon Cancer Father     Diabetes Sister        PHYSICAL EXAM    VITAL SIGNS: BP (!) 177/81   Pulse 89   Resp 16   Ht 5' 1.5\" (1.562 m)   Wt 214 lb 4.6 oz (97.2 kg)   SpO2 100%   BMI 39.83 kg/m²    Constitutional:  Well developed, well nourished, no acute distress   HENT:  Atraumatic, moist mucus membranes  Neck: supple, no JVD   Respiratory:  Lungs clear to auscultation bilaterally, no retractions  Cardiovascular:  regular rate, no murmurs  Vascular: Radial and DP pulses 2+ and equal bilaterally  GI:  Soft, positive right upper quadrant and epigastric abdominal pain upon palpation, normal bowel sounds  Musculoskeletal:  no acute deformities, no lower extremity edema, no lower extremity asymmetry, no calf tenderness, no thigh tenderness  Integument:  Skin warm and dry, no petechiae   Neurologic:  Alert & oriented, no slurred speech  Psych: Pleasant affect, no hallucinations    EKG    Please see the physician note for EKG interpretation. RADIOLOGY/PROCEDURES    CT ABDOMEN PELVIS W IV CONTRAST Additional Contrast? None   Final Result   1. Acute pancreatitis. 2. Other findings as described. XR CHEST PORTABLE   Final Result   Nonspecific minimal left basilar opacity could represent atelectasis in the   absence of clinical concern for infection. ED COURSE & MEDICAL DECISION MAKING    Pertinent tests interpreted. (See chart for details)  See chart for details of medications given during the ED stay.     Vitals:    08/13/21 2127   BP: (!) 177/81   Pulse: 89   Resp: 16   SpO2: 100%   Weight: 214 lb 4.6 oz (97.2 kg)   Height: 5' 1.5\" (1.562 m)       Differential Diagnosis: URI, Musculoskeletal chest pain, Pleurisy, Pulmonary edema, Congestive Heart Failure, ACS, Pulmonary Embolus, Thoracic Dissection, Pericarditis, Pericardial Effusion, Pneumonia, Pneumothorax, Anxiety, GERD, arrhythmia, electrolyte derangement, anemia, other    CRITICAL CARE NOTE:  There was a high probability of clinically significant life-threatening deterioration of the patient's condition requiring my urgent intervention. Total critical care time was at least 15 minutes. This includes vital sign monitoring, pulse oximetry monitoring, telemetry monitoring, clinical response to the IV medications, reviewing the nursing notes, consultation time, dictation/documentation time, and interpretation of the labwork. This excludes any separately billable procedures performed. Patient is afebrile and nontoxic in appearance. Labs reveal no leukocytosis or anemia. Metabolic panel unremarkable. Lipase is significantly elevated at greater than 3000. Also has a transaminitis with an alk phos of 338, ,  and a total bili of 1.1. History of cholecystectomy. CXR findings as above. EKG interpreted by physician. Troponin negative. Given the transaminitis I did obtain a CT abdomen pelvis study, see above for full radiology read. Given that she has a significant transaminitis with pancreatitis, no history of this or alcohol abuse. Will admit, I consulted the hospitalist who agreed to admit patient and write orders for admission.       Results for orders placed or performed during the hospital encounter of 08/13/21   CBC Auto Differential   Result Value Ref Range    WBC 4.3 4.0 - 11.0 K/uL    RBC 4.24 4.00 - 5.20 M/uL    Hemoglobin 12.7 12.0 - 16.0 g/dL    Hematocrit 37.0 36.0 - 48.0 %    MCV 87.4 80.0 - 100.0 fL    MCH 30.1 26.0 - 34.0 pg    MCHC 34.4 31.0 - 36.0 g/dL    RDW 12.6 12.4 - 15.4 %    Platelets 344 328 - 387 K/uL    MPV 6.7 5.0 - 10.5 fL    Neutrophils % 91.9 %    Lymphocytes % 7.0 %    Monocytes % 0.7 %    Eosinophils % 0.3 %    Basophils % 0.1 %    Neutrophils Absolute 4.0 1.7 - 7.7 K/uL    Lymphocytes Absolute 0.3 (L) 1.0 - 5.1 K/uL    Monocytes Absolute 0.0 0.0 - 1.3 K/uL    Eosinophils Absolute 0.0 0.0 - 0.6 K/uL    Basophils Absolute 0.0 0.0 - 0.2 K/uL   Comprehensive Metabolic Panel w/ Reflex to MG   Result Value Ref Range    Sodium 130 (L) 136 - 145 mmol/L    Potassium reflex Magnesium 3.6 3.5 - 5.1 mmol/L    Chloride 92 (L) 99 - 110 mmol/L    CO2 25 21 - 32 mmol/L    Anion Gap 13 3 - 16    Glucose 147 (H) 70 - 99 mg/dL    BUN 25 (H) 7 - 20 mg/dL    CREATININE 0.8 0.6 - 1.2 mg/dL    GFR Non-African American >60 >60    GFR African American >60 >60    Calcium 8.9 8.3 - 10.6 mg/dL    Total Protein 6.6 6.4 - 8.2 g/dL    Albumin 3.9 3.4 - 5.0 g/dL    Albumin/Globulin Ratio 1.4 1.1 - 2.2    Total Bilirubin 1.1 (H) 0.0 - 1.0 mg/dL    Alkaline Phosphatase 338 (H) 40 - 129 U/L     (H) 10 - 40 U/L     (H) 15 - 37 U/L    Globulin 2.7 g/dL   Lipase   Result Value Ref Range    Lipase >3,000.0 (H) 13.0 - 60.0 U/L   Troponin   Result Value Ref Range    Troponin <0.01 <0.01 ng/mL   Brain Natriuretic Peptide   Result Value Ref Range    Pro- 0 - 449 pg/mL   Urinalysis Reflex to Culture    Specimen: Urine, clean catch   Result Value Ref Range    Color, UA YELLOW Straw/Yellow    Clarity, UA Clear Clear    Glucose, Ur Negative Negative mg/dL    Bilirubin Urine Negative Negative    Ketones, Urine Negative Negative mg/dL    Specific Gravity, UA 1.015 1.005 - 1.030    Blood, Urine Negative Negative    pH, UA 6.0 5.0 - 8.0    Protein, UA Negative Negative mg/dL    Urobilinogen, Urine 1.0 <2.0 E.U./dL    Nitrite, Urine Negative Negative    Leukocyte Esterase, Urine TRACE (A) Negative    Microscopic Examination YES     Urine Type NotGiven     Urine Reflex to Culture Not Indicated    Ethanol   Result Value Ref Range    Ethanol Lvl None Detected mg/dL   Microscopic Urinalysis   Result Value Ref Range    Bacteria, UA 4+ (A) None Seen /HPF    Hyaline Casts, UA 0 0 - 8 /LPF    WBC, UA 7 (H) 0 - 5 /HPF    RBC, UA 1 0 - 4 /HPF    Epithelial Cells, UA 0 0 - 5 /HPF   EKG 12 Lead   Result Value Ref Range    Ventricular Rate 89 BPM    Atrial Rate 89 BPM    P-R Interval 182 ms    QRS Duration 88 ms    Q-T Interval 366 ms    QTc Calculation (Bazett) 445 ms    P Axis 58 degrees    R Axis 49 degrees T Axis 19 degrees    Diagnosis       Normal sinus rhythmCannot rule out Anterior infarct , age undeterminedAbnormal ECGWhen compared with ECG of 09-MAR-2020 18:18,Vent. rate has increased BY  31 BPMNonspecific T wave abnormality, worse in Anterior leads       FINAL Impression    1. Acute pancreatitis, unspecified complication status, unspecified pancreatitis type    2. Transaminitis    3.  Pain of upper abdomen          PLAN  Admission     (Please note that this note was completed with a voice recognition program.  Every attempt was made to edit the dictations, but inevitably there remain words that are mis-transcribed.)             TIFFANIE Priest - CNP  08/14/21 [address] [employer]

## 2021-08-14 NOTE — PROGRESS NOTES
4 Eyes Skin Assessment     NAME:  Yuly Manrique  YOB: 1945  MEDICAL RECORD NUMBER:  [id number]    The patient is being assess for  Admission    I agree that 2 RN's have performed a thorough Head to Toe Skin Assessment on the patient. ALL assessment sites listed below have been assessed. Areas assessed by both nurses:    Head, Face, Ears, Shoulders, Back, Chest, Arms, Elbows, Hands, Sacrum. Buttock, Coccyx, Ischium and Legs. Feet and Heels        Does the Patient have a Wound?  No noted wound(s)       Vidal Prevention initiated:  No   Wound Care Orders initiated:  No    Pressure Injury (Stage 3,4, Unstageable, DTI, NWPT, and Complex wounds) if present place consult order under [de-identified] No    New and Established Ostomies if present place consult order under : No      Nurse 1 eSignature: Electronically signed by Corey Cifuentes RN on 8/14/21 at 2:47 AM EDT    **SHARE this note so that the co-signing nurse is able to place an eSignature**    Nurse 2 eSignature: Electronically signed by Leif Alegre RN on 8/14/21 at 2:53 AM EDT

## 2021-08-14 NOTE — H&P
Hospital Medicine History & Physical      PCP: Kingston Blood MD    Date of Admission: 8/13/2021    Date of Service: Pt seen/examined on 8/14/2021 and Admitted to Inpatient. Chief Complaint: abd pain. History Of Present Illness: The patient is a 67 y.o. female who presents to [facility] with abd pain. Pt reports she had a bout of abd pain and diarrhea a week ago - saw her PCP and was told she probably had a \"stomach flu\" and recommended supportive care. She improved and felt well up until yesterday when she developed recurrent bouts of nausea and emesis followed by diarrhea and worsening epigastric abdominal pain. Pain does not radiate to the back and does not localize to RUQ. No fever. Work up in ED concerning for acute pancreatitis. Pt reports Hx of remote cholecystectomy. She does not drink alcohol.    Her TG level has been normal.         Past Medical History:        Diagnosis Date    Asthma     Diverticulitis     Graves disease     Hyperlipidemia     Hypertension     Hyperthyroidism     Hypothyroidism     Lymphedema of both lower extremities 2/7/2020    Mild persistent asthma without complication     Osteoarthritis     Peptic ulcer, unspecified site, unspecified as acute or chronic, without mention of hemorrhage or perforation     Prolonged emergence from general anesthesia     Sleep apnea     CPAP at night    Venous insufficiency     Wears glasses        Past Surgical History:        Procedure Laterality Date    BLEPHAROPLASTY Left     CHOLECYSTECTOMY  2001    COLONOSCOPY  2011    normal-daniel    FOOT SURGERY Left     spurs removed from left heel    HYSTERECTOMY      KNEE ARTHROSCOPY Left     ROTATOR CUFF REPAIR Right        Medications Prior to Admission:    Prior to Admission medications    Medication Sig Start Date End Date Taking?  Authorizing Provider   terconazole (TERAZOL 7) 0.4 % vaginal cream Place vaginally 2 times daily 8/5/21  Yes Historical Provider, MD   betamethasone valerate (VALISONE) 0.1 % cream Apply topically 2 times daily 8/5/21  Yes Historical Provider, MD   ondansetron (ZOFRAN-ODT) 4 MG disintegrating tablet Place 4 mg under the tongue every 8 hours as needed for Nausea or Vomiting   Yes Historical Provider, MD   ezetimibe (ZETIA) 10 MG tablet TAKE 1 TABLET DAILY 6/7/21  Yes Fede Cavazos MD   amLODIPine (NORVASC) 2.5 MG tablet TAKE 1 TABLET BY MOUTH DAILY 5/5/21  Yes Fede Cavazos MD   telmisartan-hydrochlorothiazide (MICARDIS HCT) 80-25 MG per tablet TAKE 1 TABLET BY MOUTH DAILY 2/17/21  Yes Fede Cavazos MD   loratadine (CLARITIN) 10 MG tablet Take 10 mg by mouth daily   Yes Historical Provider, MD   pravastatin (PRAVACHOL) 20 MG tablet Take 1 tablet by mouth nightly 2/8/21  Yes Alison Argueta MD   nebivolol (BYSTOLIC) 10 MG tablet TAKE 1 TABLET DAILY 12/22/20  Yes Fede Cavazos MD   dicyclomine (BENTYL) 20 MG tablet Take 1 tablet by mouth every 6 hours  Patient taking differently: Take 20 mg by mouth every 6 hours as needed (abdominal cramping)  12/14/20  Yes Fede Cavazos MD   levothyroxine (SYNTHROID) 125 MCG tablet TAKE 1 TABLET BY MOUTH DAILY 12/14/20  Yes Fede Cavazos MD   Multiple Vitamins-Minerals (THERAPEUTIC MULTIVITAMIN-MINERALS) tablet Take 1 tablet by mouth daily   Yes Historical Provider, MD   vitamin C (ASCORBIC ACID) 500 MG tablet Take 500 mg by mouth daily   Yes Historical Provider, MD   Cholecalciferol (VITAMIN D3) 125 MCG (5000 UT) TABS Take 5,000 Units by mouth daily    Yes Historical Provider, MD   MAGNESIUM PO Take 400 mg by mouth daily    Yes Historical Provider, MD   PROAIR  (90 Base) MCG/ACT inhaler USE 2 INHALATIONS ORALLY   INTO THE LUNGS EVERY 6     HOURS AS NEEDEDFOR        WHEEZING 7/15/19  Yes Fede Cavazos MD   cyanocobalamin ([employer]S VITAMIN B12) 1000 MCG tablet Take 1 tablet by mouth daily 4/15/16  Yes Claudene Lux, MD   aspirin 81 MG tablet Take 81 mg by mouth daily. Yes Historical Provider, MD       Allergies:  Montelukast sodium, Sulfa antibiotics, and Strawberry extract    Social History:  The patient currently lives at home. TOBACCO:   reports that she quit smoking about 56 years ago. Her smoking use included cigarettes. She started smoking about 59 years ago. She has a 0.75 pack-year smoking history. She has never used smokeless tobacco.  ETOH:   reports current alcohol use. Family History:  Reviewed in detail and negative for DM, Early CAD, Cancer, CVA. Positive as follows:        Problem Relation Age of Onset    Hypertension Mother     Colon Cancer Father     Diabetes Sister        REVIEW OF SYSTEMS:   As noted in the HPI. All other systems reviewed and negative. PHYSICAL EXAM:    /66   Pulse 64   Temp 97.4 °F (36.3 °C) (Oral)   Resp 18   Ht 5' 1\" (1.549 m)   Wt 223 lb 12.3 oz (101.5 kg)   SpO2 92%   BMI 42.28 kg/m²     General appearance: No apparent distress appears stated age and cooperative. HEENT Normal cephalic, atraumatic without obvious deformity. Pupils equal, round, and reactive to light. Extra ocular muscles intact. Conjunctivae/corneas clear. Neck: Supple, No jugular venous distention/bruits. Trachea midline without thyromegaly or adenopathy with full range of motion. Lungs: Clear to auscultation, bilaterally without Rales/Wheezes/Rhonchi with good respiratory effort. Heart: Regular rate and rhythm with Normal S1/S2 without murmurs, rubs or gallops, point of maximum impulse non-displaced  Abdomen: Soft, non-tender or non-distended without rigidity or guarding and positive bowel sounds all four quadrants. Extremities: No clubbing, cyanosis, or edema bilaterally. Full range of motion without deformity and normal gait intact.   Skin: Skin color, texture, turgor normal.  No rashes or lesions. Neurologic: Alert and oriented X 3, neurovascularly intact with sensory/motor intact upper extremities/lower extremities, bilaterally. Cranial nerves: II-XII intact, grossly non-focal.  Mental status: Alert, oriented, thought content appropriate. Capillary Refill: Acceptable  < 3 seconds  Peripheral Pulses: +3 Easily felt, not easily obliterated with pressure        CBC   Recent Labs     08/13/21 2146 08/14/21 0626   WBC 4.3 12.1*   HGB 12.7 11.7*   HCT 37.0 34.6*    145      RENAL  Recent Labs     08/13/21 2146 08/14/21 0626   * 133*   K 3.6 2.7*   CL 92* 98*   CO2 25 22   BUN 25* 21*   CREATININE 0.8 0.9     LFT'S  Recent Labs     08/13/21 2146 08/14/21 0626   * 560*   * 538*   BILITOT 1.1* 1.8*   ALKPHOS 338* 328*     COAG  No results for input(s): INR in the last 72 hours. CARDIAC ENZYMES  Recent Labs     08/13/21 2146   TROPONINI <0.01       U/A:    Lab Results   Component Value Date    NITRITE neg 05/23/2019    COLORU YELLOW 08/13/2021    WBCUA 7 08/13/2021    RBCUA 1 08/13/2021    BACTERIA 4+ 08/13/2021    CLARITYU Clear 08/13/2021    SPECGRAV 1.015 08/13/2021    LEUKOCYTESUR TRACE 08/13/2021    BLOODU Negative 08/13/2021    GLUCOSEU Negative 08/13/2021       ABG  No results found for: HOY1CXB, BEART, L5GLNTBT, PHART, THGBART, EQO9KVT, PO2ART, VSO9PSO        Active Hospital Problems    Diagnosis Date Noted    Pancreatitis, unspecified pancreatitis type [K85.90] 08/14/2021         PHYSICIANS CERTIFICATION:    I certify that Brook Moe is expected to be hospitalized for more than 2 midnights based on the following assessment and plan:      ASSESSMENT/PLAN:        Acute Idiopathic Pancreatitis. MRCP did not reveal clear signs of obstruction. Cont NPO, IVF. Will need MRCP - likely once acute pancreatitis attack resolved/improved. HTN - cont amlodipine and toprol XL      Hypothyroidism on Synthroid.          DVT Prophylaxis: lovenox  Diet: Diet NPO Exceptions are: Ice Chips, Sips of Water with Meds, Sips of Clear Liquids  Code Status: Full Code    Dispo - cc. Kateryna Carrington MD    Thank you Park Collins MD for the opportunity to be involved in this patient's care. If you have any questions or concerns please feel free to contact me at [phone].

## 2021-08-14 NOTE — ED NOTES
Bed: B-05  Expected date: 8/13/21  Expected time: 9:14 PM  Means of arrival: [facility]  Comments:  Chest pain     Tristin [facility]  08/13/21 2120

## 2021-08-14 NOTE — ED PROVIDER NOTES
I was available for consultation during patient's ED stay. Patient was cared for by ALONA. I did not evaluate or participate in patient care. The Ekg interpreted by me shows  normal sinus rhythm with a rate of 89  Axis is   Normal  QTc is  normal  Intervals and Durations are unremarkable.       ST Segments: nonspecific changes  No prior EKG to compare    MD Paola Tamayo MD  08/13/21 9635

## 2021-08-14 NOTE — PROGRESS NOTES
Medication Reconciliation    List of medications for Damien Poag is currently taking is complete. Source of Information:   Epic records  Conversation with patient at bedside     Allergies  Allergy list not thoroughly reviewed with patient at this time  Allergies listed in Epic as follows: Montelukast sodium, Sulfa antibiotics, and Strawberry extract    Notes Regarding Home Medications:    Added betamethasone and terconazole topicals prescribed by patient's OBGYN  Removed Dulera, no longer taking  Still has dicyclomine at home that she uses PRN      Katlyn Shay [facility], PharmD   8/14/2021 12:41 PM

## 2021-08-14 NOTE — PROGRESS NOTES
Consult dictated. Imp/Plan 1. Acute pancreatitis, NPO, IVF and await MRCP r/o CBD stone post cholecystectomy and repeat labs in a.m.

## 2021-08-15 LAB
A/G RATIO: 1 (ref 1.1–2.2)
ALBUMIN SERPL-MCNC: 2.9 G/DL (ref 3.4–5)
ALP BLD-CCNC: 283 U/L (ref 40–129)
ALT SERPL-CCNC: 347 U/L (ref 10–40)
ANION GAP SERPL CALCULATED.3IONS-SCNC: 11 MMOL/L (ref 3–16)
AST SERPL-CCNC: 199 U/L (ref 15–37)
BASOPHILS ABSOLUTE: 0 K/UL (ref 0–0.2)
BASOPHILS RELATIVE PERCENT: 0.2 %
BILIRUB SERPL-MCNC: 1.5 MG/DL (ref 0–1)
BUN BLDV-MCNC: 14 MG/DL (ref 7–20)
CALCIUM SERPL-MCNC: 8.2 MG/DL (ref 8.3–10.6)
CHLORIDE BLD-SCNC: 103 MMOL/L (ref 99–110)
CO2: 22 MMOL/L (ref 21–32)
CREAT SERPL-MCNC: 0.8 MG/DL (ref 0.6–1.2)
EOSINOPHILS ABSOLUTE: 0.1 K/UL (ref 0–0.6)
EOSINOPHILS RELATIVE PERCENT: 0.9 %
GFR AFRICAN AMERICAN: >60
GFR NON-AFRICAN AMERICAN: >60
GLOBULIN: 2.8 G/DL
GLUCOSE BLD-MCNC: 82 MG/DL (ref 70–99)
HCT VFR BLD CALC: 34.3 % (ref 36–48)
HEMOGLOBIN: 11.4 G/DL (ref 12–16)
LIPASE: 1733 U/L (ref 13–60)
LYMPHOCYTES ABSOLUTE: 0.7 K/UL (ref 1–5.1)
LYMPHOCYTES RELATIVE PERCENT: 7.6 %
MAGNESIUM: 2.1 MG/DL (ref 1.8–2.4)
MCH RBC QN AUTO: 29.7 PG (ref 26–34)
MCHC RBC AUTO-ENTMCNC: 33.2 G/DL (ref 31–36)
MCV RBC AUTO: 89.3 FL (ref 80–100)
MONOCYTES ABSOLUTE: 0.4 K/UL (ref 0–1.3)
MONOCYTES RELATIVE PERCENT: 4.2 %
NEUTROPHILS ABSOLUTE: 7.8 K/UL (ref 1.7–7.7)
NEUTROPHILS RELATIVE PERCENT: 87.1 %
PDW BLD-RTO: 13.3 % (ref 12.4–15.4)
PLATELET # BLD: 114 K/UL (ref 135–450)
PMV BLD AUTO: 7.5 FL (ref 5–10.5)
POTASSIUM REFLEX MAGNESIUM: 3.1 MMOL/L (ref 3.5–5.1)
RBC # BLD: 3.84 M/UL (ref 4–5.2)
SODIUM BLD-SCNC: 136 MMOL/L (ref 136–145)
TOTAL PROTEIN: 5.7 G/DL (ref 6.4–8.2)
TRIGL SERPL-MCNC: 53 MG/DL (ref 0–150)
WBC # BLD: 8.9 K/UL (ref 4–11)

## 2021-08-15 PROCEDURE — 36415 COLL VENOUS BLD VENIPUNCTURE: CPT

## 2021-08-15 PROCEDURE — 80053 COMPREHEN METABOLIC PANEL: CPT

## 2021-08-15 PROCEDURE — 2580000003 HC RX 258: Performed by: INTERNAL MEDICINE

## 2021-08-15 PROCEDURE — 85025 COMPLETE CBC W/AUTO DIFF WBC: CPT

## 2021-08-15 PROCEDURE — 84478 ASSAY OF TRIGLYCERIDES: CPT

## 2021-08-15 PROCEDURE — 2060000000 HC ICU INTERMEDIATE R&B

## 2021-08-15 PROCEDURE — 6360000002 HC RX W HCPCS: Performed by: INTERNAL MEDICINE

## 2021-08-15 PROCEDURE — 83735 ASSAY OF MAGNESIUM: CPT

## 2021-08-15 PROCEDURE — 6370000000 HC RX 637 (ALT 250 FOR IP): Performed by: INTERNAL MEDICINE

## 2021-08-15 PROCEDURE — 83690 ASSAY OF LIPASE: CPT

## 2021-08-15 PROCEDURE — 94760 N-INVAS EAR/PLS OXIMETRY 1: CPT

## 2021-08-15 RX ORDER — SODIUM CHLORIDE AND POTASSIUM CHLORIDE .9; .15 G/100ML; G/100ML
SOLUTION INTRAVENOUS CONTINUOUS
Status: DISCONTINUED | OUTPATIENT
Start: 2021-08-15 | End: 2021-08-17

## 2021-08-15 RX ORDER — HYDRALAZINE HYDROCHLORIDE 20 MG/ML
10 INJECTION INTRAMUSCULAR; INTRAVENOUS EVERY 6 HOURS PRN
Status: DISCONTINUED | OUTPATIENT
Start: 2021-08-15 | End: 2021-08-17 | Stop reason: HOSPADM

## 2021-08-15 RX ORDER — FUROSEMIDE 10 MG/ML
40 INJECTION INTRAMUSCULAR; INTRAVENOUS ONCE
Status: COMPLETED | OUTPATIENT
Start: 2021-08-15 | End: 2021-08-15

## 2021-08-15 RX ADMIN — POTASSIUM CHLORIDE AND SODIUM CHLORIDE: 900; 150 INJECTION, SOLUTION INTRAVENOUS at 08:31

## 2021-08-15 RX ADMIN — SODIUM CHLORIDE: 9 INJECTION, SOLUTION INTRAVENOUS at 04:36

## 2021-08-15 RX ADMIN — CETIRIZINE HYDROCHLORIDE 10 MG: 10 TABLET, FILM COATED ORAL at 07:41

## 2021-08-15 RX ADMIN — ACETAMINOPHEN 650 MG: 325 TABLET ORAL at 13:51

## 2021-08-15 RX ADMIN — POTASSIUM BICARBONATE 40 MEQ: 782 TABLET, EFFERVESCENT ORAL at 12:27

## 2021-08-15 RX ADMIN — ENOXAPARIN SODIUM 40 MG: 40 INJECTION SUBCUTANEOUS at 07:41

## 2021-08-15 RX ADMIN — POTASSIUM BICARBONATE 40 MEQ: 782 TABLET, EFFERVESCENT ORAL at 20:44

## 2021-08-15 RX ADMIN — METOPROLOL SUCCINATE 100 MG: 50 TABLET, EXTENDED RELEASE ORAL at 07:41

## 2021-08-15 RX ADMIN — ASPIRIN 81 MG: 81 TABLET, CHEWABLE ORAL at 07:40

## 2021-08-15 RX ADMIN — POTASSIUM CHLORIDE AND SODIUM CHLORIDE: 900; 150 INJECTION, SOLUTION INTRAVENOUS at 18:12

## 2021-08-15 RX ADMIN — SODIUM CHLORIDE, PRESERVATIVE FREE 10 ML: 5 INJECTION INTRAVENOUS at 07:42

## 2021-08-15 RX ADMIN — FUROSEMIDE 40 MG: 10 INJECTION, SOLUTION INTRAMUSCULAR; INTRAVENOUS at 12:28

## 2021-08-15 RX ADMIN — ACETAMINOPHEN 650 MG: 325 TABLET ORAL at 20:42

## 2021-08-15 RX ADMIN — HYDRALAZINE HYDROCHLORIDE 10 MG: 20 INJECTION INTRAMUSCULAR; INTRAVENOUS at 12:27

## 2021-08-15 RX ADMIN — AMLODIPINE BESYLATE 2.5 MG: 5 TABLET ORAL at 07:41

## 2021-08-15 RX ADMIN — LEVOTHYROXINE SODIUM 125 MCG: 0.12 TABLET ORAL at 06:07

## 2021-08-15 ASSESSMENT — PAIN DESCRIPTION - DESCRIPTORS
DESCRIPTORS: ACHING;CONSTANT;CRAMPING
DESCRIPTORS: HEADACHE

## 2021-08-15 ASSESSMENT — PAIN SCALES - GENERAL
PAINLEVEL_OUTOF10: 3
PAINLEVEL_OUTOF10: 6
PAINLEVEL_OUTOF10: 0
PAINLEVEL_OUTOF10: 0
PAINLEVEL_OUTOF10: 5
PAINLEVEL_OUTOF10: 3
PAINLEVEL_OUTOF10: 0

## 2021-08-15 ASSESSMENT — PAIN DESCRIPTION - FREQUENCY
FREQUENCY: CONTINUOUS
FREQUENCY: CONTINUOUS

## 2021-08-15 ASSESSMENT — PAIN DESCRIPTION - DIRECTION: RADIATING_TOWARDS: NON

## 2021-08-15 ASSESSMENT — PAIN DESCRIPTION - PAIN TYPE
TYPE: ACUTE PAIN
TYPE: ACUTE PAIN

## 2021-08-15 ASSESSMENT — PAIN DESCRIPTION - ORIENTATION: ORIENTATION: ANTERIOR

## 2021-08-15 ASSESSMENT — PAIN DESCRIPTION - ONSET
ONSET: ON-GOING
ONSET: ON-GOING

## 2021-08-15 ASSESSMENT — PAIN DESCRIPTION - PROGRESSION
CLINICAL_PROGRESSION: NOT CHANGED
CLINICAL_PROGRESSION: NOT CHANGED

## 2021-08-15 ASSESSMENT — PAIN DESCRIPTION - LOCATION
LOCATION: ABDOMEN
LOCATION: HEAD

## 2021-08-15 ASSESSMENT — PAIN - FUNCTIONAL ASSESSMENT
PAIN_FUNCTIONAL_ASSESSMENT: PREVENTS OR INTERFERES SOME ACTIVE ACTIVITIES AND ADLS
PAIN_FUNCTIONAL_ASSESSMENT: PREVENTS OR INTERFERES SOME ACTIVE ACTIVITIES AND ADLS

## 2021-08-15 NOTE — PROGRESS NOTES
Less abdominal pain. Abdomen epigastric tenderness. MRCP no CBD stones. Calcium normal. Lipase 1733. LFTs improving. Plan 1. NPO 2. IVF 3. Triglyceride level 4. Possibly passed CBD stone candace start adv. Diet in a.m. labs ordered.

## 2021-08-15 NOTE — PROGRESS NOTES
Hospitalist Progress Note      PCP: Jose Stephens MD    Date of Admission: 8/13/2021    Chief Complaint: abd pain nausea emesis, diarrhea      Subjective:     Feels better. Abd pain much improved. Gaining weight - on IVF for severe pancreatitis. Medications:  Reviewed    Infusion Medications    0.9% NaCl with KCl 20 mEq 150 mL/hr at 08/15/21 1033    sodium chloride       Scheduled Medications    metoprolol succinate  100 mg Oral Daily    cetirizine  10 mg Oral Daily    levothyroxine  125 mcg Oral QAM AC    aspirin  81 mg Oral Daily    amLODIPine  2.5 mg Oral Daily    sodium chloride flush  10 mL Intravenous 2 times per day    enoxaparin  40 mg Subcutaneous Daily     PRN Meds: albuterol sulfate HFA, dicyclomine, sodium chloride flush, sodium chloride, ondansetron, polyethylene glycol, acetaminophen **OR** acetaminophen      Intake/Output Summary (Last 24 hours) at 8/15/2021 1112  Last data filed at 8/15/2021 1033  Gross per 24 hour   Intake 5695.66 ml   Output 1800 ml   Net 3895.66 ml       Physical Exam Performed:    BP (!) 146/52   Pulse 69   Temp 98.2 °F (36.8 °C) (Oral)   Resp 18   Ht 5' 1\" (1.549 m)   Wt 225 lb 1.4 oz (102.1 kg)   SpO2 91%   BMI 42.53 kg/m²        General appearance: No apparent distress appears stated age and cooperative. HEENT Normal cephalic, atraumatic without obvious deformity. Pupils equal, round, and reactive to light. Extra ocular muscles intact. Conjunctivae/corneas clear. Neck: Supple, No jugular venous distention/bruits. Trachea midline without thyromegaly or adenopathy with full range of motion. Lungs: Clear to auscultation, bilaterally without Rales/Wheezes/Rhonchi with good respiratory effort.   Heart: Regular rate and rhythm with Normal S1/S2 without murmurs, rubs or gallops, point of maximum impulse non-displaced  Abdomen: Soft, non-tender or non-distended without rigidity or guarding and positive bowel sounds all four quadrants. Extremities: No clubbing, cyanosis, or edema bilaterally. Full range of motion without deformity and normal gait intact. Skin: Skin color, texture, turgor normal.  No rashes or lesions. Neurologic: Alert and oriented X 3, neurovascularly intact with sensory/motor intact upper extremities/lower extremities, bilaterally. Cranial nerves: II-XII intact, grossly non-focal.  Mental status: Alert, oriented, thought content appropriate. Capillary Refill: Acceptable  < 3 seconds  Peripheral Pulses: +3 Easily felt, not easily obliterated with pressure       Labs:   Recent Labs     08/13/21  2146 08/14/21  0626 08/15/21  0535   WBC 4.3 12.1* 8.9   HGB 12.7 11.7* 11.4*   HCT 37.0 34.6* 34.3*    145 114*     Recent Labs     08/14/21 0626 08/14/21  1538 08/15/21  0535   * 135* 136   K 2.7* 3.7 3.1*   CL 98* 101 103   CO2 22 21 22   BUN 21* 17 14   CREATININE 0.9 1.0 0.8   CALCIUM 8.5 8.4 8.2*   PHOS  --  3.0  --      Recent Labs     08/13/21  2146 08/14/21  0626 08/15/21  0535   * 560* 199*   * 538* 347*   BILITOT 1.1* 1.8* 1.5*   ALKPHOS 338* 328* 283*     No results for input(s): INR in the last 72 hours. Recent Labs     08/13/21 2146   TROPONINI <0.01       Urinalysis:      Lab Results   Component Value Date    NITRU Negative 08/13/2021    WBCUA 7 08/13/2021    BACTERIA 4+ 08/13/2021    RBCUA 1 08/13/2021    BLOODU Negative 08/13/2021    SPECGRAV 1.015 08/13/2021    GLUCOSEU Negative 08/13/2021       Radiology:  MRI ABDOMEN W WO CONTRAST MRCP   Final Result   1. Status post cholecystectomy. No biliary dilatation. 2. No choledocholithiasis. 3. Peripancreatic edema consistent with the history of acute pancreatitis. No evidence for pancreatic necrosis or acute peripancreatic fluid collections. CT ABDOMEN PELVIS W IV CONTRAST Additional Contrast? None   Final Result   1. Acute pancreatitis. 2. Other findings as described.          XR CHEST PORTABLE   Final Result Nonspecific minimal left basilar opacity could represent atelectasis in the   absence of clinical concern for infection. Assessment/Plan:    Active Hospital Problems    Diagnosis     Pancreatitis, unspecified pancreatitis type [K85.90]        Acute Idiopathic Pancreatitis. MRCP did not reveal clear signs of obstruction. Possibly passed stone. Cont IVF. May need ERCP - likely once acute pancreatitis attack resolved/improved.    Will try liquid diet today.     Lipase >3K down to 1700 - improved.        HTN - cont amlodipine and toprol XL        Hypothyroidism on Synthroid.            DVT Prophylaxis: lovenox  Diet: Diet NPO Exceptions are: Ice Chips, Sips of Water with Meds, Sips of Clear Liquids  Code Status: Full Code     Dispo - cc.          Luigi Varela MD

## 2021-08-15 NOTE — CONSULTS
[address] [address]                                  CONSULTATION    PATIENT NAME: Monica Mendoza                      :        1945  MED REC NO:   [id number]                          ROOM:       [id number]  ACCOUNT NO:   [de-identified]                           ADMIT DATE: 2021  PROVIDER:     Kassie Akers MD    CONSULT DATE:  2021    HISTORY OF PRESENT ILLNESS:  A 59-year-old female who presents with  epigastric pain. This was acute onset. Describes of cramping  sensation. She is post cholecystectomy. No vomiting. No prior history  of liver or pancreatic disorders. No diarrhea, constipation, or GI  bleeding. Upon presentation to the emergency room, she had a lipase of  over 3000. She had a CT scan suggesting mild pancreatitis. Alkaline  phosphatase is 338, , , and total bilirubin 1.1. She  denies any other concurrent gastrointestinal symptoms. PAST SURGICAL HISTORY:  Blepharoplasty, cholecystectomy, colonoscopy,  hysterectomy, rotator cuff repair. SOCIAL HISTORY:  Former smoker, occasional alcohol. HOME MEDICATIONS:  Zofran, Zetia, Norvasc, Claritin, Pravachol,  Bystolic, Bentyl, Synthroid, multivitamin. ALLERGIES:  MONTELUKAST, SULFA, _____ EXTRACT. REVIEW OF SYSTEMS:  Noncontributory. PHYSICAL EXAMINATION:  VITAL SIGNS:  Stable, afebrile. HEENT:  No conjunctival icterus. CHEST:  Clear. CARDIOVASCULAR:  Regular rate and rhythm. No murmur, gallop, or click. ABDOMEN:  Mild tenderness in the epigastrium. No rebound or mass. No  hepatosplenomegaly. RECTAL:  Deferred. EXTREMITIES:  No clubbing or edema. SKIN:  Normal pallor. NODES:  No adenopathy. LABORATORY DATA:  CT scan, uncomplicated acute pancreatitis. White  count 4300, hemoglobin 12.7. Alkaline phosphatase 338, , AST  785. Total bilirubin 1.1, lipase over 3000.   Ethanol negative. IMPRESSION AND PLAN:  Acute pancreatitis, possibly related to  choledocholithiasis. MRCP results pending. NPO. IV fluids. Monitor  for any septicemia. Repeat liver enzymes in the morning.         Isabel Goodrich MD    D: 08/14/2021 15:53:30       T: 08/15/2021 0:48:19     [id number]  Job#: [id number]     Doc#: [id number]    CC:  Radha Fowler MD

## 2021-08-16 LAB
A/G RATIO: 1 (ref 1.1–2.2)
ALBUMIN SERPL-MCNC: 2.8 G/DL (ref 3.4–5)
ALP BLD-CCNC: 266 U/L (ref 40–129)
ALT SERPL-CCNC: 226 U/L (ref 10–40)
ANION GAP SERPL CALCULATED.3IONS-SCNC: 11 MMOL/L (ref 3–16)
AST SERPL-CCNC: 78 U/L (ref 15–37)
BASOPHILS ABSOLUTE: 0 K/UL (ref 0–0.2)
BASOPHILS RELATIVE PERCENT: 0.1 %
BILIRUB SERPL-MCNC: 0.7 MG/DL (ref 0–1)
BUN BLDV-MCNC: 9 MG/DL (ref 7–20)
CALCIUM SERPL-MCNC: 8.6 MG/DL (ref 8.3–10.6)
CHLORIDE BLD-SCNC: 104 MMOL/L (ref 99–110)
CO2: 22 MMOL/L (ref 21–32)
CREAT SERPL-MCNC: 0.7 MG/DL (ref 0.6–1.2)
EOSINOPHILS ABSOLUTE: 0 K/UL (ref 0–0.6)
EOSINOPHILS RELATIVE PERCENT: 1 %
GFR AFRICAN AMERICAN: >60
GFR NON-AFRICAN AMERICAN: >60
GLOBULIN: 2.9 G/DL
GLUCOSE BLD-MCNC: 98 MG/DL (ref 70–99)
HCT VFR BLD CALC: 32.6 % (ref 36–48)
HEMOGLOBIN: 11.3 G/DL (ref 12–16)
LYMPHOCYTES ABSOLUTE: 0.4 K/UL (ref 1–5.1)
LYMPHOCYTES RELATIVE PERCENT: 7.2 %
MCH RBC QN AUTO: 30.2 PG (ref 26–34)
MCHC RBC AUTO-ENTMCNC: 34.5 G/DL (ref 31–36)
MCV RBC AUTO: 87.4 FL (ref 80–100)
MONOCYTES ABSOLUTE: 0.2 K/UL (ref 0–1.3)
MONOCYTES RELATIVE PERCENT: 3.6 %
NEUTROPHILS ABSOLUTE: 4.5 K/UL (ref 1.7–7.7)
NEUTROPHILS RELATIVE PERCENT: 88.1 %
PDW BLD-RTO: 13.1 % (ref 12.4–15.4)
PLATELET # BLD: 115 K/UL (ref 135–450)
PMV BLD AUTO: 7.3 FL (ref 5–10.5)
POTASSIUM REFLEX MAGNESIUM: 3.7 MMOL/L (ref 3.5–5.1)
RBC # BLD: 3.73 M/UL (ref 4–5.2)
SODIUM BLD-SCNC: 137 MMOL/L (ref 136–145)
TOTAL PROTEIN: 5.7 G/DL (ref 6.4–8.2)
WBC # BLD: 5.1 K/UL (ref 4–11)

## 2021-08-16 PROCEDURE — 6370000000 HC RX 637 (ALT 250 FOR IP): Performed by: INTERNAL MEDICINE

## 2021-08-16 PROCEDURE — 6360000002 HC RX W HCPCS: Performed by: INTERNAL MEDICINE

## 2021-08-16 PROCEDURE — 80053 COMPREHEN METABOLIC PANEL: CPT

## 2021-08-16 PROCEDURE — 2580000003 HC RX 258: Performed by: INTERNAL MEDICINE

## 2021-08-16 PROCEDURE — 2060000000 HC ICU INTERMEDIATE R&B

## 2021-08-16 PROCEDURE — 36415 COLL VENOUS BLD VENIPUNCTURE: CPT

## 2021-08-16 PROCEDURE — 85025 COMPLETE CBC W/AUTO DIFF WBC: CPT

## 2021-08-16 RX ADMIN — POTASSIUM CHLORIDE AND SODIUM CHLORIDE: 900; 150 INJECTION, SOLUTION INTRAVENOUS at 21:38

## 2021-08-16 RX ADMIN — LEVOTHYROXINE SODIUM 125 MCG: 0.12 TABLET ORAL at 06:07

## 2021-08-16 RX ADMIN — HYDRALAZINE HYDROCHLORIDE 10 MG: 20 INJECTION INTRAMUSCULAR; INTRAVENOUS at 03:02

## 2021-08-16 RX ADMIN — ASPIRIN 81 MG: 81 TABLET, CHEWABLE ORAL at 07:46

## 2021-08-16 RX ADMIN — DICYCLOMINE HYDROCHLORIDE 20 MG: 20 TABLET ORAL at 07:46

## 2021-08-16 RX ADMIN — POTASSIUM CHLORIDE AND SODIUM CHLORIDE: 900; 150 INJECTION, SOLUTION INTRAVENOUS at 07:42

## 2021-08-16 RX ADMIN — SODIUM CHLORIDE, PRESERVATIVE FREE 10 ML: 5 INJECTION INTRAVENOUS at 21:37

## 2021-08-16 RX ADMIN — ACETAMINOPHEN 650 MG: 325 TABLET ORAL at 03:02

## 2021-08-16 RX ADMIN — ENOXAPARIN SODIUM 40 MG: 40 INJECTION SUBCUTANEOUS at 07:46

## 2021-08-16 RX ADMIN — SODIUM CHLORIDE, PRESERVATIVE FREE 10 ML: 5 INJECTION INTRAVENOUS at 07:46

## 2021-08-16 RX ADMIN — CETIRIZINE HYDROCHLORIDE 10 MG: 10 TABLET, FILM COATED ORAL at 07:46

## 2021-08-16 RX ADMIN — METOPROLOL SUCCINATE 100 MG: 50 TABLET, EXTENDED RELEASE ORAL at 07:46

## 2021-08-16 RX ADMIN — ACETAMINOPHEN 650 MG: 325 TABLET ORAL at 16:27

## 2021-08-16 RX ADMIN — ONDANSETRON 4 MG: 2 INJECTION INTRAMUSCULAR; INTRAVENOUS at 03:34

## 2021-08-16 RX ADMIN — AMLODIPINE BESYLATE 2.5 MG: 5 TABLET ORAL at 07:46

## 2021-08-16 ASSESSMENT — PAIN SCALES - GENERAL
PAINLEVEL_OUTOF10: 0
PAINLEVEL_OUTOF10: 0
PAINLEVEL_OUTOF10: 3
PAINLEVEL_OUTOF10: 5
PAINLEVEL_OUTOF10: 0
PAINLEVEL_OUTOF10: 0
PAINLEVEL_OUTOF10: 4

## 2021-08-16 ASSESSMENT — PAIN DESCRIPTION - DESCRIPTORS
DESCRIPTORS: ACHING;CRAMPING
DESCRIPTORS: HEADACHE

## 2021-08-16 ASSESSMENT — PAIN DESCRIPTION - PROGRESSION
CLINICAL_PROGRESSION: NOT CHANGED
CLINICAL_PROGRESSION: NOT CHANGED

## 2021-08-16 ASSESSMENT — PAIN DESCRIPTION - ONSET
ONSET: ON-GOING
ONSET: ON-GOING

## 2021-08-16 ASSESSMENT — PAIN DESCRIPTION - FREQUENCY
FREQUENCY: CONTINUOUS
FREQUENCY: CONTINUOUS

## 2021-08-16 ASSESSMENT — PAIN DESCRIPTION - LOCATION
LOCATION: ABDOMEN
LOCATION: HEAD

## 2021-08-16 ASSESSMENT — PAIN DESCRIPTION - PAIN TYPE
TYPE: ACUTE PAIN
TYPE: ACUTE PAIN

## 2021-08-16 NOTE — PROGRESS NOTES
Patient started to feel nauseated when she woke up. IV Zofran given.  Electronically signed by Stephen Mayen RN on 8/16/2021 at 3:39 AM

## 2021-08-16 NOTE — ACP (ADVANCE CARE PLANNING)
Advance Care Planning     Advance Care Planning Activator (Inpatient)  Conversation Note      Date of ACP Conversation: 8/16/2021     Conversation Conducted with: Patient with Decision Making Capacity    ACP Activator: [address] Decision Maker:     Current Designated Health Care Decision Maker:     Primary Decision Maker: Martine Jonah - Child - [phone]    Secondary Decision Maker: Hetal Pedro - [phone]    Today we documented Decision Maker(s) consistent with ACP documents on file. Care Preferences    Ventilation: \"If you were in your present state of health and suddenly became very ill and were unable to breathe on your own, what would your preference be about the use of a ventilator (breathing machine) if it were available to you? \"      Would the patient desire the use of ventilator (breathing machine)?: yes    \"If your health worsens and it becomes clear that your chance of recovery is unlikely, what would your preference be about the use of a ventilator (breathing machine) if it were available to you? \"     Would the patient desire the use of ventilator (breathing machine)?: No      Resuscitation  \"CPR works best to restart the heart when there is a sudden event, like a heart attack, in someone who is otherwise healthy. Unfortunately, CPR does not typically restart the heart for people who have serious health conditions or who are very sick. \"    \"In the event your heart stopped as a result of an underlying serious health condition, would you want attempts to be made to restart your heart (answer \"yes\" for attempt to resuscitate) or would you prefer a natural death (answer \"no\" for do not attempt to resuscitate)? \" yes       [] Yes   [x] No   Educated Patient / Catraino Shaikh regarding differences between Advance Directives and portable DNR orders.     Length of ACP Conversation in minutes:  10  Conversation Outcomes:  [x] ACP discussion completed  [] Existing advance directive reviewed with patient; no changes to patient's previously recorded wishes  [] New Advance Directive completed  [] Portable Do Not Rescitate prepared for Provider review and signature  [] POLST/POST/MOLST/MOST prepared for Provider review and signature      Follow-up plan:    [] Schedule follow-up conversation to continue planning  [] Referred individual to Provider for additional questions/concerns   [] Advised patient/agent/surrogate to review completed ACP document and update if needed with changes in condition, patient preferences or care setting    [x] This note routed to one or more involved healthcare providers  Electronically signed by lEiane Sun on 8/16/2021 at 10:30 AM

## 2021-08-16 NOTE — PROGRESS NOTES
Hospitalist Progress Note      PCP: Jose Stephens MD    Date of Admission: 8/13/2021    Chief Complaint: abd pain nausea emesis, diarrhea      Subjective:     Required a dose of IV lasix yesteday - HTN and showing signs of fluid overload. BP better. More abd discomfort today and poor PO tolerance so far. Medications:  Reviewed    Infusion Medications    0.9% NaCl with KCl 20 mEq 75 mL/hr at 08/16/21 0742    sodium chloride       Scheduled Medications    metoprolol succinate  100 mg Oral Daily    cetirizine  10 mg Oral Daily    levothyroxine  125 mcg Oral QAM AC    aspirin  81 mg Oral Daily    amLODIPine  2.5 mg Oral Daily    sodium chloride flush  10 mL Intravenous 2 times per day    enoxaparin  40 mg Subcutaneous Daily     PRN Meds: hydrALAZINE, albuterol sulfate HFA, dicyclomine, sodium chloride flush, sodium chloride, ondansetron, polyethylene glycol, acetaminophen **OR** acetaminophen      Intake/Output Summary (Last 24 hours) at 8/16/2021 1440  Last data filed at 8/16/2021 1427  Gross per 24 hour   Intake 1993.57 ml   Output 3900 ml   Net -1906.43 ml       Physical Exam Performed:    BP (!) 147/75   Pulse 80   Temp 98.3 °F (36.8 °C) (Oral)   Resp 17   Ht 5' 1\" (1.549 m)   Wt 225 lb 1.4 oz (102.1 kg)   SpO2 94%   BMI 42.53 kg/m²        General appearance: No apparent distress appears stated age and cooperative. HEENT Normal cephalic, atraumatic without obvious deformity. Pupils equal, round, and reactive to light. Extra ocular muscles intact. Conjunctivae/corneas clear. Neck: Supple, No jugular venous distention/bruits. Trachea midline without thyromegaly or adenopathy with full range of motion. Lungs: Clear to auscultation, bilaterally without Rales/Wheezes/Rhonchi with good respiratory effort.   Heart: Regular rate and rhythm with Normal S1/S2 without murmurs, rubs or gallops, point of maximum impulse non-displaced  Abdomen: Soft, non-tender or non-distended as described. XR CHEST PORTABLE   Final Result   Nonspecific minimal left basilar opacity could represent atelectasis in the   absence of clinical concern for infection. Assessment/Plan:    Active Hospital Problems    Diagnosis     Pancreatitis, unspecified pancreatitis type [K85.90]        Acute Idiopathic Pancreatitis. MRCP did not reveal clear signs of obstruction. Possibly passed stone. Cont IVF. May need ERCP - likely once acute pancreatitis attack resolved/improved.    Will try liquid diet today.     Lipase >3K down to 1700 - improved.        HTN - cont amlodipine and toprol XL        Hypothyroidism on Synthroid.            DVT Prophylaxis: lovenox  Diet: Diet NPO Exceptions are: Ice Chips, Sips of Water with Meds, Sips of Clear Liquids  Code Status: Full Code     Dispo - cc.          José Miguel Grier MD

## 2021-08-16 NOTE — PLAN OF CARE
Problem: Pain:  Description: Pain management should include both nonpharmacologic and pharmacologic interventions.   Goal: Pain level will decrease  Description: Pain level will decrease  Outcome: Ongoing  Goal: Control of acute pain  Description: Control of acute pain  Outcome: Ongoing     Problem: Falls - Risk of:  Goal: Will remain free from falls  Description: Will remain free from falls  Outcome: Ongoing  Goal: Absence of physical injury  Description: Absence of physical injury  Outcome: Ongoing     Problem: Anxiety:  Goal: Level of anxiety will decrease  Description: Level of anxiety will decrease  Outcome: Ongoing     Problem: Fluid Volume:  Goal: Ability to achieve a balanced intake and output will improve  Description: Ability to achieve a balanced intake and output will improve  Outcome: Ongoing     Problem: Physical Regulation:  Goal: Ability to maintain clinical measurements within normal limits will improve  Description: Ability to maintain clinical measurements within normal limits will improve  Outcome: Ongoing  Goal: Will show no signs and symptoms of electrolyte imbalance  Description: Will show no signs and symptoms of electrolyte imbalance  Outcome: Ongoing

## 2021-08-16 NOTE — PROGRESS NOTES
[facility]    Afebrile  Increased pain today on solid food  LFTs continue to improve  MRCP - no CBD stone  No history of ethanol abuse  TG level normal  The patient does take HCTZ at home    IMP:  Acute pancreatitis - ?  Idiopathic  A CBD stone which passed is possible given the bump in the LFTs, now decreasing  Doubt HCTZ as she has been on it \"for years\"    REC:  If more pain with solid food, cut back to clear liquids only  Recheck lipase tomorrow

## 2021-08-16 NOTE — CARE COORDINATION
DISCHARGE PLAN: Pt plans to d/c home with her dgtr Luisa Foss upon d/c.  Dgtr will pick this pt up. No d/c needs noted. ___________________________________    Met w/pt to address barriers to dc. HOME: Pt reported that she resides in a ranch home with her dgtr, Luisa Foss. There are 5 KAL. COVID Vaccination: Yes    DME/O2: Stair lift going to the basement, walker, cane, Rolator, and lymphedema pumps. ACTIVE SERVICES: Pt reported that she was independent with all self care PTA and does not anticipate that she will need any assistance upon d/c. TRANSPORTATION: Pt is an active  and stated that her dgtr will transport her home at d/c. PHARMACY: Denies difficulty obtaining/taking meds. Pt has ll meds filled at [facility] on [address]. PCP: Claudene Lux    DEMOGRAPHICS: Verified address/phone number as correct    INSURANCE:  Medicare  PRESCRIPTION COVERAGE: Angely Gomes    HD/PD: No    THERAPY RECS Not ordered    Discharge planning team will remain available for needs. Please consult for any specifics not addressed in this note.     [facility]  [phone]  Electronically signed by Palmer [place] on 8/16/2021 at 10:27 AM

## 2021-08-17 VITALS
HEART RATE: 71 BPM | WEIGHT: 226.63 LBS | HEIGHT: 61 IN | RESPIRATION RATE: 18 BRPM | SYSTOLIC BLOOD PRESSURE: 160 MMHG | DIASTOLIC BLOOD PRESSURE: 78 MMHG | BODY MASS INDEX: 42.79 KG/M2 | OXYGEN SATURATION: 96 % | TEMPERATURE: 98 F

## 2021-08-17 LAB
A/G RATIO: 1.1 (ref 1.1–2.2)
ALBUMIN SERPL-MCNC: 3 G/DL (ref 3.4–5)
ALP BLD-CCNC: 244 U/L (ref 40–129)
ALT SERPL-CCNC: 160 U/L (ref 10–40)
ANION GAP SERPL CALCULATED.3IONS-SCNC: 9 MMOL/L (ref 3–16)
AST SERPL-CCNC: 42 U/L (ref 15–37)
BASOPHILS ABSOLUTE: 0 K/UL (ref 0–0.2)
BASOPHILS RELATIVE PERCENT: 0.8 %
BILIRUB SERPL-MCNC: 0.5 MG/DL (ref 0–1)
BUN BLDV-MCNC: 9 MG/DL (ref 7–20)
CALCIUM SERPL-MCNC: 8.6 MG/DL (ref 8.3–10.6)
CHLORIDE BLD-SCNC: 101 MMOL/L (ref 99–110)
CO2: 22 MMOL/L (ref 21–32)
CREAT SERPL-MCNC: 0.7 MG/DL (ref 0.6–1.2)
EOSINOPHILS ABSOLUTE: 0 K/UL (ref 0–0.6)
EOSINOPHILS RELATIVE PERCENT: 0.5 %
GFR AFRICAN AMERICAN: >60
GFR NON-AFRICAN AMERICAN: >60
GLOBULIN: 2.8 G/DL
GLUCOSE BLD-MCNC: 104 MG/DL (ref 70–99)
HCT VFR BLD CALC: 33.8 % (ref 36–48)
HEMOGLOBIN: 11.2 G/DL (ref 12–16)
LIPASE: 267 U/L (ref 13–60)
LYMPHOCYTES ABSOLUTE: 0.5 K/UL (ref 1–5.1)
LYMPHOCYTES RELATIVE PERCENT: 13.7 %
MCH RBC QN AUTO: 29.2 PG (ref 26–34)
MCHC RBC AUTO-ENTMCNC: 33.2 G/DL (ref 31–36)
MCV RBC AUTO: 88.2 FL (ref 80–100)
MONOCYTES ABSOLUTE: 0.2 K/UL (ref 0–1.3)
MONOCYTES RELATIVE PERCENT: 7 %
NEUTROPHILS ABSOLUTE: 2.7 K/UL (ref 1.7–7.7)
NEUTROPHILS RELATIVE PERCENT: 78 %
PDW BLD-RTO: 13.5 % (ref 12.4–15.4)
PLATELET # BLD: 117 K/UL (ref 135–450)
PMV BLD AUTO: 7.1 FL (ref 5–10.5)
POTASSIUM REFLEX MAGNESIUM: 3.9 MMOL/L (ref 3.5–5.1)
RBC # BLD: 3.83 M/UL (ref 4–5.2)
SODIUM BLD-SCNC: 132 MMOL/L (ref 136–145)
TOTAL PROTEIN: 5.8 G/DL (ref 6.4–8.2)
WBC # BLD: 3.5 K/UL (ref 4–11)

## 2021-08-17 PROCEDURE — 94760 N-INVAS EAR/PLS OXIMETRY 1: CPT

## 2021-08-17 PROCEDURE — 6360000002 HC RX W HCPCS: Performed by: INTERNAL MEDICINE

## 2021-08-17 PROCEDURE — 80053 COMPREHEN METABOLIC PANEL: CPT

## 2021-08-17 PROCEDURE — 85025 COMPLETE CBC W/AUTO DIFF WBC: CPT

## 2021-08-17 PROCEDURE — 6370000000 HC RX 637 (ALT 250 FOR IP): Performed by: INTERNAL MEDICINE

## 2021-08-17 PROCEDURE — 83690 ASSAY OF LIPASE: CPT

## 2021-08-17 PROCEDURE — 36415 COLL VENOUS BLD VENIPUNCTURE: CPT

## 2021-08-17 RX ORDER — HYDROCHLOROTHIAZIDE 25 MG/1
25 TABLET ORAL EVERY MORNING
Qty: 90 TABLET | Refills: 1 | Status: SHIPPED | OUTPATIENT
Start: 2021-08-17 | End: 2022-02-07 | Stop reason: SDUPTHER

## 2021-08-17 RX ADMIN — ACETAMINOPHEN 650 MG: 325 TABLET ORAL at 06:34

## 2021-08-17 RX ADMIN — LEVOTHYROXINE SODIUM 125 MCG: 0.12 TABLET ORAL at 05:22

## 2021-08-17 RX ADMIN — METOPROLOL SUCCINATE 100 MG: 50 TABLET, EXTENDED RELEASE ORAL at 07:47

## 2021-08-17 RX ADMIN — CETIRIZINE HYDROCHLORIDE 10 MG: 10 TABLET, FILM COATED ORAL at 07:47

## 2021-08-17 RX ADMIN — AMLODIPINE BESYLATE 2.5 MG: 5 TABLET ORAL at 07:47

## 2021-08-17 RX ADMIN — HYDRALAZINE HYDROCHLORIDE 10 MG: 20 INJECTION INTRAMUSCULAR; INTRAVENOUS at 05:22

## 2021-08-17 RX ADMIN — ENOXAPARIN SODIUM 40 MG: 40 INJECTION SUBCUTANEOUS at 07:47

## 2021-08-17 RX ADMIN — ASPIRIN 81 MG: 81 TABLET, CHEWABLE ORAL at 07:47

## 2021-08-17 ASSESSMENT — PAIN SCALES - GENERAL
PAINLEVEL_OUTOF10: 3
PAINLEVEL_OUTOF10: 0

## 2021-08-17 NOTE — PLAN OF CARE
Problem: Pain:  Goal: Pain level will decrease  Description: Pain level will decrease  Outcome: Ongoing  Goal: Control of acute pain  Description: Control of acute pain  Outcome: Ongoing  Goal: Control of chronic pain  Description: Control of chronic pain  Outcome: Ongoing     Problem: Falls - Risk of:  Goal: Will remain free from falls  Description: Will remain free from falls  Outcome: Ongoing  Goal: Absence of physical injury  Description: Absence of physical injury  Outcome: Ongoing     Problem: Anxiety:  Goal: Level of anxiety will decrease  Description: Level of anxiety will decrease  Outcome: Ongoing     Problem: Fluid Volume:  Goal: Ability to achieve a balanced intake and output will improve  Description: Ability to achieve a balanced intake and output will improve  Outcome: Ongoing     Problem: Physical Regulation:  Goal: Ability to maintain clinical measurements within normal limits will improve  Description: Ability to maintain clinical measurements within normal limits will improve  Outcome: Ongoing  Goal: Will show no signs and symptoms of electrolyte imbalance  Description: Will show no signs and symptoms of electrolyte imbalance  Outcome: Ongoing

## 2021-08-17 NOTE — CARE COORDINATION
CASE MANAGEMENT DISCHARGE SUMMARY:    DISCHARGE DATE: 8/17/2021    DISCHARGED TO: Home with daughter's support     TRANSPORTATION: Daughter             TIME: around 3-4pm    COMMENTS: Patient ready to discharge home today. Denies needs. Daughter to assist if needed. Daughter to transport home later this afternoon.     Electronically signed by Flor Bales RN Case Management [phone] on 8/17/2021 at 1:02 PM

## 2021-08-17 NOTE — DISCHARGE SUMMARY
Hospital Medicine Discharge Summary    Patient ID: Rudy Flowers      Patient's PCP: Danish Retana MD    Admit Date: 8/13/2021     Discharge Date:   8/17/2021    Admitting Physician: Triston Rodrigues MD     Discharge Physician: Michael Jordan MD     Discharge Diagnoses: Active Hospital Problems    Diagnosis     Pancreatitis, unspecified pancreatitis type [K85.90]        The patient was seen and examined on day of discharge and this discharge summary is in conjunction with any daily progress note from day of discharge. Hospital Course: 67yo woman presented with epigastric abd pain, nausea, emesis and diarrhea. Similar episode 1-1.5 weeks ago - self limited at the time. Acute Idiopathic Pancreatitis. MRCP did not reveal clear signs of obstruction. Possibly passed stone. Cont IVF. May need ERCP - likely once acute pancreatitis attack resolved/improved. Will try liquid diet today.     Lipase >3K down to 1700 to 267 - improved.         HTN - cont amlodipine and toprol XL  On discharge I will resume HCTZ but stop telmisartan (ARBs have been implicated as possibly triggers of acute pancreatitis)         Hypothyroidism on Synthroid.           Physical Exam Performed:     BP (!) 160/78   Pulse 71   Temp 98 °F (36.7 °C) (Axillary)   Resp 18   Ht 5' 1\" (1.549 m)   Wt 226 lb 10.1 oz (102.8 kg)   SpO2 96%   BMI 42.82 kg/m²             General appearance: No apparent distress appears stated age and cooperative. HEENT Normal cephalic, atraumatic without obvious deformity.  Pupils equal, round, and reactive to light.  Extra ocular muscles intact.  Conjunctivae/corneas clear. Neck: Supple, No jugular venous distention/bruits.  Trachea midline without thyromegaly or adenopathy with full range of motion. Lungs: Clear to auscultation, bilaterally without Rales/Wheezes/Rhonchi with good respiratory effort.   Heart: Regular rate and rhythm with Normal S1/S2 without murmurs, rubs or Stable    Discharge Instructions/Follow-up:  PCP 1 week.      Code Status:  Full Code     Activity: activity as tolerated    Diet: regular diet      Discharge Medications:     Current Discharge Medication List           Details   hydroCHLOROthiazide (HYDRODIURIL) 25 MG tablet Take 1 tablet by mouth every morning  Qty: 90 tablet, Refills: 1              Details   terconazole (TERAZOL 7) 0.4 % vaginal cream Place vaginally 2 times daily      betamethasone valerate (VALISONE) 0.1 % cream Apply topically 2 times daily      ondansetron (ZOFRAN-ODT) 4 MG disintegrating tablet Place 4 mg under the tongue every 8 hours as needed for Nausea or Vomiting      ezetimibe (ZETIA) 10 MG tablet TAKE 1 TABLET DAILY  Qty: 90 tablet, Refills: 3    Associated Diagnoses: Hyperlipidemia, unspecified hyperlipidemia type      amLODIPine (NORVASC) 2.5 MG tablet TAKE 1 TABLET BY MOUTH DAILY  Qty: 90 tablet, Refills: 3    Comments: **Patient requests 90 days supply**  Associated Diagnoses: Essential hypertension, benign      loratadine (CLARITIN) 10 MG tablet Take 10 mg by mouth daily      pravastatin (PRAVACHOL) 20 MG tablet Take 1 tablet by mouth nightly  Qty: 90 tablet, Refills: 3      nebivolol (BYSTOLIC) 10 MG tablet TAKE 1 TABLET DAILY  Qty: 90 tablet, Refills: 3      dicyclomine (BENTYL) 20 MG tablet Take 1 tablet by mouth every 6 hours  Qty: 120 tablet, Refills: 1      levothyroxine (SYNTHROID) 125 MCG tablet TAKE 1 TABLET BY MOUTH DAILY  Qty: 90 tablet, Refills: 3    Comments: **Patient requests 90 days supply**      Multiple Vitamins-Minerals (THERAPEUTIC MULTIVITAMIN-MINERALS) tablet Take 1 tablet by mouth daily      vitamin C (ASCORBIC ACID) 500 MG tablet Take 500 mg by mouth daily      Cholecalciferol (VITAMIN D3) 125 MCG (5000 UT) TABS Take 5,000 Units by mouth daily       MAGNESIUM PO Take 400 mg by mouth daily       PROAIR  (90 Base) MCG/ACT inhaler USE 2 INHALATIONS ORALLY   INTO THE LUNGS EVERY 6     HOURS AS NEEDEDFOR WHEEZING  Qty: 8.5 g, Refills: 4    Associated Diagnoses: Mild intermittent asthma without complication      cyanocobalamin ([employer] VITAMIN B12) 1000 MCG tablet Take 1 tablet by mouth daily  Qty: 30 tablet, Refills: 3      aspirin 81 MG tablet Take 81 mg by mouth daily. Time Spent on discharge is more than 30 minutes in the examination, evaluation, counseling and review of medications and discharge plan. Signed:    Rosaura Kapoor MD   8/17/2021      Thank you Kingston Blood MD for the opportunity to be involved in this patient's care. If you have any questions or concerns please feel free to contact me at [phone].

## 2021-08-18 ENCOUNTER — CARE COORDINATION (OUTPATIENT)
Dept: CASE MANAGEMENT | Age: 76
End: 2021-08-18

## 2021-08-18 NOTE — CARE COORDINATION
[facility] Transitions Initial Follow Up Call    Call within 2 business days of discharge: Yes    Patient: Jose Alfredo Last Patient : 1945   MRN: <[id number]>  Reason for Admission: pancreatitis  Discharge Date: 21 RARS: Readmission Risk Score: 13      Last Discharge [facility]       Complaint Diagnosis Description Type Department Provider    21 Chest Pain Acute pancreatitis, unspecified complication status, unspecified pancreatitis type . .. ED to Hosp-Admission (Discharged) (ADMITTED) DARIO 5N Hemant Grove MD; Zion Stoddard... Spoke with: SUNNY    Facility: [facility]    Attempted to reach patient via phone for initial post hospital transition call. VM left stating purpose of call along with my contact information requesting a return call.    Sandy Segura LPN [address] [place] [address]      Care Transitions 24 Hour Call    Do you have all of your prescriptions and are they filled?: No  Were you discharged with any Home Care or Post Acute Services: No  Care Transitions Interventions         Follow Up  Future Appointments   Date Time Provider Lillian Lal   2021  2:20 PM Dion Ortiz MD [facility] PULM YONNY   2022 10:00 AM Mary Anne Bar MD [id number]       Sandy Segura LPN

## 2021-08-19 ENCOUNTER — CARE COORDINATION (OUTPATIENT)
Dept: CASE MANAGEMENT | Age: 76
End: 2021-08-19

## 2021-08-19 NOTE — CARE COORDINATION
Carla 45 Transitions Initial Follow Up Call    Call within 2 business days of discharge: Yes    Patient: Sushila Marcum Patient : 1945   MRN: <[id number]>  Reason for Admission: pancreatitis  Discharge Date: 21 RARS: Readmission Risk Score: 13      Last Discharge [facility]       Complaint Diagnosis Description Type Department Provider    21 Chest Pain Acute pancreatitis, unspecified complication status, unspecified pancreatitis type . .. ED to Hosp-Admission (Discharged) (ADMITTED) DARIO 5N Mireya Monson MD; Mulugeta Marcial... Spoke with: SUNNY    Facility: [facility]    Second and final attempt to reach patient via phone for initial post hospital transition call. VM left stating purpose of call along with my contact information requesting a return call. Episode ended d/t unsuccessful contact.    Rae Palencia LPN [address] [place] [phone]      Care Transitions 24 Hour Call    Do you have all of your prescriptions and are they filled?: No  Care Transitions Interventions         Follow Up  Future Appointments   Date Time Provider Lillian Lal   2021  2:20 PM Leopold Commodore, MD [facility] BLANCAS   2022 10:00 AM Ayo Urias MD [id number]       Rae Palencia LPN

## 2021-08-21 NOTE — PROGRESS NOTES
Physician Progress Note      PATIENTDabasia Caba  CSN #:                  [id number]  :                       1945  ADMIT DATE:       2021 9:20 PM  [address] [place] DATE:        2021 3:52 PM  RESPONDING  PROVIDER #:        Micki Canales MD          QUERY TEXT:    Pt admitted with pancreatitis. Pt noted to have Na of 130 on admission. If   possible, please document in the progress notes and discharge summary if you   are evaluating and / or treating any of the following: The medical record reflects the following:  Risk Factors: pancreatitis - nausea and vomiting, diarrhea  Clinical Indicators: Na 130 on admission  Treatment: IVF  Options provided:  -- Hyponatremia  -- Pseudohyponatremia  -- Clinically insignificant low serum sodium  -- Other - I will add my own diagnosis  -- Disagree - Not applicable / Not valid  -- Disagree - Clinically unable to determine / Unknown  -- Refer to Clinical Documentation Reviewer    PROVIDER RESPONSE TEXT:    This patient has low serum sodium which is clinically insignificant.     Query created by: Milly Guzman on 2021 12:29 PM      Electronically signed by:  Micki Canales MD 2021 7:00 AM

## 2021-08-26 PROBLEM — D69.6 THROMBOCYTOPENIA, UNSPECIFIED (HCC): Status: ACTIVE | Noted: 2021-08-26

## 2021-08-30 ENCOUNTER — OFFICE VISIT (OUTPATIENT)
Dept: PULMONOLOGY | Age: 76
End: 2021-08-30
Payer: MEDICARE

## 2021-08-30 VITALS
HEIGHT: 61 IN | OXYGEN SATURATION: 96 % | BODY MASS INDEX: 41.72 KG/M2 | SYSTOLIC BLOOD PRESSURE: 132 MMHG | WEIGHT: 221 LBS | HEART RATE: 68 BPM | TEMPERATURE: 97.5 F | DIASTOLIC BLOOD PRESSURE: 70 MMHG | RESPIRATION RATE: 14 BRPM

## 2021-08-30 DIAGNOSIS — J30.89 OTHER ALLERGIC RHINITIS: ICD-10-CM

## 2021-08-30 DIAGNOSIS — G47.33 OSA (OBSTRUCTIVE SLEEP APNEA): ICD-10-CM

## 2021-08-30 DIAGNOSIS — J45.30 MILD PERSISTENT ASTHMA WITHOUT COMPLICATION: ICD-10-CM

## 2021-08-30 PROCEDURE — G8399 PT W/DXA RESULTS DOCUMENT: HCPCS | Performed by: INTERNAL MEDICINE

## 2021-08-30 PROCEDURE — 1090F PRES/ABSN URINE INCON ASSESS: CPT | Performed by: INTERNAL MEDICINE

## 2021-08-30 PROCEDURE — 1036F TOBACCO NON-USER: CPT | Performed by: INTERNAL MEDICINE

## 2021-08-30 PROCEDURE — G8417 CALC BMI ABV UP PARAM F/U: HCPCS | Performed by: INTERNAL MEDICINE

## 2021-08-30 PROCEDURE — 1123F ACP DISCUSS/DSCN MKR DOCD: CPT | Performed by: INTERNAL MEDICINE

## 2021-08-30 PROCEDURE — 1111F DSCHRG MED/CURRENT MED MERGE: CPT | Performed by: INTERNAL MEDICINE

## 2021-08-30 PROCEDURE — G8427 DOCREV CUR MEDS BY ELIG CLIN: HCPCS | Performed by: INTERNAL MEDICINE

## 2021-08-30 PROCEDURE — 4040F PNEUMOC VAC/ADMIN/RCVD: CPT | Performed by: INTERNAL MEDICINE

## 2021-08-30 PROCEDURE — 99214 OFFICE O/P EST MOD 30 MIN: CPT | Performed by: INTERNAL MEDICINE

## 2021-08-30 ASSESSMENT — SLEEP AND FATIGUE QUESTIONNAIRES
HOW LIKELY ARE YOU TO NOD OFF OR FALL ASLEEP WHEN YOU ARE A PASSENGER IN A CAR FOR AN HOUR WITHOUT A BREAK: 0
HOW LIKELY ARE YOU TO NOD OFF OR FALL ASLEEP WHILE SITTING QUIETLY AFTER LUNCH WITHOUT ALCOHOL: 0
ESS TOTAL SCORE: 2
HOW LIKELY ARE YOU TO NOD OFF OR FALL ASLEEP IN A CAR, WHILE STOPPED FOR A FEW MINUTES IN TRAFFIC: 0
HOW LIKELY ARE YOU TO NOD OFF OR FALL ASLEEP WHILE SITTING INACTIVE IN A PUBLIC PLACE: 0
HOW LIKELY ARE YOU TO NOD OFF OR FALL ASLEEP WHILE SITTING AND READING: 0
HOW LIKELY ARE YOU TO NOD OFF OR FALL ASLEEP WHILE SITTING AND TALKING TO SOMEONE: 0
HOW LIKELY ARE YOU TO NOD OFF OR FALL ASLEEP WHILE LYING DOWN TO REST IN THE AFTERNOON WHEN CIRCUMSTANCES PERMIT: 1
HOW LIKELY ARE YOU TO NOD OFF OR FALL ASLEEP WHILE WATCHING TV: 1

## 2021-08-30 ASSESSMENT — ASTHMA QUESTIONNAIRES
ACT_TOTALSCORE: 19
QUESTION_1 LAST FOUR WEEKS HOW MUCH OF THE TIME DID YOUR ASTHMA KEEP YOU FROM GETTING AS MUCH DONE AT WORK, SCHOOL OR AT HOME: 4
QUESTION_4 LAST FOUR WEEKS HOW OFTEN HAVE YOU USED YOUR RESCUE INHALER OR NEBULIZER MEDICATION (SUCH AS ALBUTEROL): 3
QUESTION_3 LAST FOUR WEEKS HOW OFTEN DID YOUR ASTHMA SYMPTOMS (WHEEZING, COUGHING, SHORTNESS OF BREATH, CHEST TIGHTNESS OR PAIN) WAKE YOU UP AT NIGHT OR EARLIER THAN USUAL IN THE MORNING: 4
QUESTION_2 LAST FOUR WEEKS HOW OFTEN HAVE YOU HAD SHORTNESS OF BREATH: 4
QUESTION_5 LAST FOUR WEEKS HOW WOULD YOU RATE YOUR ASTHMA CONTROL: 4

## 2021-08-30 NOTE — PROGRESS NOTES
REASON FOR CONSULTATION/CC:    Chief Complaint   Patient presents with    Asthma     f/u Asthma    Sleep Apnea     f/u cpap        Consult at request of   Brandy Alpers, MD    PCP: Brandy Alpers, MD    HISTORY OF PRESENT ILLNESS: Chica Hayes is a 67y.o. year old female with a history of asthma who presents             Nasima  Using cpap nightly > 4 hours per day. No issues. No complaints mask fit or humidification issues. Knows to changes mask and hoses every 6 months. complains of dry mouth with mouth open       Asthma   Off medications, controlled      allergic rhinitis  Controlled. Claritin       Recently admitted for pancreatitis               ASTHMA CONTROL TEST 8/30/2021 8/10/2020 9/24/2019 4/16/2019 2/11/2019 8/15/2018   In the past 4 weeks, how much of the time did your asthma keep you from getting as much done at work, school or at home? 4 4 4 4 3 3   During the past 4 weeks, how often have you had shortness of breath? 4 4 4 4 4 3   During the past 4 weeks, how often did your asthma symptoms (wheezing, coughing, shortness of breath, chest tightness or pain) wake you up at night or earlier than usual in the morning? 4 4 5 5 4 2   During the past 4 weeks, how often have you used your rescue inhaler or nebulizer medication (such as albuterol)? 3 4 4 5 4 3   How would you rate your asthma control during the past 4 weeks? 4 4 5 5 4 3   Asthma Control Test Total Score 19 20 22 23 19 14        REVIEW OF SYSTEMS:  Constitutional: Negative for fever    HENT: Negative for sore throat  Respiratory: Negative for dyspnea, cough  Cardiovascular: Negative for chest pain  Gastrointestinal: Negative for vomiting, diarrhea   Skin: Negative for rash  Psychiatric/Behavorial: Negative for anxiety     Objective:   PHYSICAL EXAM:  Blood pressure 132/70, pulse 68, temperature 97.5 °F (36.4 °C), temperature source Infrared, resp.  rate 14, height 5' 1\" (1.549 m), weight 221 lb (100.2 kg), SpO2 96 %, not currently breastfeeding.'  Gen: No distress. Body mass index is 41.76 kg/m². ENT:   Resp: No accessory muscle use. No crackles. No wheezes. No rhonchi. CV: Regular rate. Regular rhythm. No murmur or rub. No edema. Skin: Warm, dry, normal texture and turgor. No nodule on exposed extremities. M/S: No cyanosis. No clubbing. No joint deformity. Psych: Oriented x 3. No anxiety. Awake. Alert. Intact judgement and insight. Good Mood / Affect. Memory appears in tact        Data Reviewed:        Assessment:     · allergic rhinitis   · Asthma    Plan:        Problem List Items Addressed This Visit     Other allergic rhinitis     Controlled with daily Claritin         LYNNE (obstructive sleep apnea)      Lolis Abel  is deriving benefit from PAP demonstrated by improved [place], AHI, symptoms. PAP download information:  Usage > 4 hours  96 %   Pressure setting  16.2 cwp    AHI with usage  1.8                 Mild persistent asthma without complication      Controlled off medications.

## 2021-08-31 NOTE — ASSESSMENT & PLAN NOTE
Asiancjayne Moe  is deriving benefit from PAP demonstrated by improved [place], AHI, symptoms.   PAP download information:  Usage > 4 hours  96 %   Pressure setting  16.2 cwp    AHI with usage  1.8

## 2021-09-08 PROBLEM — J30.89 OTHER ALLERGIC RHINITIS: Status: RESOLVED | Noted: 2018-05-15 | Resolved: 2021-09-08

## 2021-09-08 PROBLEM — E05.90 PRETIBIAL MYXEDEMA: Status: RESOLVED | Noted: 2018-04-11 | Resolved: 2021-09-08

## 2021-11-11 PROBLEM — K85.90 PANCREATITIS, UNSPECIFIED PANCREATITIS TYPE: Status: RESOLVED | Noted: 2021-08-14 | Resolved: 2021-11-11

## 2021-12-17 ENCOUNTER — APPOINTMENT (OUTPATIENT)
Dept: CT IMAGING | Age: 76
End: 2021-12-17
Payer: MEDICARE

## 2021-12-17 ENCOUNTER — HOSPITAL ENCOUNTER (EMERGENCY)
Age: 76
Discharge: HOME | End: 2021-12-17
Payer: MEDICARE

## 2021-12-17 VITALS
SYSTOLIC BLOOD PRESSURE: 139 MMHG | BODY MASS INDEX: 41 KG/M2 | TEMPERATURE: 98.3 F | DIASTOLIC BLOOD PRESSURE: 63 MMHG | RESPIRATION RATE: 16 BRPM | HEIGHT: 61 IN | OXYGEN SATURATION: 94 % | WEIGHT: 217.15 LBS | HEART RATE: 69 BPM

## 2021-12-17 DIAGNOSIS — K63.89 EPIPLOIC APPENDAGITIS: Primary | ICD-10-CM

## 2021-12-17 DIAGNOSIS — N30.00 ACUTE CYSTITIS WITHOUT HEMATURIA: ICD-10-CM

## 2021-12-17 LAB
A/G RATIO: 1.3 (ref 1.1–2.2)
ALBUMIN SERPL-MCNC: 4.2 G/DL (ref 3.4–5)
ALP BLD-CCNC: 113 U/L (ref 40–129)
ALT SERPL-CCNC: 17 U/L (ref 10–40)
ANION GAP SERPL CALCULATED.3IONS-SCNC: 12 MMOL/L (ref 3–16)
AST SERPL-CCNC: 13 U/L (ref 15–37)
BACTERIA: ABNORMAL /HPF
BASOPHILS ABSOLUTE: 0 K/UL (ref 0–0.2)
BASOPHILS RELATIVE PERCENT: 0.6 %
BILIRUB SERPL-MCNC: 0.3 MG/DL (ref 0–1)
BILIRUBIN URINE: NEGATIVE
BLOOD, URINE: ABNORMAL
BUN BLDV-MCNC: 16 MG/DL (ref 7–20)
CALCIUM SERPL-MCNC: 9.5 MG/DL (ref 8.3–10.6)
CHLORIDE BLD-SCNC: 99 MMOL/L (ref 99–110)
CLARITY: ABNORMAL
CO2: 30 MMOL/L (ref 21–32)
COLOR: YELLOW
CREAT SERPL-MCNC: 0.7 MG/DL (ref 0.6–1.2)
EOSINOPHILS ABSOLUTE: 0 K/UL (ref 0–0.6)
EOSINOPHILS RELATIVE PERCENT: 0.5 %
EPITHELIAL CELLS, UA: 0 /HPF (ref 0–5)
GFR AFRICAN AMERICAN: >60
GFR NON-AFRICAN AMERICAN: >60
GLUCOSE BLD-MCNC: 90 MG/DL (ref 70–99)
GLUCOSE URINE: NEGATIVE MG/DL
HCT VFR BLD CALC: 43.2 % (ref 36–48)
HEMOGLOBIN: 14.1 G/DL (ref 12–16)
HYALINE CASTS: 0 /LPF (ref 0–8)
KETONES, URINE: NEGATIVE MG/DL
LACTIC ACID, SEPSIS: 1 MMOL/L (ref 0.4–1.9)
LEUKOCYTE ESTERASE, URINE: ABNORMAL
LIPASE: 34 U/L (ref 13–60)
LYMPHOCYTES ABSOLUTE: 1.7 K/UL (ref 1–5.1)
LYMPHOCYTES RELATIVE PERCENT: 22.5 %
MAGNESIUM: 1.7 MG/DL (ref 1.8–2.4)
MCH RBC QN AUTO: 28.8 PG (ref 26–34)
MCHC RBC AUTO-ENTMCNC: 32.6 G/DL (ref 31–36)
MCV RBC AUTO: 88.4 FL (ref 80–100)
MICROSCOPIC EXAMINATION: YES
MONOCYTES ABSOLUTE: 0.7 K/UL (ref 0–1.3)
MONOCYTES RELATIVE PERCENT: 10.1 %
NEUTROPHILS ABSOLUTE: 4.9 K/UL (ref 1.7–7.7)
NEUTROPHILS RELATIVE PERCENT: 66.3 %
NITRITE, URINE: POSITIVE
PDW BLD-RTO: 13.6 % (ref 12.4–15.4)
PH UA: 6 (ref 5–8)
PLATELET # BLD: 190 K/UL (ref 135–450)
PMV BLD AUTO: 7.4 FL (ref 5–10.5)
POTASSIUM REFLEX MAGNESIUM: 3.5 MMOL/L (ref 3.5–5.1)
PROTEIN UA: NEGATIVE MG/DL
RBC # BLD: 4.89 M/UL (ref 4–5.2)
RBC UA: 1 /HPF (ref 0–4)
SODIUM BLD-SCNC: 141 MMOL/L (ref 136–145)
SPECIFIC GRAVITY UA: 1.01 (ref 1–1.03)
TOTAL PROTEIN: 7.4 G/DL (ref 6.4–8.2)
URINE REFLEX TO CULTURE: YES
URINE TYPE: ABNORMAL
UROBILINOGEN, URINE: 0.2 E.U./DL
WBC # BLD: 7.5 K/UL (ref 4–11)
WBC UA: 26 /HPF (ref 0–5)

## 2021-12-17 PROCEDURE — 83690 ASSAY OF LIPASE: CPT

## 2021-12-17 PROCEDURE — 83735 ASSAY OF MAGNESIUM: CPT

## 2021-12-17 PROCEDURE — 81001 URINALYSIS AUTO W/SCOPE: CPT

## 2021-12-17 PROCEDURE — 36415 COLL VENOUS BLD VENIPUNCTURE: CPT

## 2021-12-17 PROCEDURE — 96374 THER/PROPH/DIAG INJ IV PUSH: CPT

## 2021-12-17 PROCEDURE — 6360000004 HC RX CONTRAST MEDICATION: Performed by: PHYSICIAN ASSISTANT

## 2021-12-17 PROCEDURE — 99284 EMERGENCY DEPT VISIT MOD MDM: CPT

## 2021-12-17 PROCEDURE — 74177 CT ABD & PELVIS W/CONTRAST: CPT

## 2021-12-17 PROCEDURE — 87086 URINE CULTURE/COLONY COUNT: CPT

## 2021-12-17 PROCEDURE — 6370000000 HC RX 637 (ALT 250 FOR IP): Performed by: PHYSICIAN ASSISTANT

## 2021-12-17 PROCEDURE — 87077 CULTURE AEROBIC IDENTIFY: CPT

## 2021-12-17 PROCEDURE — 80053 COMPREHEN METABOLIC PANEL: CPT

## 2021-12-17 PROCEDURE — 96375 TX/PRO/DX INJ NEW DRUG ADDON: CPT

## 2021-12-17 PROCEDURE — 87186 SC STD MICRODIL/AGAR DIL: CPT

## 2021-12-17 PROCEDURE — 2580000003 HC RX 258: Performed by: PHYSICIAN ASSISTANT

## 2021-12-17 PROCEDURE — 6360000002 HC RX W HCPCS: Performed by: PHYSICIAN ASSISTANT

## 2021-12-17 PROCEDURE — 85025 COMPLETE CBC W/AUTO DIFF WBC: CPT

## 2021-12-17 PROCEDURE — 83605 ASSAY OF LACTIC ACID: CPT

## 2021-12-17 RX ORDER — CEFUROXIME AXETIL 250 MG/1
250 TABLET ORAL ONCE
Status: COMPLETED | OUTPATIENT
Start: 2021-12-17 | End: 2021-12-17

## 2021-12-17 RX ORDER — MORPHINE SULFATE 2 MG/ML
2 INJECTION, SOLUTION INTRAMUSCULAR; INTRAVENOUS ONCE
Status: COMPLETED | OUTPATIENT
Start: 2021-12-17 | End: 2021-12-17

## 2021-12-17 RX ORDER — CEFUROXIME AXETIL 250 MG/1
250 TABLET ORAL 2 TIMES DAILY
Qty: 20 TABLET | Refills: 0 | Status: SHIPPED | OUTPATIENT
Start: 2021-12-17 | End: 2021-12-27 | Stop reason: CLARIF

## 2021-12-17 RX ORDER — 0.9 % SODIUM CHLORIDE 0.9 %
500 INTRAVENOUS SOLUTION INTRAVENOUS ONCE
Status: COMPLETED | OUTPATIENT
Start: 2021-12-17 | End: 2021-12-17

## 2021-12-17 RX ORDER — ONDANSETRON 2 MG/ML
4 INJECTION INTRAMUSCULAR; INTRAVENOUS ONCE
Status: COMPLETED | OUTPATIENT
Start: 2021-12-17 | End: 2021-12-17

## 2021-12-17 RX ORDER — IBUPROFEN 600 MG/1
600 TABLET ORAL EVERY 8 HOURS PRN
Qty: 20 TABLET | Refills: 0 | Status: SHIPPED | OUTPATIENT
Start: 2021-12-17 | End: 2022-03-22 | Stop reason: CLARIF

## 2021-12-17 RX ADMIN — IOPAMIDOL 75 ML: 755 INJECTION, SOLUTION INTRAVENOUS at 15:29

## 2021-12-17 RX ADMIN — CEFUROXIME AXETIL 250 MG: 250 TABLET ORAL at 17:17

## 2021-12-17 RX ADMIN — MORPHINE SULFATE 2 MG: 2 INJECTION, SOLUTION INTRAMUSCULAR; INTRAVENOUS at 15:18

## 2021-12-17 RX ADMIN — ONDANSETRON 4 MG: 2 INJECTION INTRAMUSCULAR; INTRAVENOUS at 15:18

## 2021-12-17 RX ADMIN — SODIUM CHLORIDE 500 ML: 9 INJECTION, SOLUTION INTRAVENOUS at 15:17

## 2021-12-17 ASSESSMENT — PAIN DESCRIPTION - LOCATION
LOCATION: ABDOMEN
LOCATION: ABDOMEN

## 2021-12-17 ASSESSMENT — PAIN DESCRIPTION - PAIN TYPE
TYPE: ACUTE PAIN
TYPE: ACUTE PAIN

## 2021-12-17 ASSESSMENT — PAIN SCALES - GENERAL
PAINLEVEL_OUTOF10: 8
PAINLEVEL_OUTOF10: 8
PAINLEVEL_OUTOF10: 6

## 2021-12-17 ASSESSMENT — PAIN DESCRIPTION - ORIENTATION
ORIENTATION: OTHER (COMMENT)
ORIENTATION: RIGHT;MID;LOWER

## 2021-12-17 ASSESSMENT — PAIN DESCRIPTION - FREQUENCY: FREQUENCY: CONTINUOUS

## 2021-12-17 ASSESSMENT — PAIN DESCRIPTION - DESCRIPTORS: DESCRIPTORS: ACHING

## 2021-12-17 NOTE — ED TRIAGE NOTES
Pt reports upper abd pain since yesterday, denies nausea, emesis or diarrhea, no sxs of distress noted.

## 2021-12-17 NOTE — ED PROVIDER NOTES
[facility]        Pt Name: Michael Michele  MRN: [id number]  [place] 1945  Date of evaluation: 12/17/2021  Provider: NORMA Back      ADVANCED PRACTICE PROVIDER, I HAVE EVALUATED THIS PATIENT    CHIEF COMPLAINT     Abdominal pain      HISTORY OF PRESENT ILLNESS  (Location/Symptom, Timing/Onset, Context/Setting, Quality, Duration,Modifying Factors, Severity.)   Michael Michele is a 67 y.o. female who presents to the emergencydepartment for abdominal pain located in the right mid abdomen and right lower abdomen that is been constant since yesterday. Described as sharp. Has no alleviating or aggravating factors. No prior episodes. Has not taken anything for the pain yet. She denies nausea, vomiting, diarrhea. Last had a bowel movement this morning but reports she did have to strain and was only a small amount. No bright red blood per rectum or black tarry stool. She reports her last normal bowel movement was 2 days ago. She denies dysuria, hematuria, urinary frequency. Denies fevers, chills, chest pain, shortness of breath. Abdominal surgery she has had a cholecystectomy and a hysterectomy. Nursing Notes were reviewed and I agree. REVIEW OF SYSTEMS    (2-9 systems for level 4, 10 or more for level 5)   Review of Systems     Pertinent positives and negatives as per HPI.        PAST MEDICAL HISTORY         Diagnosis Date    Asthma     Diverticulitis     Graves disease     Hyperlipidemia     Hypertension     Hyperthyroidism     Hypothyroidism     Lymphedema of both lower extremities 2/7/2020    Mild persistent asthma without complication     Osteoarthritis     Peptic ulcer, unspecified site, unspecified as acute or chronic, without mention of hemorrhage or perforation     Prolonged emergence from general anesthesia     Sleep apnea     CPAP at night    Venous insufficiency     Wears glasses SURGICAL HISTORY         Procedure Laterality Date    BLEPHAROPLASTY Left     CHOLECYSTECTOMY  2001    COLONOSCOPY  2011    normal-daniel    FOOT SURGERY Left     spurs removed from left heel    HYSTERECTOMY      KNEE ARTHROSCOPY Left     ROTATOR CUFF REPAIR Right        CURRENT MEDICATIONS       Discharge Medication List as of 12/17/2021  5:24 PM      CONTINUE these medications which have NOT CHANGED    Details   ALPRAZolam (XANAX) 0.5 MG tablet Take 0.5 mg by mouth nightly as needed for Sleep. Historical Med      dicyclomine (BENTYL) 20 MG tablet Take 1 tablet by mouth every 6 hours, Disp-30 tablet, R-1Normal      amLODIPine (NORVASC) 5 MG tablet TAKE 1 TABLET BY MOUTH DAILY, Disp-90 tablet, R-3**Patient requests 90 days supply**Normal      levothyroxine (SYNTHROID) 125 MCG tablet Take 1 tablet by mouth Daily, Disp-90 tablet, R-3**Patient requests 90 days supply**Normal      nebivolol (BYSTOLIC) 10 MG tablet TAKE 1 TABLET DAILY, Disp-90 tablet, R-3Normal      hydroCHLOROthiazide (HYDRODIURIL) 25 MG tablet Take 1 tablet by mouth every morning, Disp-90 tablet, R-1Normal      terconazole (TERAZOL 7) 0.4 % vaginal cream Place vaginally 2 times dailyHistorical Med      betamethasone valerate (VALISONE) 0.1 % cream Apply topically 2 times daily, Topical, 2 TIMES DAILY Starting Thu 8/5/2021, Historical Med      ezetimibe (ZETIA) 10 MG tablet TAKE 1 TABLET DAILY, Disp-90 tablet, R-3Normal      loratadine (CLARITIN) 10 MG tablet Take 10 mg by mouth dailyHistorical Med      pravastatin (PRAVACHOL) 20 MG tablet Take 1 tablet by mouth nightly, Disp-90 tablet, R-3Normal      Multiple Vitamins-Minerals (THERAPEUTIC MULTIVITAMIN-MINERALS) tablet Take 1 tablet by mouth dailyHistorical Med      vitamin C (ASCORBIC ACID) 500 MG tablet Take 500 mg by mouth dailyHistorical Med      Cholecalciferol (VITAMIN D3) 125 MCG (5000 UT) TABS Take 5,000 Units by mouth daily Historical Med      MAGNESIUM PO Take 400 mg by mouth daily Historical Med      PROAIR  (90 Base) MCG/ACT inhaler USE 2 INHALATIONS ORALLY   INTO THE LUNGS EVERY 6     HOURS AS NEEDEDFOR        WHEEZING, Disp-8.5 g, R-4Normal      cyanocobalamin ([employer] VITAMIN B12) 1000 MCG tablet Take 1 tablet by mouth daily, Disp-30 tablet, R-3      aspirin 81 MG tablet Take 81 mg by mouth daily. ALLERGIES     Montelukast sodium, Sulfa antibiotics, Valium [diazepam], and Strawberry extract    FAMILY HISTORY           Problem Relation Age of Onset    Hypertension Mother     Colon Cancer Father     Diabetes Sister      Family Status   Relation Name Status    Mother      Father      Sister  (Not Specified)        SOCIAL HISTORY      reports that she quit smoking about 57 years ago. Her smoking use included cigarettes. She started smoking about 60 years ago. She has a 0.75 pack-year smoking history. She has never used smokeless tobacco. She reports current alcohol use. She reports that she does not use drugs. PHYSICAL EXAM    (up to 7 for level 4, 8 or more for level 5)     ED Triage Vitals [21 1143]   BP Temp Temp src Pulse Resp SpO2 Height Weight   (!) 182/61 98.3 °F (36.8 °C) -- 71 15 95 % 5' 1\" (1.549 m) 217 lb 2.5 oz (98.5 kg)       Physical Exam  Constitutional:       General: She is not in acute distress. Appearance: Normal appearance. She is well-developed. She is not ill-appearing, toxic-appearing or diaphoretic. HENT:      Head: Normocephalic and atraumatic. Cardiovascular:      Rate and Rhythm: Normal rate and regular rhythm. Heart sounds: Normal heart sounds. Pulmonary:      Effort: Pulmonary effort is normal. No respiratory distress. Breath sounds: Normal breath sounds. Abdominal:      General: There is distension. Palpations: Abdomen is soft. Tenderness: There is abdominal tenderness. There is guarding. There is no rebound. Hernia: A hernia is present.       Comments: Hypoactive bowel sounds    Significant TTP of the periumbilical and entire right side of the abdomen, worst in the right lower abdomen. Feels like a hernia in the right infraumbilical area. Non reducible and significant TTP. No overlying erythema    Rest of the abdomen has moderate TTP with guarding and mild distension   Musculoskeletal:         General: Normal range of motion. Cervical back: Normal range of motion and neck supple. Skin:     General: Skin is warm. Neurological:      Mental Status: She is alert. Psychiatric:         Mood and Affect: Mood normal.         Behavior: Behavior normal.         Thought Content: Thought content normal.         Judgment: Judgment normal.         DIFFERENTIAL DIAGNOSIS   Appendicitis, Small Bowel Obstruction, Pancreatitis, Cholesystitis, Hepatitis, Aortic Dissection, DKA, Pylonephritis, Kidney Stone  Incarcerated hernia, other    DIAGNOSTICRESULTS         RADIOLOGY:   Non-plain film images such as CT, Ultrasound and MRI are read by the radiologist. Plain radiographic images are visualized and preliminarily interpreted by NORMA Ferris with the below findings:      Interpretation per the Radiologist below, if available at the time of this note:    CT ABDOMEN PELVIS W IV CONTRAST Additional Contrast? None   Preliminary Result   1. Focal inflammation adjacent to the mid ascending colon most consistent   with epiploic appendagitis. Colonic diverticulosis with no convincing   evidence of diverticulitis. 2. Status post cholecystectomy and hysterectomy. No evidence of appendicitis.                LABS:  Labs Reviewed   COMPREHENSIVE METABOLIC PANEL W/ REFLEX TO MG FOR LOW K - Abnormal; Notable for the following components:       Result Value    AST 13 (*)     All other components within normal limits    Narrative:     Performed at:  [facility]  [address] [facility] [address]   Phone [phone]   MAGNESIUM - Abnormal; Notable for the following components:    Magnesium 1.70 (*)     All other components within normal limits    Narrative:     Performed at:  [address] [place]   Phone [phone]   URINE RT REFLEX TO CULTURE - Abnormal; Notable for the following components:    Clarity, UA CLOUDY (*)     Blood, Urine TRACE (*)     Nitrite, Urine POSITIVE (*)     Leukocyte Esterase, Urine SMALL (*)     All other components within normal limits    Narrative:     Performed at:  [address] [place] [address]   Phone [phone]   MICROSCOPIC URINALYSIS - Abnormal; Notable for the following components:    Bacteria, UA 4+ (*)     WBC, UA 26 (*)     All other components within normal limits    Narrative:     Performed at:  [address] [place] [address]   Phone [phone]   CULTURE, URINE   CBC WITH AUTO DIFFERENTIAL    Narrative:     Performed at:  [address] [place]   Phone [phone]   LIPASE    Narrative:     Performed at:  [address] [place] [address] [place]   Phone [phone]   LACTATE, SEPSIS    Narrative:     Performed at:  [address] [place]   Phone [phone]       All other labs were within normal range or not returned as of this dictation. EMERGENCY DEPARTMENT COURSE and DIFFERENTIALDIAGNOSIS/MDM:   Vitals:    Vitals:    12/17/21 1545 12/17/21 1615 12/17/21 1700 12/17/21 1715   BP: 133/69 138/65 131/61 139/63   Pulse:    69   Resp:    16   Temp:       SpO2: 93% 93% 95% 94%   Weight:       Height:           Patient wasnontoxic, well appearing, afebrile with normal vital signs with the exception of hypertension 182/91.    was given fluids, morphine and zofran. Labs are unremarkable including white count and lactic. Urine appears infected with positive nitrite, small leukocytes, 4+ bacteria, 26 whites. Was given Ceftin. CT abdomen pelvis shows epiploic appendagitis. No appendicitis. No diverticulitis. Upon reevaluation, patient appears well. Believe she is appropriate for outpatient management. Will discharge with Ceftin for UTI. Will discharge with ibuprofen for the epiploic appendagitis. Instructed to follow-up with PCP next few days for reevaluation and return for worsening. She agreed understood. PROCEDURES:  None    FINAL IMPRESSION      1. Epiploic appendagitis    2.  Acute cystitis without hematuria        DISPOSITION/PLAN   DISPOSITION Decision To Discharge 12/17/2021 05:13:42 PM      PATIENT REFERRED TO:  Stephen Jara, [address] [place] [address]  [phone]    Schedule an appointment as soon as possible for a visit in 3 days  for reevaluation    [facility]  [address] [place] [address]  [phone]    As needed, If symptoms worsen      DISCHARGE MEDICATIONS:  Discharge Medication List as of 12/17/2021  5:24 PM      START taking these medications    Details   ibuprofen (ADVIL;MOTRIN) 600 MG tablet Take 1 tablet by mouth every 8 hours as needed for Pain, Disp-20 tablet, R-0Normal      cefUROXime (CEFTIN) 250 MG tablet Take 1 tablet by mouth 2 times daily for 10 days, Disp-20 tablet, R-0Normal             (Please note that portions ofthis note were completed with a voice recognition program.  Efforts were made to edit the dictations but occasionally words are mis-transcribed.)    Braden Reese, [address] [place] [address]  12/17/21 [id number]

## 2021-12-17 NOTE — ED NOTES
Patient ambulated to bathroom, independently and without dizziness or other concern prior to discharge.       Kourtney Cotton RN  12/17/21 [id number]

## 2021-12-17 NOTE — ED NOTES
Call Center TCM Note      Responses   [facility] patient discharged from?  [place]   Does the patient have one of the following disease processes/diagnoses(primary or secondary)?  Other   TCM attempt successful?  No   Unsuccessful attempts  Attempt 2          Charmaine Lucas LPN    4/5/2021, 17:09 EDT       Discharge education complete. Patient educated on new medications, follow up care and reasons to seek medical attention. Patient denies questions or concerns, no sxs of distress noted , patient provided wheel chair at time of discharge.         Doretha Herman RN  12/17/21 4585

## 2021-12-19 LAB
ORGANISM: ABNORMAL
URINE CULTURE, ROUTINE: ABNORMAL

## 2022-01-18 ENCOUNTER — OFFICE VISIT (OUTPATIENT)
Dept: VASCULAR SURGERY | Age: 77
End: 2022-01-18
Payer: MEDICARE

## 2022-01-18 VITALS
HEIGHT: 61 IN | BODY MASS INDEX: 41.16 KG/M2 | HEART RATE: 72 BPM | DIASTOLIC BLOOD PRESSURE: 79 MMHG | WEIGHT: 218 LBS | SYSTOLIC BLOOD PRESSURE: 132 MMHG

## 2022-01-18 DIAGNOSIS — I89.0 LYMPHEDEMA OF BOTH LOWER EXTREMITIES: Primary | ICD-10-CM

## 2022-01-18 DIAGNOSIS — I73.9 PERIPHERAL VASCULAR DISEASE (HCC): ICD-10-CM

## 2022-01-18 DIAGNOSIS — Q21.12 PFO (PATENT FORAMEN OVALE): ICD-10-CM

## 2022-01-18 PROCEDURE — 99214 OFFICE O/P EST MOD 30 MIN: CPT | Performed by: SURGERY

## 2022-01-18 PROCEDURE — G8427 DOCREV CUR MEDS BY ELIG CLIN: HCPCS | Performed by: SURGERY

## 2022-01-18 PROCEDURE — G8417 CALC BMI ABV UP PARAM F/U: HCPCS | Performed by: SURGERY

## 2022-01-18 PROCEDURE — G8484 FLU IMMUNIZE NO ADMIN: HCPCS | Performed by: SURGERY

## 2022-01-18 PROCEDURE — G8399 PT W/DXA RESULTS DOCUMENT: HCPCS | Performed by: SURGERY

## 2022-01-18 PROCEDURE — 1090F PRES/ABSN URINE INCON ASSESS: CPT | Performed by: SURGERY

## 2022-01-18 PROCEDURE — 1123F ACP DISCUSS/DSCN MKR DOCD: CPT | Performed by: SURGERY

## 2022-01-18 PROCEDURE — 1036F TOBACCO NON-USER: CPT | Performed by: SURGERY

## 2022-01-18 PROCEDURE — 4040F PNEUMOC VAC/ADMIN/RCVD: CPT | Performed by: SURGERY

## 2022-01-18 NOTE — PROGRESS NOTES
Daily Progress Note   Arnol Ribeiro MD      1/18/2022    Chief Complaint   Patient presents with    6 Month Follow-Up     intermittent cramping in LE still; using compression hose and press (2-4 hours daily)         HISTORY OF PRESENT ILLNESS:                The patient is a 67 y.o. female who presents with an office visit for lymphedema. Mrs. Arely Waterman says she wears her stockings daily, and uses her lymphapress for two hours in the morning, two hours in the evening. She says she can't walk as much as she wishes, but it's because of her left knee. Twice she has been scheduled for surgery, twice it was cancelled. The first time it was because she had a stroke, the second time because of covid. She has not rescheduled. Mrs. Arely Waterman has a \"lump\" on her abdomen that is likely a diastasis, but not a hernia. She had a scan done and it doesn't show at all. Mrs. Arely Waterman does not want her femoral arteries checked. Past Medical History:   Diagnosis Date    Asthma     Diverticulitis     Graves disease     Hyperlipidemia     Hypertension     Hyperthyroidism     Hypothyroidism     Lymphedema of both lower extremities 2/7/2020    Mild persistent asthma without complication     Osteoarthritis     Peptic ulcer, unspecified site, unspecified as acute or chronic, without mention of hemorrhage or perforation     Prolonged emergence from general anesthesia     Sleep apnea     CPAP at night    Venous insufficiency     Wears glasses        Past Surgical History:   Procedure Laterality Date    BLEPHAROPLASTY Left     CHOLECYSTECTOMY  2001    COLONOSCOPY  2011    normal-daniel    FOOT SURGERY Left     spurs removed from left heel    HYSTERECTOMY      KNEE ARTHROSCOPY Left     ROTATOR CUFF REPAIR Right        Social History     Socioeconomic History    Marital status:       Spouse name: Not on file    Number of children: 3    Years of education: Not on file    Highest education level: Not on file   Occupational History    Not on file   Tobacco Use    Smoking status: Former Smoker     Packs/day: 0.25     Years: 3.00     Pack years: 0.75     Types: Cigarettes     Start date: 10/21/1961     Quit date: 10/21/1964     Years since quittin.2    Smokeless tobacco: Never Used   Vaping Use    Vaping Use: Never used   Substance and Sexual Activity    Alcohol use: Yes     Comment: SOCIALLY-wine    Drug use: No    Sexual activity: Never   Other Topics Concern    Not on file   Social History Narrative    Not on file     Social Determinants of Health     Financial Resource Strain: Low Risk     Difficulty of Paying Living Expenses: Not hard at all   Food Insecurity: No Food Insecurity    Worried About [address] in the Last Year: Never true    [facility] in the Last Year: Never true   Transportation Needs:     Lack of Transportation (Medical): Not on file    Lack of Transportation (Non-Medical):  Not on file   Physical Activity:     Days of Exercise per Week: Not on file    Minutes of Exercise per Session: Not on file   Stress:     Feeling of Stress : Not on file   Social Connections:     Frequency of Communication with Friends and Family: Not on file    Frequency of Social Gatherings with Friends and Family: Not on file    Attends Restoration Services: Not on file    Active Member of [address] [place] [address] or Organizations: Not on file    Attends Club or Organization Meetings: Not on file    Marital Status: Not on file   Intimate Partner Violence:     Fear of Current or Ex-Partner: Not on file    Emotionally Abused: Not on file    Physically Abused: Not on file    Sexually Abused: Not on file   Housing Stability:     Unable to Pay for Housing in the Last Year: Not on file    Number of [place] in the Last Year: Not on file    Unstable Housing in the Last Year: Not on file       Family History   Problem Relation Age of Onset    Hypertension Mother     Colon Cancer Father     Diabetes Sister          Current Outpatient Medications:     ALPRAZolam (XANAX) 0.5 MG tablet, Take 0.5 mg by mouth nightly as needed for Sleep., Disp: , Rfl:     dicyclomine (BENTYL) 20 MG tablet, Take 1 tablet by mouth every 6 hours, Disp: 30 tablet, Rfl: 1    amLODIPine (NORVASC) 5 MG tablet, TAKE 1 TABLET BY MOUTH DAILY, Disp: 90 tablet, Rfl: 3    levothyroxine (SYNTHROID) 125 MCG tablet, Take 1 tablet by mouth Daily, Disp: 90 tablet, Rfl: 3    nebivolol (BYSTOLIC) 10 MG tablet, TAKE 1 TABLET DAILY, Disp: 90 tablet, Rfl: 3    hydroCHLOROthiazide (HYDRODIURIL) 25 MG tablet, Take 1 tablet by mouth every morning, Disp: 90 tablet, Rfl: 1    betamethasone valerate (VALISONE) 0.1 % cream, Apply topically 2 times daily, Disp: , Rfl:     ezetimibe (ZETIA) 10 MG tablet, TAKE 1 TABLET DAILY, Disp: 90 tablet, Rfl: 3    loratadine (CLARITIN) 10 MG tablet, Take 10 mg by mouth daily, Disp: , Rfl:     pravastatin (PRAVACHOL) 20 MG tablet, Take 1 tablet by mouth nightly, Disp: 90 tablet, Rfl: 3    Multiple Vitamins-Minerals (THERAPEUTIC MULTIVITAMIN-MINERALS) tablet, Take 1 tablet by mouth daily, Disp: , Rfl:     vitamin C (ASCORBIC ACID) 500 MG tablet, Take 500 mg by mouth daily, Disp: , Rfl:     Cholecalciferol (VITAMIN D3) 125 MCG (5000 UT) TABS, Take 5,000 Units by mouth daily , Disp: , Rfl:     MAGNESIUM PO, Take 400 mg by mouth daily , Disp: , Rfl:     cyanocobalamin ([employer] VITAMIN B12) 1000 MCG tablet, Take 1 tablet by mouth daily, Disp: 30 tablet, Rfl: 3    aspirin 81 MG tablet, Take 81 mg by mouth daily. , Disp: , Rfl:     dicloxacillin (DYNAPEN) 250 MG capsule, Take 250 mg by mouth 4 times daily (Patient not taking: Reported on 1/18/2022), Disp: , Rfl:     ibuprofen (ADVIL;MOTRIN) 600 MG tablet, Take 1 tablet by mouth every 8 hours as needed for Pain, Disp: 20 tablet, Rfl: 0    terconazole (TERAZOL 7) 0.4 % vaginal cream, Place vaginally 2 times daily, Disp: , Rfl:     PROAIR  (90 Base) MCG/ACT mL/min/1.73m2 EGFR, calc. for ages 25 and older using the  MDRD formula (not corrected for weight), is valid for stable  renal function.  GFR  12/17/2021 >60  >60 Final    Comment: Chronic Kidney Disease: less than 60 ml/min/1.73 sq.m. Kidney Failure: less than 15 ml/min/1.73 sq.m. Results valid for patients 18 years and older.       Calcium 12/17/2021 9.5  8.3 - 10.6 mg/dL Final    Total Protein 12/17/2021 7.4  6.4 - 8.2 g/dL Final    Albumin 12/17/2021 4.2  3.4 - 5.0 g/dL Final    Albumin/Globulin Ratio 12/17/2021 1.3  1.1 - 2.2 Final    Total Bilirubin 12/17/2021 0.3  0.0 - 1.0 mg/dL Final    Alkaline Phosphatase 12/17/2021 113  40 - 129 U/L Final    ALT 12/17/2021 17  10 - 40 U/L Final    AST 12/17/2021 13* 15 - 37 U/L Final    Lipase 12/17/2021 34.0  13.0 - 60.0 U/L Final    Magnesium 12/17/2021 1.70* 1.80 - 2.40 mg/dL Final    Color, UA 12/17/2021 YELLOW  Straw/Yellow Final    Clarity, UA 12/17/2021 CLOUDY* Clear Final    Glucose, Ur 12/17/2021 Negative  Negative mg/dL Final    Bilirubin Urine 12/17/2021 Negative  Negative Final    Ketones, Urine 12/17/2021 Negative  Negative mg/dL Final    Specific Gravity, UA 12/17/2021 1.013  1.005 - 1.030 Final    Blood, Urine 12/17/2021 TRACE* Negative Final    pH, UA 12/17/2021 6.0  5.0 - 8.0 Final    Protein, UA 12/17/2021 Negative  Negative mg/dL Final    Urobilinogen, Urine 12/17/2021 0.2  <2.0 E.U./dL Final    Nitrite, Urine 12/17/2021 POSITIVE* Negative Final    Leukocyte Esterase, Urine 12/17/2021 SMALL* Negative Final    Microscopic Examination 12/17/2021 YES   Final    Urine Type 12/17/2021 NotGiven   Final    Urine Reflex to Culture 12/17/2021 Yes   Final    Lactic Acid, Sepsis 12/17/2021 1.0  0.4 - 1.9 mmol/L Final    Bacteria, UA 12/17/2021 4+* None Seen /HPF Final    Hyaline Casts, UA 12/17/2021 0  0 - 8 /LPF Final    WBC, UA 12/17/2021 26* 0 - 5 /HPF Final    RBC, UA 12/17/2021 1  0 - 4 /HPF Final  Epithelial Cells, UA 12/17/2021 0  0 - 5 /HPF Final    Comment: Urinalysis microscopic performed using the  automated methodology (AUWI analyzer).  Organism 12/17/2021 Klebsiella pneumoniae*  Final    Urine Culture, Routine 12/17/2021 >100,000 CFU/ml   Final       Review of Systems   Constitutional: Positive for fatigue. Negative for activity change, appetite change, chills, diaphoresis, fever and unexpected weight change. HENT: Negative. Eyes: Negative. Respiratory: Negative. Cardiovascular: Positive for leg swelling. Negative for chest pain and palpitations. Gastrointestinal: Negative. Endocrine: Negative. Genitourinary: Negative. Musculoskeletal: Negative. Skin: Negative. Allergic/Immunologic: Negative. Neurological: Negative. Hematological: Negative. Psychiatric/Behavioral: Negative. All other systems reviewed and are negative. Physical Exam  Vitals (Obese) and nursing note reviewed. Constitutional:       General: She is not in acute distress. Appearance: Normal appearance. She is well-developed. She is obese. She is not ill-appearing or toxic-appearing. HENT:      Head: Normocephalic and atraumatic. Right Ear: External ear normal.      Mouth/Throat:      Pharynx: No oropharyngeal exudate. Eyes:      General: No scleral icterus. Conjunctiva/sclera: Conjunctivae normal.      Pupils: Pupils are equal, round, and reactive to light. Neck:      Thyroid: No thyromegaly. Vascular: Normal carotid pulses. No carotid bruit or JVD. Trachea: No tracheal deviation. Cardiovascular:      Rate and Rhythm: Normal rate and regular rhythm. Pulses:           Carotid pulses are 2+ on the right side and 2+ on the left side. Radial pulses are 2+ on the right side and 2+ on the left side. Dorsalis pedis pulses are 2+ on the right side and 2+ on the left side.         Posterior tibial pulses are 2+ on the right side and 2+ on the left side. Heart sounds: Normal heart sounds and S1 normal. No murmur heard. Comments:     MEASUREMENTS 2022:     RIGHT ANKLE: 23.6 cm   RIGHT CALF: 46.7 cm     LEFT ANKLE: 24.0 cm   LEFT CALF: 48.7 cm     MEASUREMENTS 21:    RIGHT ANKLE: 23.7 cm  RIGHT CALF: 47.3 cm     LEFT ANKLE: 23.5 cm   LEFT CALF: 48.5 cm     MEASUREMENTS 2021:    RIGHT ANKLE: 23.0 cm   RIGHT CALF: 46.5 cm     LEFT ANKLE: 23.4 cm   LEFT CALF: 47.3 cm     MEASUREMENTS 2020:    RIGHT ANKLE: 22.9 cm  RIGHT CALF: 47.2 cm     LEFT ANKLE: 23.5 cm   LEFT CALF: 48.2 cm     MEASUREMENTS 2020:    RIGHT ANKLE: 22.8 cm  RIGHT CALF: 45.6 cm     LEFT ANKLE:  23.4 cm   LEFT CALF: 45.3 cm       MEASUREMENTS 1/3/2020:    RIGHT ANKLE: 22.8 cm   RIGHT CALF: 46.4 cm     LEFT ANKLE: 23.3 cm   LEFT CALF: 47.7 cm     No abdominal bruits    Pulmonary:      Effort: Pulmonary effort is normal. No tachypnea, accessory muscle usage or respiratory distress. Breath sounds: Normal breath sounds. No stridor. No wheezing or rales. Chest:   Breasts:      Right: No supraclavicular adenopathy. Left: No supraclavicular adenopathy. Abdominal:      General: Bowel sounds are normal. There is no distension. Palpations: Abdomen is soft. There is no mass. Tenderness: There is no abdominal tenderness. There is no guarding or rebound. Hernia: No hernia is present. There is no hernia in the ventral area or left inguinal area. Genitourinary:     Comments: Rectal exam/stool guaiac not indicated. Musculoskeletal:         General: No tenderness. Normal range of motion. Right shoulder: No deformity. Normal range of motion. Cervical back: Normal range of motion and neck supple. No edema or erythema. Right lower le+ Pitting Edema present. Left lower le+ Pitting Edema present.    Lymphadenopathy:      Head:      Right side of head: No submandibular, preauricular, posterior auricular or occipital adenopathy. Left side of head: No submandibular, preauricular, posterior auricular or occipital adenopathy. Cervical: No cervical adenopathy. Right cervical: No superficial, deep or posterior cervical adenopathy. Left cervical: No superficial, deep or posterior cervical adenopathy. Upper Body:      Right upper body: No supraclavicular or pectoral adenopathy. Left upper body: No supraclavicular or pectoral adenopathy. Skin:     General: Skin is warm and dry. Coloration: Skin is not pale. Findings: No bruising, erythema, laceration, lesion or rash. Neurological:      Mental Status: She is alert and oriented to person, place, and time. Cranial Nerves: No cranial nerve deficit. Sensory: No sensory deficit. Motor: No atrophy or abnormal muscle tone. Coordination: Coordination normal.      Gait: Gait normal.      Deep Tendon Reflexes: Reflexes are normal and symmetric. Psychiatric:         Speech: Speech normal.         Behavior: Behavior normal.         Thought Content: Thought content normal.         Judgment: Judgment normal.           ASSESSMENT:    Problem List Items Addressed This Visit        Unprioritized    PFO (patent foramen ovale)    Peripheral vascular disease ([facility] [address]    Lymphedema of both lower extremities - Primary      Patient did not want us to examine her groins    PLAN:  Her lymphedema is stable she is good at wearing her stockings daily we gave her a new prescription for knee-high 3040s that she gets filled at [facility]. She uses her lymphedema pumps 2 hours twice a day every day  Return in six months for lymphedema and leg check. Ayse Kwon MA am scribing for and in the presence of Fer Neri MD on this date of 01/18/22    I Radha Cm MD personally performed the services described in this documentation as scribed by the Medical Assistant Hortencia Lane in my presence and it is both accurate and complete.         Electronically signed by Eze Resendez MD on 1/18/2022 at 11:17 AM

## 2022-02-04 NOTE — TELEPHONE ENCOUNTER
Last OV: 04/09/2021  Next OV: x  Last refill:  Most recent Labs: lipid, cbc, cmp 12/27/2021  Last EKG (if needed):11/23/2021

## 2022-02-07 RX ORDER — PRAVASTATIN SODIUM 20 MG
20 TABLET ORAL NIGHTLY
Qty: 90 TABLET | Refills: 3 | Status: SHIPPED | OUTPATIENT
Start: 2022-02-07

## 2022-02-07 RX ORDER — HYDROCHLOROTHIAZIDE 25 MG/1
25 TABLET ORAL EVERY MORNING
Qty: 90 TABLET | Refills: 3 | Status: SHIPPED | OUTPATIENT
Start: 2022-02-07 | End: 2022-09-15

## 2022-02-07 NOTE — TELEPHONE ENCOUNTER
Medication Refill    Medication needing refilled:  hydroCHLOROthiazide (HYDRODIURIL)    Dosage of the medication:  25 MG tablet     How are you taking this medication (QD, BID, TID, QID, PRN):  Take 1 tablet by mouth every morning    30 or 90 day supply called in:    When will you run out of your medication:    Which Pharmacy are we sending the medication to?:    [address], [address] [place] [id number]   Phone:  [phone]  Fax:  [phone]

## 2022-02-17 ENCOUNTER — HOSPITAL ENCOUNTER (OUTPATIENT)
Dept: WOMENS IMAGING | Age: 77
Discharge: HOME | End: 2022-02-17
Payer: MEDICARE

## 2022-02-17 DIAGNOSIS — Z12.31 VISIT FOR SCREENING MAMMOGRAM: ICD-10-CM

## 2022-02-17 PROCEDURE — 77067 SCR MAMMO BI INCL CAD: CPT

## 2022-03-21 ENCOUNTER — TELEPHONE (OUTPATIENT)
Dept: CARDIOLOGY CLINIC | Age: 77
End: 2022-03-21

## 2022-03-21 NOTE — TELEPHONE ENCOUNTER
Cardiac Risk Assessment message information:     What type of procedure are you having:LEFT KNEE REPLACEMENT     When is your procedure scheduled for:4/19/2022     Who is the physician performing your procedure: DR. Jeanine Mane    Which medications need to be held for your procedure and for how long:IT'S UP TO DR. Elle Laureano         Phone Number:[phone]     Fax number to send the [address] Staff use:     Cardiologist:  Last Appointment:  Next Appointment:  Are you on any blood thinners:  History of Coronary Artery Disease:  Last stress test:  Last echo:  Device Type and :

## 2022-03-22 NOTE — TELEPHONE ENCOUNTER
Spoke with EMMY Miranda about getting patient in before her her procedure to be cleared and she has now been scheduled for 04/07/2022 at 11:45.

## 2022-04-06 NOTE — PROGRESS NOTES
[facility]  Cardiology Progress Note    Ridge Pollack  1945    April 7, 2022      Reason for Referral: acute ischemic stroke, PFO per TTE with bubble study     CC: \"I am doing okay. \"       Subjective:     History of Present Illness:    Ridge Pollack is a 65 y.o. patient with a PMH significant for former smoker, Graves Disease, HTN, HLD, and who is seeing us today for management of recent acute ischemic stroke, PFO revealed per TTE with bubble study. Neurology followed during admission and we discussed possible etiology. No physical limitations. Use of cane. She has hx of panic attack resolved by xanax. States she has been under a lot of family stress. He daughter lives with her. 30 day Event monitor 11/2019 SR with 1 episodes of 20 beat atrial run. Today, she is here for follow up. She is planning to have left knee surgery soon. She states that she has been waiting 3 years for this. Patient denies exertional chest pain/pressure, dyspnea at rest, CLARKE, PND, orthopnea, palpitations, lightheadedness, weight changes, changes in LE edema, and syncope. Patient reports compliance to her medications. Past Medical History:   has a past medical history of Asthma, Diverticulitis, Graves disease, Hyperlipidemia, Hypertension, Hyperthyroidism, Hypothyroidism, Lymphedema of both lower extremities, Mild persistent asthma without complication, Osteoarthritis, Peptic ulcer, unspecified site, unspecified as acute or chronic, without mention of hemorrhage or perforation, Prolonged emergence from general anesthesia, Sleep apnea, Venous insufficiency, and Wears glasses. Surgical History:   has a past surgical history that includes Hysterectomy; Cholecystectomy (2001); Colonoscopy (2011); Rotator cuff repair (Right); Foot surgery (Left); Knee arthroscopy (Left); blepharoplasty (Left); and cyst removal (Left, 12/06/2021). Social History:   reports that she quit smoking about 57 years ago.  Her smoking use included cigarettes. She started smoking about 60 years ago. She has a 0.75 pack-year smoking history. She has never used smokeless tobacco. She reports current alcohol use. She reports that she does not use drugs. Family History:  family history includes Colon Cancer in her father; Diabetes in her sister; Hypertension in her mother. Home Medications:  Were reviewed and are listed in nursing record and/or below  Prior to Admission medications    Medication Sig Start Date End Date Taking? Authorizing Provider   albuterol sulfate  (90 Base) MCG/ACT inhaler INHALE 2 PUFFS INTO THE LUNGS EVERY 6 HOURS AS NEEDED FOR WHEEZING 4/4/22  Yes Celina Lee MD   pravastatin (PRAVACHOL) 20 MG tablet Take 1 tablet by mouth nightly 2/7/22  Yes Marybeth Blount MD   hydroCHLOROthiazide (HYDRODIURIL) 25 MG tablet Take 1 tablet by mouth every morning 2/7/22  Yes Marybeth Blount MD   ALPRAZolam Belvin Blare) 0.5 MG tablet Take 0.5 mg by mouth nightly as needed for Sleep.    Yes Historical Provider, MD   amLODIPine (NORVASC) 5 MG tablet TAKE 1 TABLET BY MOUTH DAILY 9/23/21  Yes Celina Lee MD   levothyroxine (SYNTHROID) 125 MCG tablet Take 1 tablet by mouth Daily 9/8/21  Yes Celina Lee MD   nebivolol (BYSTOLIC) 10 MG tablet TAKE 1 TABLET DAILY 9/8/21  Yes Celina Lee MD   betamethasone valerate (VALISONE) 0.1 % cream Apply topically 2 times daily 8/5/21  Yes Historical Provider, MD   ezetimibe (ZETIA) 10 MG tablet TAKE 1 TABLET DAILY 6/7/21  Yes Celina Lee MD   loratadine (CLARITIN) 10 MG tablet Take 10 mg by mouth daily   Yes Historical Provider, MD   Multiple Vitamins-Minerals (THERAPEUTIC MULTIVITAMIN-MINERALS) tablet Take 1 tablet by mouth daily   Yes Historical Provider, MD   vitamin C (ASCORBIC ACID) 500 MG tablet Take 500 mg by mouth daily   Yes Historical Provider, MD   Cholecalciferol (VITAMIN D3) 125 MCG (5000 UT) TABS Take 5,000 Units by mouth daily    Yes Historical Provider, MD MAGNESIUM PO Take 400 mg by mouth daily    Yes Historical Provider, MD   PROAIR  (62 Base) MCG/ACT inhaler USE 2 INHALATIONS ORALLY   INTO THE LUNGS EVERY 6     HOURS AS NEEDEDFOR        WHEEZING 7/15/19  Yes Cheli Hand MD   cyanocobalamin ([employer] VITAMIN B12) 1000 MCG tablet Take 1 tablet by mouth daily 4/15/16  Yes Cheli Hand MD   aspirin 81 MG tablet Take 81 mg by mouth daily. Yes Historical Provider, MD        Allergies:  Montelukast sodium, Sulfa antibiotics, Valium [diazepam], and Strawberry extract       Review of Systems: all reviewed and refer to HPI   · Constitutional: no unanticipated weight loss. There's been no change in energy level, sleep pattern, or activity level. No fevers, chills. · Eyes: No visual changes or diplopia. No scleral icterus. · ENT: No Headaches, hearing loss or vertigo. No mouth sores or sore throat. · Cardiovascular: No Chest pain, tightness or discomfort.  No Shortness of breath. No Dyspnea on exertion, Orthopnea, Paroxysmal nocturnal dyspnea or breathlessness at rest.   No Palpitations.  No Syncope ('blackouts', 'faints', 'collapse') or dizziness. · Respiratory: No cough or wheezing, no sputum production. No hematemesis. · Gastrointestinal: No abdominal pain, appetite loss, blood in stools. No change in bowel or bladder habits. · Genitourinary: No dysuria, trouble voiding, or hematuria. · Musculoskeletal:  No gait disturbance, no joint complaints. · Integumentary: No rash or pruritis. · Neurological: No headache, diplopia, change in muscle strength, numbness or tingling. · Psychiatric: No anxiety or depression. · Endocrine: No temperature intolerance. No excessive thirst, fluid intake, or urination. No tremor. · Hematologic/Lymphatic: No abnormal bruising or bleeding, blood clots or swollen lymph nodes. · Allergic/Immunologic: No nasal congestion or hives.       Objective:     PHYSICAL EXAM:      Vitals:    04/07/22 1201   BP: 134/74   Pulse: 61   SpO2: 94%   Weight: 219 lb 6.4 oz (99.5 kg)   Height: 5' 1\" (1.549 m)      Weight: 219 lb 6.4 oz (99.5 kg)       General Appearance:  Alert, cooperative, no distress, appears stated age. Head:  Normocephalic, without obvious abnormality, atraumatic. Eyes:  Pupils equal and round. No scleral icterus. Mouth: Moist mucosa, no pharyngeal erythema. Nose: Nares normal. No drainage or sinus tenderness. Neck: Supple, symmetrical, trachea midline. No adenopathy. No tenderness/mass/nodules. No carotid bruit or elevated JVD. Lungs:   Respiratory Effort: Normal   Auscultation: Clear to auscultation bilaterally, respirations unlabored. No wheeze, rales   Chest Wall:  No tenderness or deformity. Cardiovascular:    Pulses  Palpation: normal   Ascultation: Regular rate, S1/ S2 normal. No murmur, rub, or gallop. 2+ radial and pedal pulses, symmetric  Carotid  Femoral   Abdomen and Gastrointestinal:   Soft, non-tender, bowel sounds active. Liver and Spleen  Masses   Musculoskeletal: No muscle wasting  Back  Gait   Extremities: Extremities normal, atraumatic. No cyanosis or edema. No cyanosis clubbing       Skin: Inspection and palpation performed, no rashes or lesions. Pysch: Normal mood and affect.  Alert and oriented to time place person   Neurologic: Normal gross motor and sensory exam.       Labs     All labs have been reviewed    Lab Results   Component Value Date    WBC 5.2 03/18/2022    RBC 4.50 03/18/2022    HGB 12.9 03/18/2022    HCT 40.4 03/18/2022    MCV 89.8 03/18/2022    RDW 14.2 03/18/2022     03/18/2022     Lab Results   Component Value Date     03/18/2022    K 4.1 03/18/2022    K 3.5 12/17/2021     03/18/2022    CO2 26 03/18/2022    BUN 18 03/18/2022    CREATININE 0.6 03/18/2022    GFRAA >60 03/18/2022    GFRAA >60 02/28/2013    AGRATIO 2.1 03/18/2022    LABGLOM >60 03/18/2022    LABGLOM 62.5 07/05/2011    GLUCOSE 97 03/18/2022    GLUCOSE 98 07/05/2011    PROT 6.2 2022    PROT 6.7 2013    CALCIUM 9.6 2022    BILITOT <0.2 2022    ALKPHOS 108 2022    AST 12 2022    ALT 14 2022     No results found for: codetag [facility]  Lab Results   Component Value Date    LABA1C 5.5 2022     Lab Results   Component Value Date    TROPONINI <0.01 2021       Cardiac, Vascular and Imaging Data all Personally Reviewed in Detail by Myself      EK2020 Sinus bradycardia    Echocardiogram: 10/17/19  Summary  Suboptimal image quality. Normal left ventricle size, wall thickness, and systolic function with an  estimated ejection fraction of 55-60%. No regional wall motion abnormalities are seen. Normal right ventricular size and function. The left atrium is mildly dilated. Mild aortic and tricuspid regurgitation. Trivial mitral regurgitation. A bubble study was performed and shows evidence of right to left shunting  consistent with a patent foramen ovale    Stress Test: none     Cath: none     Other imaging:     MRI brain: 10/17/19  Punctate acute infarct in the hiram.  No intracranial hemorrhage or mass   effect. CT head wo contrast:10/17/19  No acute intracranial abnormality    CTA head and neck:10/17/19  Unremarkable CTA of the head and neck    JUSTINE 2020  Normal LV function  RV Function is normal  Mild MR  mild-moderate AI  A bubble study was performed and fails to show evidence of right to left  shunting due to the increased pressure of the left atrium     Assessment and Plan     Preoperative risk assessment  Patient planning to have left total knee arthroplasty on 2022. Will need repeat echocardiogram.     Ischemic stroke infarcts noted in the right posterior hiram region  No signs/symptoms of stroke. Continue Asa and statin therapy. Patent foramen ovale   Repeat echocardiogram. Continue Asa and statin therapy. Hypertension, essential   Controlled. Continue current medical management.      Hyperlipemia, mixed   Continue Pravachol and Zetia. Graves Disease  Managed by PCP. Follow up in 7-8 months. Thank you for allowing us to participate in the care of Chica Santos. Please do not hesitate to contact me if you have any questions. Derik Duran MD, MPH    [place] [address] [address], [place] [facility]  Ph: [phone]  Fax: [phone]  This note was scribed in the presence of Dr Irais Marie, by Carly White RN    Physician Attestation:  The scribes documentation has been prepared under my direction and personally reviewed by me in its entirety. I confirm that the note above accurately reflects all work, treatment, procedures, and medical decision making performed by me.

## 2022-04-07 ENCOUNTER — OFFICE VISIT (OUTPATIENT)
Dept: CARDIOLOGY CLINIC | Age: 77
End: 2022-04-07
Payer: MEDICARE

## 2022-04-07 VITALS
DIASTOLIC BLOOD PRESSURE: 74 MMHG | BODY MASS INDEX: 41.42 KG/M2 | WEIGHT: 219.4 LBS | HEIGHT: 61 IN | HEART RATE: 61 BPM | OXYGEN SATURATION: 94 % | SYSTOLIC BLOOD PRESSURE: 134 MMHG

## 2022-04-07 DIAGNOSIS — Q21.12 PFO (PATENT FORAMEN OVALE): ICD-10-CM

## 2022-04-07 DIAGNOSIS — I10 ESSENTIAL HYPERTENSION: Primary | ICD-10-CM

## 2022-04-07 DIAGNOSIS — Z01.818 PREOPERATIVE CLEARANCE: ICD-10-CM

## 2022-04-07 DIAGNOSIS — E78.2 MIXED HYPERLIPIDEMIA: ICD-10-CM

## 2022-04-07 PROCEDURE — 93000 ELECTROCARDIOGRAM COMPLETE: CPT | Performed by: INTERNAL MEDICINE

## 2022-04-07 PROCEDURE — G8427 DOCREV CUR MEDS BY ELIG CLIN: HCPCS | Performed by: INTERNAL MEDICINE

## 2022-04-07 PROCEDURE — G8417 CALC BMI ABV UP PARAM F/U: HCPCS | Performed by: INTERNAL MEDICINE

## 2022-04-07 PROCEDURE — 4040F PNEUMOC VAC/ADMIN/RCVD: CPT | Performed by: INTERNAL MEDICINE

## 2022-04-07 PROCEDURE — 1036F TOBACCO NON-USER: CPT | Performed by: INTERNAL MEDICINE

## 2022-04-07 PROCEDURE — 1090F PRES/ABSN URINE INCON ASSESS: CPT | Performed by: INTERNAL MEDICINE

## 2022-04-07 PROCEDURE — 1123F ACP DISCUSS/DSCN MKR DOCD: CPT | Performed by: INTERNAL MEDICINE

## 2022-04-07 PROCEDURE — G8399 PT W/DXA RESULTS DOCUMENT: HCPCS | Performed by: INTERNAL MEDICINE

## 2022-04-07 PROCEDURE — 99214 OFFICE O/P EST MOD 30 MIN: CPT | Performed by: INTERNAL MEDICINE

## 2022-04-11 ENCOUNTER — HOSPITAL ENCOUNTER (OUTPATIENT)
Dept: NON INVASIVE DIAGNOSTICS | Age: 77
Discharge: HOME | End: 2022-04-11
Payer: MEDICARE

## 2022-04-11 DIAGNOSIS — I10 ESSENTIAL HYPERTENSION: ICD-10-CM

## 2022-04-11 DIAGNOSIS — Q21.12 PFO (PATENT FORAMEN OVALE): ICD-10-CM

## 2022-04-11 LAB
LV EF: 55 %
LVEF MODALITY: NORMAL

## 2022-04-11 PROCEDURE — 93306 TTE W/DOPPLER COMPLETE: CPT

## 2022-04-13 ENCOUNTER — TELEPHONE (OUTPATIENT)
Dept: CARDIOLOGY CLINIC | Age: 77
End: 2022-04-13

## 2022-04-13 NOTE — TELEPHONE ENCOUNTER
Dr. Rodri Galindo    Patient seen in the office 4/7/22 with hx of ischemic stroke, PFO, HTN and HLD. Per your office note, patient needs to complete echo prior to cardiac clearance for knee replacement. She completed echo on 4/11/22. Please review and advise regarding clearance.

## 2022-04-13 NOTE — TELEPHONE ENCOUNTER
Called patient to make aware. Patient said that her PCP  [facility] already told her to hold her aspirin for seven days so that is what she is doing.

## 2022-04-13 NOTE — TELEPHONE ENCOUNTER
Valeri Ayon from [facility] day surgery called in this morning wanting to know if Dr. Veto Boswell is going to clear [place] for her total knee replacement? [place] stated she see's the ECHO, but no notes from Dr. Veto Boswell clearing her.        You can reach Jasmin at #[phone]  Fax number [phone]

## 2022-04-13 NOTE — TELEPHONE ENCOUNTER
Please see result note, Dr. Hai Blunt reviewed echocardiogram results and patient may proceed with her surgery. Left message for Oswaldo Blase to return call. When patient returns call please notify of echo results and may proceed with her surgery. Please prepare a letter if necessary and notify their office.

## 2022-04-13 NOTE — TELEPHONE ENCOUNTER
Attempted to reach [facility] at [address] [facility]. Left message from [facility], [facility]. below. Requested call back.

## 2022-04-13 NOTE — TELEPHONE ENCOUNTER
According to Echo results pt is cleared for knee surgery. [facility] is requesting how long should pt should hold ASA before surgery?

## 2022-04-13 NOTE — TELEPHONE ENCOUNTER
From a cardiac standpoint we do not require her to stop/HOLD her aspirin therapy. If the surgeon performing the LTKR requests it to be stopped, then this can be addressed with the cardiologist. Typically aspirin is held 5-7 days prior to surgery if required/requested. Please check to see what their preference is?  Thanks

## 2022-04-13 NOTE — TELEPHONE ENCOUNTER
In previous encounter patient patient said that her PCP Dr. Laura Anaya told her to hold her aspirin for seven days. She said that [facility] called her also and she told them that is what she is going to do.

## 2022-07-12 PROBLEM — D69.6 THROMBOCYTOPENIA, UNSPECIFIED (HCC): Status: RESOLVED | Noted: 2021-08-26 | Resolved: 2022-07-12

## 2022-07-19 ENCOUNTER — OFFICE VISIT (OUTPATIENT)
Dept: VASCULAR SURGERY | Age: 77
End: 2022-07-19
Payer: MEDICARE

## 2022-07-19 VITALS — BODY MASS INDEX: 41.53 KG/M2 | HEIGHT: 61 IN

## 2022-07-19 DIAGNOSIS — I89.0 LYMPHEDEMA OF BOTH LOWER EXTREMITIES: Primary | ICD-10-CM

## 2022-07-19 PROCEDURE — 1123F ACP DISCUSS/DSCN MKR DOCD: CPT | Performed by: SURGERY

## 2022-07-19 PROCEDURE — 99214 OFFICE O/P EST MOD 30 MIN: CPT | Performed by: SURGERY

## 2022-07-19 ASSESSMENT — ENCOUNTER SYMPTOMS
RESPIRATORY NEGATIVE: 1
GASTROINTESTINAL NEGATIVE: 1
ALLERGIC/IMMUNOLOGIC NEGATIVE: 1
EYES NEGATIVE: 1

## 2022-07-19 NOTE — PATIENT INSTRUCTIONS
You are being given a prescription for 30-40 mmHg stockings. Schedule for a six month follow up for lymphedema and swelling.

## 2022-07-19 NOTE — PROGRESS NOTES
Daily Progress Note   Hannah Kennedy MD      2022    Chief Complaint   Patient presents with    6 Month Follow-Up         HISTORY OF PRESENT ILLNESS:                The patient is a 65 y.o. female who presents with follow-up visit for bilateral lymphedema  Patient has had a knee replacement on the left about 3 months ago and is done very well    Past Medical History:   Diagnosis Date    Asthma     Diverticulitis     Graves disease     Hyperlipidemia     Hypertension     Hyperthyroidism     Hypothyroidism     Lymphedema of both lower extremities 2020    Mild persistent asthma without complication     Osteoarthritis     Peptic ulcer, unspecified site, unspecified as acute or chronic, without mention of hemorrhage or perforation     Prolonged emergence from general anesthesia     Sleep apnea     CPAP at night    Thrombocytopenia, unspecified 2021    Venous insufficiency     Wears glasses        Past Surgical History:   Procedure Laterality Date    BLEPHAROPLASTY Left     CHOLECYSTECTOMY      COLONOSCOPY  2011    normal-daniel    CYST REMOVAL Left 2021    eye    FOOT SURGERY Left     spurs removed from left heel    HYSTERECTOMY (CERVIX STATUS UNKNOWN)      KNEE ARTHROSCOPY Left     ROTATOR CUFF REPAIR Right        Social History     Socioeconomic History    Marital status:       Spouse name: Not on file    Number of children: 3    Years of education: Not on file    Highest education level: Not on file   Occupational History    Not on file   Tobacco Use    Smoking status: Former     Packs/day: 0.25     Years: 3.00     Pack years: 0.75     Types: Cigarettes     Start date: 10/21/1961     Quit date: 10/21/1964     Years since quittin.7    Smokeless tobacco: Never   Vaping Use    Vaping Use: Never used   Substance and Sexual Activity    Alcohol use: Yes     Comment: SOCIALLY-wine    Drug use: No    Sexual activity: Never   Other Topics Concern    Not on file   Social History Narrative Not on file     Social Determinants of Health     Financial Resource Strain: Not on file   Food Insecurity: Not on file   Transportation Needs: Not on file   Physical Activity: Insufficiently Active    Days of Exercise per Week: 2 days    Minutes of Exercise per Session: 30 min   Stress: Not on file   Social Connections: Not on file   Intimate Partner Violence: Not on file   Housing Stability: Not on file       Family History   Problem Relation Age of Onset    Hypertension Mother     Colon Cancer Father     Diabetes Sister          Current Outpatient Medications:     magnesium (MAGNESIUM-OXIDE) 250 MG TABS tablet, Take 750 mg by mouth daily, Disp: , Rfl:     ezetimibe (ZETIA) 10 MG tablet, TAKE 1 TABLET DAILY, Disp: 90 tablet, Rfl: 3    albuterol sulfate  (90 Base) MCG/ACT inhaler, INHALE 2 PUFFS INTO THE LUNGS EVERY 6 HOURS AS NEEDED FOR WHEEZING, Disp: 54 g, Rfl: 5    pravastatin (PRAVACHOL) 20 MG tablet, Take 1 tablet by mouth nightly, Disp: 90 tablet, Rfl: 3    hydroCHLOROthiazide (HYDRODIURIL) 25 MG tablet, Take 1 tablet by mouth every morning, Disp: 90 tablet, Rfl: 3    ALPRAZolam (XANAX) 0.5 MG tablet, Take 0.5 mg by mouth nightly as needed for Sleep., Disp: , Rfl:     amLODIPine (NORVASC) 5 MG tablet, TAKE 1 TABLET BY MOUTH DAILY, Disp: 90 tablet, Rfl: 3    levothyroxine (SYNTHROID) 125 MCG tablet, Take 1 tablet by mouth Daily, Disp: 90 tablet, Rfl: 3    nebivolol (BYSTOLIC) 10 MG tablet, TAKE 1 TABLET DAILY, Disp: 90 tablet, Rfl: 3    loratadine (CLARITIN) 10 MG tablet, Take 10 mg by mouth daily, Disp: , Rfl:     Multiple Vitamins-Minerals (THERAPEUTIC MULTIVITAMIN-MINERALS) tablet, Take 1 tablet by mouth daily, Disp: , Rfl:     vitamin C (ASCORBIC ACID) 500 MG tablet, Take 500 mg by mouth daily, Disp: , Rfl:     Cholecalciferol (VITAMIN D3) 125 MCG (5000 UT) TABS, Take 5,000 Units by mouth daily , Disp: , Rfl:     cyanocobalamin ([employer] VITAMIN B12) 1000 MCG tablet, Take 1 tablet by mouth daily, Disp: 30 tablet, Rfl: 3    aspirin 81 MG tablet, Take 81 mg by mouth daily. , Disp: , Rfl:     betamethasone valerate (VALISONE) 0.1 % cream, Apply topically 2 times daily, Disp: , Rfl:     PROAIR  (90 Base) MCG/ACT inhaler, USE 2 INHALATIONS ORALLY   INTO THE LUNGS EVERY 6     HOURS AS NEEDEDFOR        WHEEZING, Disp: 8.5 g, Rfl: 4    Montelukast sodium, Sulfa antibiotics, Valium [diazepam], and Strawberry extract    Vitals:    07/19/22 0938   Height: 5' 1\" (1.549 m)       Orders Only on 07/08/2022   Component Date Value Ref Range Status    TSH 07/08/2022 1.98  0.27 - 4.20 uIU/mL Final    Hemoglobin A1C 07/08/2022 5.4  See comment % Final    Comment: Comment:  Diagnosis of Diabetes: > or = 6.5%  Increased risk of diabetes (Prediabetes): 5.7-6.4%  Glycemic Control: Nonpregnant Adults: <7.0%                    Pregnant: <6.0%        eAG 07/08/2022 108.3  mg/dL Final    Cholesterol, Total 07/08/2022 149  0 - 199 mg/dL Final    Triglycerides 07/08/2022 118  0 - 150 mg/dL Final    HDL 07/08/2022 55  40 - 60 mg/dL Final    LDL Calculated 07/08/2022 70  <100 mg/dL Final    VLDL Cholesterol Calculated 07/08/2022 24  Not Established mg/dL Final    Magnesium 07/08/2022 1.70 (A) 1.80 - 2.40 mg/dL Final    Sodium 07/08/2022 145  136 - 145 mmol/L Final    Potassium 07/08/2022 3.9  3.5 - 5.1 mmol/L Final    Chloride 07/08/2022 105  99 - 110 mmol/L Final    CO2 07/08/2022 28  21 - 32 mmol/L Final    Anion Gap 07/08/2022 12  3 - 16 Final    Glucose 07/08/2022 97  70 - 99 mg/dL Final    BUN 07/08/2022 17  7 - 20 mg/dL Final    CREATININE 07/08/2022 0.8  0.6 - 1.2 mg/dL Final    GFR Non- 07/08/2022 >60  >60 Final    Comment: >60 mL/min/1.73m2 EGFR, calc. for ages 25 and older using the  MDRD formula (not corrected for weight), is valid for stable  renal function. GFR  07/08/2022 >60  >60 Final    Comment: Chronic Kidney Disease: less than 60 ml/min/1.73 sq.m.           Kidney Failure: less than 15 ml/min/1.73 sq.m. Results valid for patients 18 years and older. Calcium 07/08/2022 9.8  8.3 - 10.6 mg/dL Final    Total Protein 07/08/2022 6.7  6.4 - 8.2 g/dL Final    Albumin 07/08/2022 4.2  3.4 - 5.0 g/dL Final    Albumin/Globulin Ratio 07/08/2022 1.7  1.1 - 2.2 Final    Total Bilirubin 07/08/2022 0.3  0.0 - 1.0 mg/dL Final    Alkaline Phosphatase 07/08/2022 135 (A) 40 - 129 U/L Final    ALT 07/08/2022 13  10 - 40 U/L Final    AST 07/08/2022 12 (A) 15 - 37 U/L Final       Review of Systems   Constitutional:  Positive for fatigue. Negative for activity change, appetite change, chills, diaphoresis, fever and unexpected weight change. HENT: Negative. Eyes: Negative. Respiratory: Negative. Cardiovascular:  Positive for leg swelling. Negative for chest pain and palpitations. Gastrointestinal: Negative. Endocrine: Negative. Genitourinary: Negative. Musculoskeletal: Negative. Skin: Negative. Allergic/Immunologic: Negative. Neurological: Negative. Hematological: Negative. Psychiatric/Behavioral: Negative. All other systems reviewed and are negative. Physical Exam  Vitals (Obese) and nursing note reviewed. Constitutional:       General: She is not in acute distress. Appearance: Normal appearance. She is well-developed. She is obese. She is not ill-appearing or toxic-appearing. HENT:      Head: Normocephalic and atraumatic. Right Ear: External ear normal.      Mouth/Throat:      Pharynx: No oropharyngeal exudate. Eyes:      General: No scleral icterus. Conjunctiva/sclera: Conjunctivae normal.      Pupils: Pupils are equal, round, and reactive to light. Neck:      Thyroid: No thyromegaly. Vascular: Normal carotid pulses. No carotid bruit or JVD. Trachea: No tracheal deviation. Cardiovascular:      Rate and Rhythm: Normal rate and regular rhythm. Pulses:           Carotid pulses are 2+ on the right side and 2+ on the left side. indicated. Musculoskeletal:         General: No tenderness. Normal range of motion. Right shoulder: No deformity. Normal range of motion. Cervical back: Normal range of motion and neck supple. No edema or erythema. Right lower le+ Pitting Edema present. Left lower le+ Pitting Edema present. Legs:    Lymphadenopathy:      Head:      Right side of head: No submandibular, preauricular, posterior auricular or occipital adenopathy. Left side of head: No submandibular, preauricular, posterior auricular or occipital adenopathy. Cervical: No cervical adenopathy. Right cervical: No superficial, deep or posterior cervical adenopathy. Left cervical: No superficial, deep or posterior cervical adenopathy. Upper Body:      Right upper body: No supraclavicular or pectoral adenopathy. Left upper body: No supraclavicular or pectoral adenopathy. Skin:     General: Skin is warm and dry. Coloration: Skin is not pale. Findings: No bruising, erythema, laceration, lesion or rash. Neurological:      Mental Status: She is alert and oriented to person, place, and time. Cranial Nerves: No cranial nerve deficit. Sensory: No sensory deficit. Motor: No atrophy or abnormal muscle tone. Coordination: Coordination normal.      Gait: Gait normal.      Deep Tendon Reflexes: Reflexes are normal and symmetric. Psychiatric:         Speech: Speech normal.         Behavior: Behavior normal.         Thought Content:  Thought content normal.         Judgment: Judgment normal.   Ms.. Rocio Mendoza had a left total knee replacement done ,       ASSESSMENT:    Problem List Items Addressed This Visit          Unprioritized    Lymphedema of both lower extremities - Primary    BMI 40.0-44.9, adult ([facility] [address]       PLAN:  She has joined a gym I encouraged her to use the warm water exercises that would both help her lymphedema because of the weight of the water support and believe in the arthritic problems in her knees hips and ankles  You are being given a prescription for 30-40 mmHg stockings. Schedule for a six month follow up for lymphedema and swelling. Jackson South MA am scribing for and in the presence of Farzad Conrad MD on this date of 07/19/22    I Nohemy Caruso MD personally performed the services described in this documentation as scribed by the Medical Assistant Arias Lane in my presence and it is both accurate and complete.       Electronically signed by Farzad Conrad MD on 7/19/2022 at 11:21 AM

## 2022-08-30 ENCOUNTER — OFFICE VISIT (OUTPATIENT)
Dept: PULMONOLOGY | Age: 77
End: 2022-08-30
Payer: MEDICARE

## 2022-08-30 VITALS
HEIGHT: 61 IN | HEART RATE: 62 BPM | BODY MASS INDEX: 41.42 KG/M2 | TEMPERATURE: 97.8 F | RESPIRATION RATE: 21 BRPM | DIASTOLIC BLOOD PRESSURE: 70 MMHG | WEIGHT: 219.4 LBS | OXYGEN SATURATION: 98 % | SYSTOLIC BLOOD PRESSURE: 130 MMHG

## 2022-08-30 DIAGNOSIS — G47.33 OSA (OBSTRUCTIVE SLEEP APNEA): ICD-10-CM

## 2022-08-30 DIAGNOSIS — J30.89 OTHER ALLERGIC RHINITIS: ICD-10-CM

## 2022-08-30 DIAGNOSIS — J45.30 MILD PERSISTENT ASTHMA WITHOUT COMPLICATION: ICD-10-CM

## 2022-08-30 PROCEDURE — 1090F PRES/ABSN URINE INCON ASSESS: CPT | Performed by: INTERNAL MEDICINE

## 2022-08-30 PROCEDURE — 1123F ACP DISCUSS/DSCN MKR DOCD: CPT | Performed by: INTERNAL MEDICINE

## 2022-08-30 PROCEDURE — 1036F TOBACCO NON-USER: CPT | Performed by: INTERNAL MEDICINE

## 2022-08-30 PROCEDURE — 99214 OFFICE O/P EST MOD 30 MIN: CPT | Performed by: INTERNAL MEDICINE

## 2022-08-30 PROCEDURE — G8427 DOCREV CUR MEDS BY ELIG CLIN: HCPCS | Performed by: INTERNAL MEDICINE

## 2022-08-30 PROCEDURE — G8399 PT W/DXA RESULTS DOCUMENT: HCPCS | Performed by: INTERNAL MEDICINE

## 2022-08-30 PROCEDURE — G8417 CALC BMI ABV UP PARAM F/U: HCPCS | Performed by: INTERNAL MEDICINE

## 2022-08-30 ASSESSMENT — SLEEP AND FATIGUE QUESTIONNAIRES
HOW LIKELY ARE YOU TO NOD OFF OR FALL ASLEEP WHILE SITTING AND TALKING TO SOMEONE: 0
HOW LIKELY ARE YOU TO NOD OFF OR FALL ASLEEP WHILE SITTING AND READING: 1
HOW LIKELY ARE YOU TO NOD OFF OR FALL ASLEEP WHILE LYING DOWN TO REST IN THE AFTERNOON WHEN CIRCUMSTANCES PERMIT: 0
HOW LIKELY ARE YOU TO NOD OFF OR FALL ASLEEP WHEN YOU ARE A PASSENGER IN A CAR FOR AN HOUR WITHOUT A BREAK: 0
ESS TOTAL SCORE: 1
HOW LIKELY ARE YOU TO NOD OFF OR FALL ASLEEP IN A CAR, WHILE STOPPED FOR A FEW MINUTES IN TRAFFIC: 0
HOW LIKELY ARE YOU TO NOD OFF OR FALL ASLEEP WHILE SITTING INACTIVE IN A PUBLIC PLACE: 0
HOW LIKELY ARE YOU TO NOD OFF OR FALL ASLEEP WHILE WATCHING TV: 0
HOW LIKELY ARE YOU TO NOD OFF OR FALL ASLEEP WHILE SITTING QUIETLY AFTER LUNCH WITHOUT ALCOHOL: 0

## 2022-08-30 ASSESSMENT — ASTHMA QUESTIONNAIRES
ACT_TOTALSCORE: 23
QUESTION_3 LAST FOUR WEEKS HOW OFTEN DID YOUR ASTHMA SYMPTOMS (WHEEZING, COUGHING, SHORTNESS OF BREATH, CHEST TIGHTNESS OR PAIN) WAKE YOU UP AT NIGHT OR EARLIER THAN USUAL IN THE MORNING: 5
QUESTION_1 LAST FOUR WEEKS HOW MUCH OF THE TIME DID YOUR ASTHMA KEEP YOU FROM GETTING AS MUCH DONE AT WORK, SCHOOL OR AT HOME: 5
QUESTION_4 LAST FOUR WEEKS HOW OFTEN HAVE YOU USED YOUR RESCUE INHALER OR NEBULIZER MEDICATION (SUCH AS ALBUTEROL): 4
QUESTION_5 LAST FOUR WEEKS HOW WOULD YOU RATE YOUR ASTHMA CONTROL: 5
QUESTION_2 LAST FOUR WEEKS HOW OFTEN HAVE YOU HAD SHORTNESS OF BREATH: 4

## 2022-08-30 NOTE — PROGRESS NOTES
REASON FOR CONSULTATION/CC:    Chief Complaint   Patient presents with    Follow-up    Asthma     CPAP        Consult at request of   Nain Pearson MD    PCP: Nain Pearson MD    HISTORY OF PRESENT ILLNESS: Rian Mann is a 65y.o. year old female with a history of asthma who presents                Nasima  Occasionally having leak. Using nightly       Asthma / allergic rhinitis  Asthma well controlled  Rare use of albuterol . Claritin . Recently admitted for pancreatitis               ASTHMA CONTROL TEST 8/30/2022 8/30/2021 8/10/2020 9/24/2019 4/16/2019 2/11/2019 8/15/2018   In the past 4 weeks, how much of the time did your asthma keep you from getting as much done at work, school or at home? 5 4 4 4 4 3 3   During the past 4 weeks, how often have you had shortness of breath? 4 4 4 4 4 4 3   During the past 4 weeks, how often did your asthma symptoms (wheezing, coughing, shortness of breath, chest tightness or pain) wake you up at night or earlier than usual in the morning? 5 4 4 5 5 4 2   During the past 4 weeks, how often have you used your rescue inhaler or nebulizer medication (such as albuterol)? 4 3 4 4 5 4 3   How would you rate your asthma control during the past 4 weeks? 5 4 4 5 5 4 3   Asthma Control Test Total Score 23 19 20 22 23 19 14         Objective:   PHYSICAL EXAM:  Blood pressure 130/70, pulse 62, temperature 97.8 °F (36.6 °C), resp. rate 21, height 5' 1\" (1.549 m), weight 219 lb 6.4 oz (99.5 kg), SpO2 98 %, not currently breastfeeding.'    Gen: No distress. Body mass index is 41.46 kg/m². ENT:   Resp: No accessory muscle use. No crackles. No wheezes. No rhonchi. CV: Regular rate. Regular rhythm. No murmur or rub. No edema. Skin: Warm, dry, normal texture and turgor. No nodule on exposed extremities. M/S: No cyanosis. No clubbing. No joint deformity. Psych: Oriented x 3. No anxiety. Awake. Alert. Intact judgement and insight. Good Mood / Affect.   Memory appears in tact        Data Reviewed:        Assessment:     allergic rhinitis   Asthma    Plan:        Problem List Items Addressed This Visit       Other allergic rhinitis      Controlled with Claritin. LYNNE (obstructive sleep apnea)      Titration is not required during this visit. PAP download information:  Usage > 4 hours  100 %   Pressure setting  14.6 cwp    AHI with usage  2.0     See media tab for full download data. Winifred Cabot  is deriving benefit from PAP demonstrated by improved [place], AHI, symptoms. Mild persistent asthma without complication      Controlled.   Very rare use of albuterol

## 2022-08-30 NOTE — ASSESSMENT & PLAN NOTE
Titration is not required during this visit. PAP download information:  Usage > 4 hours  100 %   Pressure setting  14.6 cwp    AHI with usage  2.0     See media tab for full download data. Edmar Cobb  is deriving benefit from PAP demonstrated by improved [place], AHI, symptoms.

## 2022-09-12 ENCOUNTER — HOSPITAL ENCOUNTER (OUTPATIENT)
Dept: CT IMAGING | Age: 77
Discharge: HOME | End: 2022-09-12
Payer: MEDICARE

## 2022-09-12 ENCOUNTER — HOSPITAL ENCOUNTER (OUTPATIENT)
Age: 77
Discharge: HOME | End: 2022-09-12
Payer: MEDICARE

## 2022-09-12 DIAGNOSIS — R10.13 EPIGASTRIC PAIN: ICD-10-CM

## 2022-09-12 DIAGNOSIS — K92.1 MELENA: ICD-10-CM

## 2022-09-12 DIAGNOSIS — R10.11 RUQ PAIN: ICD-10-CM

## 2022-09-12 DIAGNOSIS — D69.6 THROMBOCYTOPENIA, UNSPECIFIED (HCC): ICD-10-CM

## 2022-09-12 DIAGNOSIS — I10 ESSENTIAL HYPERTENSION, BENIGN: ICD-10-CM

## 2022-09-12 DIAGNOSIS — E78.1 PURE HYPERTRIGLYCERIDEMIA: ICD-10-CM

## 2022-09-12 LAB
A/G RATIO: 1.7 (ref 1.1–2.2)
ALBUMIN SERPL-MCNC: 4.3 G/DL (ref 3.4–5)
ALP BLD-CCNC: 123 U/L (ref 40–129)
ALT SERPL-CCNC: 23 U/L (ref 10–40)
AMYLASE: 38 U/L (ref 25–115)
ANION GAP SERPL CALCULATED.3IONS-SCNC: 15 MMOL/L (ref 3–16)
AST SERPL-CCNC: 22 U/L (ref 15–37)
BASOPHILS ABSOLUTE: 0 K/UL (ref 0–0.2)
BASOPHILS RELATIVE PERCENT: 0.3 %
BILIRUB SERPL-MCNC: 0.5 MG/DL (ref 0–1)
BILIRUBIN DIRECT: <0.2 MG/DL (ref 0–0.3)
BILIRUBIN, INDIRECT: NORMAL MG/DL (ref 0–1)
BUN BLDV-MCNC: 12 MG/DL (ref 7–20)
CALCIUM SERPL-MCNC: 9.8 MG/DL (ref 8.3–10.6)
CHLORIDE BLD-SCNC: 87 MMOL/L (ref 99–110)
CO2: 25 MMOL/L (ref 21–32)
CREAT SERPL-MCNC: 0.6 MG/DL (ref 0.6–1.2)
EOSINOPHILS ABSOLUTE: 0 K/UL (ref 0–0.6)
EOSINOPHILS RELATIVE PERCENT: 0.2 %
GFR AFRICAN AMERICAN: >60
GFR AFRICAN AMERICAN: >60
GFR NON-AFRICAN AMERICAN: >60
GFR NON-AFRICAN AMERICAN: >60
GLUCOSE BLD-MCNC: 107 MG/DL (ref 70–99)
HCT VFR BLD CALC: 38.3 % (ref 36–48)
HEMOGLOBIN: 12.8 G/DL (ref 12–16)
LIPASE: 29 U/L (ref 13–60)
LYMPHOCYTES ABSOLUTE: 1.2 K/UL (ref 1–5.1)
LYMPHOCYTES RELATIVE PERCENT: 16.2 %
MAGNESIUM: 1.4 MG/DL (ref 1.8–2.4)
MCH RBC QN AUTO: 27.3 PG (ref 26–34)
MCHC RBC AUTO-ENTMCNC: 33.5 G/DL (ref 31–36)
MCV RBC AUTO: 81.6 FL (ref 80–100)
MONOCYTES ABSOLUTE: 0.5 K/UL (ref 0–1.3)
MONOCYTES RELATIVE PERCENT: 7 %
NEUTROPHILS ABSOLUTE: 5.6 K/UL (ref 1.7–7.7)
NEUTROPHILS RELATIVE PERCENT: 76.3 %
PDW BLD-RTO: 14.2 % (ref 12.4–15.4)
PERFORMED ON: NORMAL
PLATELET # BLD: 190 K/UL (ref 135–450)
PMV BLD AUTO: 7.3 FL (ref 5–10.5)
POC CREATININE: 0.6 MG/DL (ref 0.6–1.2)
POC SAMPLE TYPE: NORMAL
POTASSIUM SERPL-SCNC: 3.5 MMOL/L (ref 3.5–5.1)
RBC # BLD: 4.7 M/UL (ref 4–5.2)
SODIUM BLD-SCNC: 127 MMOL/L (ref 136–145)
TOTAL PROTEIN: 6.9 G/DL (ref 6.4–8.2)
WBC # BLD: 7.3 K/UL (ref 4–11)

## 2022-09-12 PROCEDURE — 82150 ASSAY OF AMYLASE: CPT

## 2022-09-12 PROCEDURE — 74177 CT ABD & PELVIS W/CONTRAST: CPT

## 2022-09-12 PROCEDURE — 83735 ASSAY OF MAGNESIUM: CPT

## 2022-09-12 PROCEDURE — 83690 ASSAY OF LIPASE: CPT

## 2022-09-12 PROCEDURE — 85025 COMPLETE CBC W/AUTO DIFF WBC: CPT

## 2022-09-12 PROCEDURE — 82565 ASSAY OF CREATININE: CPT

## 2022-09-12 PROCEDURE — 36415 COLL VENOUS BLD VENIPUNCTURE: CPT

## 2022-09-12 PROCEDURE — 82248 BILIRUBIN DIRECT: CPT

## 2022-09-12 PROCEDURE — 80053 COMPREHEN METABOLIC PANEL: CPT

## 2022-09-12 PROCEDURE — 6360000004 HC RX CONTRAST MEDICATION: Performed by: NURSE PRACTITIONER

## 2022-09-12 RX ADMIN — IOPAMIDOL 75 ML: 755 INJECTION, SOLUTION INTRAVENOUS at 15:14

## 2022-09-12 RX ADMIN — IOPAMIDOL 50 ML: 612 INJECTION, SOLUTION INTRAVENOUS at 15:15

## 2022-09-15 RX ORDER — DICYCLOMINE HCL 20 MG
20 TABLET ORAL EVERY 6 HOURS
COMMUNITY

## 2022-09-15 NOTE — PROGRESS NOTES
[facility] ENDOSCOPY COLONOSCOPY PRE-OPERATIVE INSTRUCTIONS    Procedure date__09/20/2022_______Arrival time_0900___________        Surgery time__1000__________       Clear liquids the day before the procedure. Do not eat or drink anything within 5 hours of your procedure. This includes water chewing gum, mints and ice chips. You may brush your teeth and gargle the morning of your surgery, but do not swallow the water    You may be asked to stop blood thinners such as Coumadin, Plavix, Fragmin, Lovenox, etc., or any anti-inflammatories such as:  Aspirin, Ibuprofen, Advil, Naproxen prior to your procedure. We also ask that you stop any OTC medications such as fish oil, vitamin E, glucosamine, garlic, Multivitamins, COQ 10, etc.    You must make arrangements for a responsible adult to arrive with you and stay in our waiting area during your procedure. They will also need to take you home after your procedure. For your safety you will not be allowed to leave alone or drive yourself home. Also for your safety, it is strongly suggested that someone stay with you the first 24 hours after your procedure. For your comfort, please wear simple loose fitting clothing to the center. Please do not bring valuables. If you have a living will and a durable power of  for healthcare, please bring in a copy.      You will need to bring a photo ID and insurance card    Our goal is to provide you with excellent care so if you have any questions, please contact us at the [facility] at [phone]         Please note these are generalized instructions for all colonoscopy cases, you may be provided with more specific instructions if necessary

## 2022-09-19 ENCOUNTER — ANESTHESIA EVENT (OUTPATIENT)
Dept: ENDOSCOPY | Age: 77
End: 2022-09-19
Payer: MEDICARE

## 2022-09-20 ENCOUNTER — ANESTHESIA (OUTPATIENT)
Dept: ENDOSCOPY | Age: 77
End: 2022-09-20
Payer: MEDICARE

## 2022-09-20 ENCOUNTER — HOSPITAL ENCOUNTER (OUTPATIENT)
Age: 77
Setting detail: OUTPATIENT SURGERY
Discharge: HOME | End: 2022-09-20
Attending: INTERNAL MEDICINE | Admitting: INTERNAL MEDICINE
Payer: MEDICARE

## 2022-09-20 VITALS
DIASTOLIC BLOOD PRESSURE: 73 MMHG | HEART RATE: 58 BPM | TEMPERATURE: 97.3 F | HEIGHT: 61 IN | OXYGEN SATURATION: 98 % | RESPIRATION RATE: 18 BRPM | SYSTOLIC BLOOD PRESSURE: 145 MMHG | BODY MASS INDEX: 40.82 KG/M2 | WEIGHT: 216.2 LBS

## 2022-09-20 DIAGNOSIS — K62.5 RECTAL BLEEDING: ICD-10-CM

## 2022-09-20 PROCEDURE — 3609010600 HC COLONOSCOPY POLYPECTOMY SNARE/COLD BIOPSY: Performed by: INTERNAL MEDICINE

## 2022-09-20 PROCEDURE — 88305 TISSUE EXAM BY PATHOLOGIST: CPT

## 2022-09-20 PROCEDURE — 7100000010 HC PHASE II RECOVERY - FIRST 15 MIN: Performed by: INTERNAL MEDICINE

## 2022-09-20 PROCEDURE — 7100000011 HC PHASE II RECOVERY - ADDTL 15 MIN: Performed by: INTERNAL MEDICINE

## 2022-09-20 PROCEDURE — 2500000003 HC RX 250 WO HCPCS

## 2022-09-20 PROCEDURE — 3700000000 HC ANESTHESIA ATTENDED CARE: Performed by: INTERNAL MEDICINE

## 2022-09-20 PROCEDURE — 2580000003 HC RX 258: Performed by: STUDENT IN AN ORGANIZED HEALTH CARE EDUCATION/TRAINING PROGRAM

## 2022-09-20 PROCEDURE — 6360000002 HC RX W HCPCS

## 2022-09-20 PROCEDURE — 3700000001 HC ADD 15 MINUTES (ANESTHESIA): Performed by: INTERNAL MEDICINE

## 2022-09-20 PROCEDURE — 2709999900 HC NON-CHARGEABLE SUPPLY: Performed by: INTERNAL MEDICINE

## 2022-09-20 RX ORDER — SODIUM CHLORIDE 0.9 % (FLUSH) 0.9 %
5-40 SYRINGE (ML) INJECTION PRN
Status: DISCONTINUED | OUTPATIENT
Start: 2022-09-20 | End: 2022-09-20 | Stop reason: HOSPADM

## 2022-09-20 RX ORDER — SODIUM CHLORIDE 9 MG/ML
INJECTION, SOLUTION INTRAVENOUS CONTINUOUS
Status: DISCONTINUED | OUTPATIENT
Start: 2022-09-20 | End: 2022-09-20 | Stop reason: HOSPADM

## 2022-09-20 RX ORDER — SODIUM CHLORIDE 0.9 % (FLUSH) 0.9 %
5-40 SYRINGE (ML) INJECTION EVERY 12 HOURS SCHEDULED
Status: DISCONTINUED | OUTPATIENT
Start: 2022-09-20 | End: 2022-09-20 | Stop reason: HOSPADM

## 2022-09-20 RX ORDER — PROPOFOL 10 MG/ML
INJECTION, EMULSION INTRAVENOUS PRN
Status: DISCONTINUED | OUTPATIENT
Start: 2022-09-20 | End: 2022-09-20 | Stop reason: SDUPTHER

## 2022-09-20 RX ORDER — LIDOCAINE HYDROCHLORIDE 20 MG/ML
INJECTION, SOLUTION EPIDURAL; INFILTRATION; INTRACAUDAL; PERINEURAL PRN
Status: DISCONTINUED | OUTPATIENT
Start: 2022-09-20 | End: 2022-09-20 | Stop reason: SDUPTHER

## 2022-09-20 RX ORDER — SODIUM CHLORIDE 9 MG/ML
INJECTION, SOLUTION INTRAVENOUS PRN
Status: DISCONTINUED | OUTPATIENT
Start: 2022-09-20 | End: 2022-09-20 | Stop reason: HOSPADM

## 2022-09-20 RX ADMIN — PROPOFOL 30 MG: 10 INJECTION, EMULSION INTRAVENOUS at 10:01

## 2022-09-20 RX ADMIN — PROPOFOL 30 MG: 10 INJECTION, EMULSION INTRAVENOUS at 09:55

## 2022-09-20 RX ADMIN — PROPOFOL 70 MG: 10 INJECTION, EMULSION INTRAVENOUS at 09:51

## 2022-09-20 RX ADMIN — PROPOFOL 30 MG: 10 INJECTION, EMULSION INTRAVENOUS at 09:57

## 2022-09-20 RX ADMIN — PROPOFOL 10 MG: 10 INJECTION, EMULSION INTRAVENOUS at 09:59

## 2022-09-20 RX ADMIN — PROPOFOL 20 MG: 10 INJECTION, EMULSION INTRAVENOUS at 09:53

## 2022-09-20 RX ADMIN — LIDOCAINE HYDROCHLORIDE 60 MG: 20 INJECTION, SOLUTION EPIDURAL; INFILTRATION; INTRACAUDAL; PERINEURAL at 09:51

## 2022-09-20 RX ADMIN — SODIUM CHLORIDE: 9 INJECTION, SOLUTION INTRAVENOUS at 09:29

## 2022-09-20 ASSESSMENT — PAIN - FUNCTIONAL ASSESSMENT: PAIN_FUNCTIONAL_ASSESSMENT: NONE - DENIES PAIN

## 2022-09-20 ASSESSMENT — PAIN SCALES - GENERAL
PAINLEVEL_OUTOF10: 0

## 2022-09-20 NOTE — ANESTHESIA PRE PROCEDURE
perforation     Prolonged emergence from general anesthesia     Sleep apnea     CPAP at night    Thrombocytopenia, unspecified 2021    Venous insufficiency     Wears glasses        Past Surgical History:        Procedure Laterality Date    BLEPHAROPLASTY Left     CHOLECYSTECTOMY      COLONOSCOPY  2011    normal-daniel    CYST REMOVAL Left 2021    eye    FOOT SURGERY Left     spurs removed from left heel    HYSTERECTOMY (CERVIX STATUS UNKNOWN)      JOINT REPLACEMENT Left     TKR    KNEE ARTHROSCOPY Left     ROTATOR CUFF REPAIR Right        Social History:    Social History     Tobacco Use    Smoking status: Former     Packs/day: 0.25     Years: 3.00     Pack years: 0.75     Types: Cigarettes     Start date: 10/21/1961     Quit date: 10/21/1964     Years since quittin.9    Smokeless tobacco: Never   Substance Use Topics    Alcohol use: Yes     Comment: SOCIALLY-wine                                Counseling given: Not Answered      Vital Signs (Current):   Vitals:    09/15/22 0923 22   BP:  (!) 166/58   Pulse:  76   Resp:  16   Temp:  97.4 °F (36.3 °C)   TempSrc:  Temporal   SpO2:  95%   Weight: 215 lb (97.5 kg) 216 lb 3.2 oz (98.1 kg)   Height: 5' 1\" (1.549 m) 5' 1\" (1.549 m)                                              BP Readings from Last 3 Encounters:   22 (!) 166/58   22 (!) 150/76   22 130/70       NPO Status: Time of last liquid consumption:                         Time of last solid consumption:                         Date of last liquid consumption: 22                        Date of last solid food consumption: 22    BMI:   Wt Readings from Last 3 Encounters:   22 216 lb 3.2 oz (98.1 kg)   22 219 lb (99.3 kg)   22 219 lb 6.4 oz (99.5 kg)     Body mass index is 40.85 kg/m².     CBC:   Lab Results   Component Value Date/Time    WBC 7.3 2022 03:10 PM    RBC 4.70 2022 03:10 PM    HGB 12.8 09/12/2022 03:10 PM    HCT 38.3 09/12/2022 03:10 PM    MCV 81.6 09/12/2022 03:10 PM    RDW 14.2 09/12/2022 03:10 PM     09/12/2022 03:10 PM       CMP:   Lab Results   Component Value Date/Time     09/16/2022 10:55 AM    K 3.6 09/16/2022 10:55 AM    K 3.5 12/17/2021 11:50 AM    CL 95 09/16/2022 10:55 AM    CO2 30 09/16/2022 10:55 AM    BUN 13 09/16/2022 10:55 AM    CREATININE 0.7 09/16/2022 10:55 AM    GFRAA >60 09/16/2022 10:55 AM    GFRAA >60 02/28/2013 09:51 AM    AGRATIO 1.7 09/12/2022 03:10 PM    LABGLOM >60 09/16/2022 10:55 AM    LABGLOM 62.5 07/05/2011 08:53 AM    GLUCOSE 100 09/16/2022 10:55 AM    GLUCOSE 98 07/05/2011 08:53 AM    PROT 6.9 09/12/2022 03:10 PM    PROT 6.7 02/28/2013 09:51 AM    CALCIUM 9.5 09/16/2022 10:55 AM    BILITOT 0.5 09/12/2022 03:10 PM    ALKPHOS 123 09/12/2022 03:10 PM    AST 22 09/12/2022 03:10 PM    ALT 23 09/12/2022 03:10 PM       POC Tests: No results for input(s): POCGLU, POCNA, POCK, POCCL, POCBUN, POCHEMO, POCHCT in the last 72 hours.     Coags:   Lab Results   Component Value Date/Time    PROTIME 11.3 08/19/2019 09:32 AM    INR 0.99 08/19/2019 09:32 AM    APTT 28.2 08/19/2019 09:32 AM       HCG (If Applicable): No results found for: PREGTESTUR, PREGSERUM, HCG, HCGQUANT     ABGs: No results found for: PHART, PO2ART, GSV5IWV, NRU8UQS, BEART, H9WRIZQA     Type & Screen (If Applicable):  No results found for: LABABO, LABRH    Drug/Infectious Status (If Applicable):  No results found for: HIV, HEPCAB    COVID-19 Screening (If Applicable):   Lab Results   Component Value Date/Time    COVID19 Not Detected 07/30/2021 04:42 PM           Anesthesia Evaluation  Patient summary reviewed no history of anesthetic complications:   Airway: Mallampati: III  TM distance: >3 FB   Neck ROM: full  Mouth opening: > = 3 FB   Dental: normal exam   (+) upper dentures and lower dentures      Pulmonary: breath sounds clear to auscultation  (+) sleep apnea: on CPAP,  asthma: Cardiovascular:  Exercise tolerance: good (>4 METS),   (+) hypertension:, hyperlipidemia    (-) past MI, CAD,  angina and  CLARKE      Rhythm: regular  Rate: normal                 ROS comment: TTE 2022:  Conclusions      Summary   Left ventricular cavity size is normal with mild concentric left ventricular   hypertrophy. Overall left ventricular systolic function appears normal with an estimated   ejection fraction of 55%. Minimal mitral annular calcification   Mild mitral, tricuspid and aortic regurgitation   The right ventricle is slightly enlarged. TAPSE measures: 2.91 cm. RV S velocity measures: 19.8 cm/s. The right atrium is enlarged. Estimated pulmonary artery systolic pressure is at 42 mmHg assuming a right   atrial pressure of 3 mmHg. Neuro/Psych:   (+) TIA (2019),    (-) CVA           GI/Hepatic/Renal:   (+) PUD, bowel prep, morbid obesity     (-) liver disease and no renal disease       Endo/Other:    (+) hypothyroidism::., .                 Abdominal:   (+) obese,           Vascular: negative vascular ROS. Other Findings:           Anesthesia Plan      MAC     ASA 3     (74 yo F with PMHx of PUD, HLD, HTN, hypothyroid, asthma, LYNNE on cpap, and TIA presenting for colonoscopy. Discussed risks and benefits to sedation including nausea, vomiting, allergic reaction, headache, delayed cognitive recovery, stroke, heart attack, respiratory depression, and death which patient understood and agreed to proceed. The patient was given the opportunity to ask questions and all questions were answered to the patient's satisfaction.  )  Induction: intravenous. Anesthetic plan and risks discussed with patient. Plan discussed with CRNA. This pre-anesthesia assessment may be used as a history and physical.    DOS STAFF ADDENDUM:    Pt seen and examined, chart reviewed (including anesthesia, drug and allergy history).   No interval changes to history and physical examination. Anesthetic plan, risks, benefits, alternatives, and personnel involved discussed with patient. Patient verbalized an understanding and agrees to proceed.       Martell Berrios MD  September 20, 2022  9:28 AM

## 2022-09-20 NOTE — H&P
[facility]   Pre-operative History and Physical    Patient: Pola Gay  : 1945  Acct#:         HISTORY OF PRESENT ILLNESS:    The patient is a 65 y.o. female  who presents with rectal bleeding; history of colonic polyps; family history of colon cancer  Past Medical History:        Diagnosis Date    Asthma     Diverticulitis     Graves disease     Hyperlipidemia     Hypertension     Hyperthyroidism     Hypothyroidism     Lymphedema of both lower extremities 2020    Mild persistent asthma without complication     Osteoarthritis     Peptic ulcer, unspecified site, unspecified as acute or chronic, without mention of hemorrhage or perforation     Prolonged emergence from general anesthesia     Sleep apnea     CPAP at night    Thrombocytopenia, unspecified 2021    Venous insufficiency     Wears glasses      Past Surgical History:        Procedure Laterality Date    BLEPHAROPLASTY Left     CHOLECYSTECTOMY      COLONOSCOPY      normal-daniel    CYST REMOVAL Left 2021    eye    FOOT SURGERY Left     spurs removed from left heel    HYSTERECTOMY (CERVIX STATUS UNKNOWN)      JOINT REPLACEMENT Left     TKR    KNEE ARTHROSCOPY Left     ROTATOR CUFF REPAIR Right      Medications prior to admission:   Prior to Admission medications    Medication Sig Start Date End Date Taking? Authorizing Provider   dicyclomine (BENTYL) 20 MG tablet Take 20 mg by mouth in the morning and 20 mg at noon and 20 mg in the evening and 20 mg before bedtime. Uses as needed.    Yes Historical Provider, MD   amLODIPine (NORVASC) 5 MG tablet TAKE 1 TABLET BY MOUTH DAILY 22   Ward Orozco MD   nebivolol (BYSTOLIC) 10 MG tablet TAKE 1 TABLET BY MOUTH DAILY 22   Ward Orozco MD   pantoprazole (PROTONIX) 40 MG tablet Take 1 tablet by mouth every morning (before breakfast) 22   TIFFANIE Nugent - CNP   levothyroxine (SYNTHROID) 125 MCG tablet TAKE 1 TABLET BY MOUTH DAILY 22   [facility] Evy Camarillo MD   magnesium (MAGNESIUM-OXIDE) 250 MG TABS tablet Take 750 mg by mouth daily    Historical Provider, MD   ezetimibe (ZETIA) 10 MG tablet TAKE 1 TABLET DAILY 22   Raymond Maravilla MD   albuterol sulfate  (90 Base) MCG/ACT inhaler INHALE 2 PUFFS INTO THE LUNGS EVERY 6 HOURS AS NEEDED FOR WHEEZING 22   Raymond Maravilla MD   pravastatin (PRAVACHOL) 20 MG tablet Take 1 tablet by mouth nightly 22   Jon Chaney MD   ALPRAZolam Cordella Loffler) 0.5 MG tablet Take 0.5 mg by mouth nightly as needed for Sleep. Historical Provider, MD   loratadine (CLARITIN) 10 MG tablet Take 10 mg by mouth daily    Historical Provider, MD   Multiple Vitamins-Minerals (THERAPEUTIC MULTIVITAMIN-MINERALS) tablet Take 1 tablet by mouth daily    Historical Provider, MD   vitamin C (ASCORBIC ACID) 500 MG tablet Take 500 mg by mouth daily    Historical Provider, MD   Cholecalciferol (VITAMIN D3) 125 MCG (5000 UT) TABS Take 5,000 Units by mouth daily     Historical Provider, MD   cyanocobalamin ([employer] VITAMIN B12) 1000 MCG tablet Take 1 tablet by mouth daily 4/15/16   Raymond Maravilla MD   aspirin 81 MG tablet Take 81 mg by mouth daily. Historical Provider, MD     Allergies:    Montelukast sodium, Sulfa antibiotics, Valium [diazepam], and Strawberry extract  Social History:   Social History     Socioeconomic History    Marital status:       Spouse name: Not on file    Number of children: 3    Years of education: Not on file    Highest education level: Not on file   Occupational History    Not on file   Tobacco Use    Smoking status: Former     Packs/day: 0.25     Years: 3.00     Pack years: 0.75     Types: Cigarettes     Start date: 10/21/1961     Quit date: 10/21/1964     Years since quittin.9    Smokeless tobacco: Never   Vaping Use    Vaping Use: Never used   Substance and Sexual Activity    Alcohol use: Yes     Comment: SOCIALLY-wine    Drug use: No    Sexual activity: Never   Other Topics

## 2022-09-20 NOTE — BRIEF OP NOTE
Brief Postoperative Note    Smitty Brittle  YOB: 1945  [id number]      Pre-operative Diagnosis: Rectal bleeding    Previous Colonoscopy: Yes  When:  09/19/18  Greater than 3 years? Yes      Post-operative Diagnosis: Same    Procedure: Colonoscopy    The preparation was good.     Anesthesia: [facility]    Surgeons/Assistants: Ami De MD    Estimated Blood Loss: None    Complications: None    Specimens: Was Obtained: Polyp    Findings: See dictated report    Electronically signed by Ami De MD on 7/10/2017 at 7:45 AM

## 2022-09-20 NOTE — ANESTHESIA POSTPROCEDURE EVALUATION
Department of Anesthesiology  Postprocedure Note    Patient: Penny Bryant  MRN: [id number]  YOB: 1945  Date of evaluation: 9/20/2022      Procedure Summary     Date: 09/20/22 Room / Location: [address]    Anesthesia Start: 6866 Anesthesia Stop: 1005    Procedure: COLONOSCOPY POLYPECTOMY SNARE/COLD BIOPSY Diagnosis:       Rectal bleeding      (Rectal bleeding)    Surgeons: Tommy Medina MD Responsible Provider: Eron Montoya MD    Anesthesia Type: MAC ASA Status: 3          Anesthesia Type: No value filed.     Vin Phase I: Vin Score: 10    Vin Phase II: Vin Score: 10      Anesthesia Post Evaluation    Patient location during evaluation: PACU  Patient participation: complete - patient participated  Level of consciousness: awake  Airway patency: patent  Nausea & Vomiting: no nausea and no vomiting  Cardiovascular status: blood pressure returned to baseline  Respiratory status: acceptable  Hydration status: stable  Multimodal analgesia pain management approach

## 2022-09-20 NOTE — OP NOTE
Operative Note      Patient: Penny Bryant  YOB: 1945  MRN: [id number]    Date of Procedure: 9/20/2022    Pre-Op Diagnosis: Rectal bleeding    Post-Op Diagnosis: Same       Procedure(s):  COLONOSCOPY POLYPECTOMY SNARE/COLD BIOPSY    Surgeon(s):  Tommy Medina MD    Assistant:   * No surgical staff found *    Anesthesia: Monitor Anesthesia Care    Estimated Blood Loss (mL): None    Complications: None    Specimens:   ID Type Source Tests Collected by Time Destination   A :  Tissue Colon SURGICAL PATHOLOGY Tommy Medina MD 9/20/2022 1002        Implants:  * No implants in log *      Drains: * No LDAs found *    Findings: See dictated report    Detailed Description of Procedure:   See dictated report    Electronically signed by Tommy Medina MD on 9/20/2022 at 10:13 AM

## 2022-09-21 NOTE — PROCEDURES
[address]                                 PROCEDURE NOTE    PATIENT NAME: Mak Underwood                      :        1945  MED REC NO:   [id number]                          ROOM:  ACCOUNT NO:   [de-identified]                           ADMIT DATE: 2022  PROVIDER:     Marylee Liter A. Manegold, MD    COLONOSCOPY    DATE OF PROCEDURE:  2022    A 76-year-old female, outpatient. REFERRING PROVIDER:  Angely Marques. Roxy Walker MD    INSTRUMENT USED:  Olympus [id number]-H180AL. ANESTHESIA:  The patient was premedicated with Diprivan intravenously as  administered by the Anesthesiology service. INDICATIONS:  The patient was referred for an urgent colonoscopy for an  isolated episode of rectal bleeding. This occurred a couple of weeks  ago and has not recurred. There is an underlying history of colonic  polyps and a family history of colon cancer. The patient would  ordinarily be due for a surveillance colonoscopy next year. PROCEDURE:  The digital and anal exams were remarkable for large  external hemorrhoidal tags. The colonoscope was inserted to the cecum. The prep was good. There was diffuse diverticulosis throughout. There  were small, nonbleeding vascular ectasias in the cecum and proximal  ascending colon areas. There was a little bit of nonspecific erythema  in the rectum which may have been secondary to the colonoscopy prep. The only significant mucosal lesion identified was a sessile 1 cm rectal  polyp that was snared, removed and retrieved. ESTIMATED BLOOD LOSS:  None. IMPRESSION:  1. Colonic polyp, removed, path pending. 2.  Diffuse diverticulosis. 3.  Nonbleeding vascular ectasias of the cecum and proximal ascending  colon. 4.  Large external hemorrhoidal tags. PLAN:  The patient will call the office for biopsy results.   Health  permitting, she would be a candidate for a final surveillance  colonoscopy in 5 years. The bleeding appears to have been perianal and  requires no further evaluation. BLANCA Avina MD    D: 09/20/2022 10:28:09       T: 09/20/2022 10:32:38     [id number]  Job#: [id number]     Doc#: [id number]    CC:  Audrey Gonzalez MD

## 2022-10-03 PROBLEM — I73.9 PERIPHERAL VASCULAR DISEASE (HCC): Status: RESOLVED | Noted: 2021-03-15 | Resolved: 2022-10-03

## 2022-11-11 ENCOUNTER — OFFICE VISIT (OUTPATIENT)
Dept: CARDIOLOGY CLINIC | Age: 77
End: 2022-11-11
Payer: MEDICARE

## 2022-11-11 VITALS
WEIGHT: 215 LBS | DIASTOLIC BLOOD PRESSURE: 72 MMHG | SYSTOLIC BLOOD PRESSURE: 140 MMHG | HEART RATE: 61 BPM | OXYGEN SATURATION: 96 % | BODY MASS INDEX: 40.59 KG/M2 | HEIGHT: 61 IN

## 2022-11-11 DIAGNOSIS — Q21.12 PFO (PATENT FORAMEN OVALE): ICD-10-CM

## 2022-11-11 DIAGNOSIS — I63.9 CEREBROVASCULAR ACCIDENT (CVA), UNSPECIFIED MECHANISM (HCC): Primary | ICD-10-CM

## 2022-11-11 PROCEDURE — G8427 DOCREV CUR MEDS BY ELIG CLIN: HCPCS | Performed by: INTERNAL MEDICINE

## 2022-11-11 PROCEDURE — 1036F TOBACCO NON-USER: CPT | Performed by: INTERNAL MEDICINE

## 2022-11-11 PROCEDURE — 99214 OFFICE O/P EST MOD 30 MIN: CPT | Performed by: INTERNAL MEDICINE

## 2022-11-11 PROCEDURE — G8399 PT W/DXA RESULTS DOCUMENT: HCPCS | Performed by: INTERNAL MEDICINE

## 2022-11-11 PROCEDURE — G8484 FLU IMMUNIZE NO ADMIN: HCPCS | Performed by: INTERNAL MEDICINE

## 2022-11-11 PROCEDURE — 1090F PRES/ABSN URINE INCON ASSESS: CPT | Performed by: INTERNAL MEDICINE

## 2022-11-11 PROCEDURE — G8417 CALC BMI ABV UP PARAM F/U: HCPCS | Performed by: INTERNAL MEDICINE

## 2022-11-11 PROCEDURE — 1123F ACP DISCUSS/DSCN MKR DOCD: CPT | Performed by: INTERNAL MEDICINE

## 2022-11-11 PROCEDURE — 3074F SYST BP LT 130 MM HG: CPT | Performed by: INTERNAL MEDICINE

## 2022-11-11 PROCEDURE — 3078F DIAST BP <80 MM HG: CPT | Performed by: INTERNAL MEDICINE

## 2022-11-11 NOTE — PROGRESS NOTES
[facility]  Cardiology Progress Note    Don Mclaughlin  1945    November 11, 2022      Reason for Referral: acute ischemic stroke, PFO per TTE with bubble study     CC: \"I am feeling good. \"       Subjective:     History of Present Illness:    Don Mclaughlin is a 65 y.o. patient with a PMH significant for former smoker, Graves Disease, HTN, HLD, and who is seeing us today for management of recent acute ischemic stroke, PFO revealed per TTE with bubble study. Neurology followed during admission and we discussed possible etiology. No physical limitations. Use of cane. She has hx of panic attack resolved by xanax. States she has been under a lot of family stress. He daughter lives with her. 30 day Event monitor 11/2019 SR with 1 episodes of 20 beat atrial run. Today, she states that she is feeling good. She continues to wear compression stockings. She does have an occasional chest pain but does not happen very often. She does use a cane for ambulation. Patient denies exertional chest pain/pressure, dyspnea at rest, CLARKE, PND, orthopnea, palpitations, lightheadedness, weight changes, changes in LE edema, and syncope. Past Medical History:   has a past medical history of Asthma, BMI 40.0-44.9, adult ([address] Diverticulitis, Graves disease, Hyperlipidemia, Hypertension, Hyperthyroidism, Hypothyroidism, Lymphedema of both lower extremities, Mild persistent asthma without complication, Osteoarthritis, Peptic ulcer, unspecified site, unspecified as acute or chronic, without mention of hemorrhage or perforation, Peripheral vascular disease ([address] Prolonged emergence from general anesthesia, Sleep apnea, Thrombocytopenia, unspecified, Venous insufficiency, and Wears glasses. Surgical History:   has a past surgical history that includes Hysterectomy; Cholecystectomy (2001); Colonoscopy (2011); Rotator cuff repair (Right);  Foot surgery (Left); Knee arthroscopy (Left); blepharoplasty (Left); cyst removal (Left, 12/06/2021); joint replacement (Left); and Colonoscopy (N/A, 9/20/2022). Social History:   reports that she quit smoking about 58 years ago. Her smoking use included cigarettes. She started smoking about 61 years ago. She has a 0.75 pack-year smoking history. She has never used smokeless tobacco. She reports current alcohol use. She reports that she does not use drugs. Family History:  family history includes Colon Cancer in her father; Diabetes in her sister; Hypertension in her mother. Home Medications:  Were reviewed and are listed in nursing record and/or below  Prior to Admission medications    Medication Sig Start Date End Date Taking? Authorizing Provider   lisinopril (PRINIVIL;ZESTRIL) 20 MG tablet Take 20 mg by mouth daily   Yes Historical Provider, MD   dicyclomine (BENTYL) 20 MG tablet Take 20 mg by mouth in the morning and 20 mg at noon and 20 mg in the evening and 20 mg before bedtime. Uses as needed.    Yes Historical Provider, MD   amLODIPine (NORVASC) 5 MG tablet TAKE 1 TABLET BY MOUTH DAILY  Patient taking differently: Take 5 mg by mouth 2 times daily 9/13/22  Yes Eun Briones MD   nebivolol (BYSTOLIC) 10 MG tablet TAKE 1 TABLET BY MOUTH DAILY 9/13/22  Yes Eun Briones MD   pantoprazole (PROTONIX) 40 MG tablet Take 1 tablet by mouth every morning (before breakfast) 9/12/22  Yes TIFFANIE Solorio CNP   levothyroxine (SYNTHROID) 125 MCG tablet TAKE 1 TABLET BY MOUTH DAILY 9/6/22  Yes Eun Briones MD   magnesium (MAGNESIUM-OXIDE) 250 MG TABS tablet Take 750 mg by mouth daily   Yes Historical Provider, MD   ezetimibe (ZETIA) 10 MG tablet TAKE 1 TABLET DAILY 6/30/22  Yes Eun Briones MD   albuterol sulfate  (90 Base) MCG/ACT inhaler INHALE 2 PUFFS INTO THE LUNGS EVERY 6 HOURS AS NEEDED FOR WHEEZING 4/4/22  Yes Eun Briones MD   pravastatin (PRAVACHOL) 20 MG tablet Take 1 tablet by mouth nightly 2/7/22  Yes Deana Block MD   ALPRAZolam [facility]) 0.5 MG tablet Take 0.5 mg by mouth nightly as needed for Sleep. Yes Historical Provider, MD   loratadine (CLARITIN) 10 MG tablet Take 10 mg by mouth daily   Yes Historical Provider, MD   Multiple Vitamins-Minerals (THERAPEUTIC MULTIVITAMIN-MINERALS) tablet Take 1 tablet by mouth daily   Yes Historical Provider, MD   vitamin C (ASCORBIC ACID) 500 MG tablet Take 500 mg by mouth daily   Yes Historical Provider, MD   Cholecalciferol (VITAMIN D3) 125 MCG (5000 UT) TABS Take 5,000 Units by mouth daily    Yes Historical Provider, MD   cyanocobalamin ([employer] VITAMIN B12) 1000 MCG tablet Take 1 tablet by mouth daily 4/15/16  Yes Nain Garrett MD   aspirin 81 MG tablet Take 81 mg by mouth daily. Yes Historical Provider, MD        Allergies:  Montelukast sodium, Sulfa antibiotics, Valium [diazepam], and Strawberry extract       Review of Systems: all reviewed and refer to HPI   Constitutional: no unanticipated weight loss. There's been no change in energy level, sleep pattern, or activity level. No fevers, chills. Eyes: No visual changes or diplopia. No scleral icterus. ENT: No Headaches, hearing loss or vertigo. No mouth sores or sore throat. Cardiovascular: No Chest pain, tightness or discomfort. No Shortness of breath. No Dyspnea on exertion, Orthopnea, Paroxysmal nocturnal dyspnea or breathlessness at rest.  No Palpitations. No Syncope ('blackouts', 'faints', 'collapse') or dizziness. Respiratory: No cough or wheezing, no sputum production. No hematemesis. Gastrointestinal: No abdominal pain, appetite loss, blood in stools. No change in bowel or bladder habits. Genitourinary: No dysuria, trouble voiding, or hematuria. Musculoskeletal:  No gait disturbance, no joint complaints. Integumentary: No rash or pruritis. Neurological: No headache, diplopia, change in muscle strength, numbness or tingling. Psychiatric: No anxiety or depression. Endocrine: No temperature intolerance.  No excessive thirst, fluid intake, or urination. No tremor. Hematologic/Lymphatic: No abnormal bruising or bleeding, blood clots or swollen lymph nodes. Allergic/Immunologic: No nasal congestion or hives. Objective:     PHYSICAL EXAM:      Vitals:    11/11/22 1433   BP: (!) 140/72   Pulse: 61   SpO2: 96%   Weight: 215 lb (97.5 kg)   Height: 5' 1\" (1.549 m)      Weight: 215 lb (97.5 kg)       General Appearance:  Alert, cooperative, no distress, appears stated age. Head:  Normocephalic, without obvious abnormality, atraumatic. Eyes:  Pupils equal and round. No scleral icterus. Mouth: Moist mucosa, no pharyngeal erythema. Nose: Nares normal. No drainage or sinus tenderness. Neck: Supple, symmetrical, trachea midline. No adenopathy. No tenderness/mass/nodules. No carotid bruit or elevated JVD. Lungs:   Respiratory Effort: Normal   Auscultation: Clear to auscultation bilaterally, respirations unlabored. No wheeze, rales   Chest Wall:  No tenderness or deformity. Cardiovascular:    Pulses  Palpation: normal   Ascultation: Regular rate, S1/ S2 normal. No murmur, rub, or gallop. 2+ radial and pedal pulses, symmetric  Carotid  Femoral   Abdomen and Gastrointestinal:   Soft, non-tender, bowel sounds active. Liver and Spleen  Masses   Musculoskeletal: No muscle wasting  Back  Gait   Extremities: Extremities normal, atraumatic. No cyanosis or edema. No cyanosis clubbing       Skin: Inspection and palpation performed, no rashes or lesions. Pysch: Normal mood and affect.  Alert and oriented to time place person   Neurologic: Normal gross motor and sensory exam.       Labs     All labs have been reviewed    Lab Results   Component Value Date/Time    WBC 6.1 10/03/2022 09:24 AM    RBC 4.34 10/03/2022 09:24 AM    HGB 12.2 10/03/2022 09:24 AM    HCT 36.5 10/03/2022 09:24 AM    MCV 84.1 10/03/2022 09:24 AM    RDW 15.3 10/03/2022 09:24 AM     10/03/2022 09:24 AM     Lab Results   Component Value Date/Time     11/10/2022 10:44 AM    K 4.1 11/10/2022 10:44 AM    K 3.5 2021 11:50 AM     11/10/2022 10:44 AM    CO2 25 11/10/2022 10:44 AM    BUN 23 11/10/2022 10:44 AM    CREATININE 0.8 11/10/2022 10:44 AM    GFRAA >60 10/13/2022 11:03 AM    GFRAA >60 2013 09:51 AM    AGRATIO 1.4 10/03/2022 09:24 AM    LABGLOM >60 11/10/2022 10:44 AM    GLUCOSE 100 11/10/2022 10:44 AM    GLUCOSE 98 2011 08:53 AM    PROT 6.5 10/03/2022 09:24 AM    PROT 6.7 2013 09:51 AM    CALCIUM 9.7 11/10/2022 10:44 AM    BILITOT 0.3 10/03/2022 09:24 AM    ALKPHOS 108 10/03/2022 09:24 AM    AST 11 10/03/2022 09:24 AM    ALT 11 10/03/2022 09:24 AM     No results found for: PTINR  Lab Results   Component Value Date    LABA1C 5.4 2022     Lab Results   Component Value Date    TROPONINI <0.01 2021       Cardiac, Vascular and Imaging Data all Personally Reviewed in Detail by Myself      EK2020 Sinus bradycardia    Echocardiogram:   ECHO 2022  Left ventricular cavity size is normal with mild concentric left ventricular  hypertrophy. Overall left ventricular systolic function appears normal with an estimated  ejection fraction of 55%. Minimal mitral annular calcification  Mild mitral, tricuspid and aortic regurgitation  The right ventricle is slightly enlarged. TAPSE measures: 2.91 cm. RV S velocity measures: 19.8 cm/s. The right atrium is enlarged. Estimated pulmonary artery systolic pressure is at 42 mmHg assuming a right  atrial pressure of 3 mmHg. ECHO 10/17/19  Summary  Suboptimal image quality. Normal left ventricle size, wall thickness, and systolic function with an  estimated ejection fraction of 55-60%. No regional wall motion abnormalities are seen. Normal right ventricular size and function. The left atrium is mildly dilated. Mild aortic and tricuspid regurgitation. Trivial mitral regurgitation.   A bubble study was performed and shows evidence of right to left shunting  consistent with a patent foramen ovale    Stress Test: none     Cath: none     Other imaging:     MRI brain: 10/17/19  Punctate acute infarct in the hiram. No intracranial hemorrhage or mass   effect. CT head wo contrast:10/17/19  No acute intracranial abnormality    CTA head and neck:10/17/19  Unremarkable CTA of the head and neck    JUSTINE 5/8/2020  Normal LV function  RV Function is normal  Mild MR  mild-moderate AI  A bubble study was performed and fails to show evidence of right to left  shunting due to the increased pressure of the left atrium     Assessment and Plan     Ischemic stroke infarcts noted in the right posterior hiram region  No signs/symptoms of stroke. Continue Asa and statin therapy. Patent foramen ovale   Continue Asa and statin therapy. Repeat echocardiogram during the next visit. Hypertension, essential   Controlled. Continue current medical management. Advised her to adhere to a low sodium diet. Hyperlipemia, mixed   Continue Pravachol and Zetia. Graves Disease  Managed by PCP. Follow up in 8 months. Thank you for allowing us to participate in the care of Toya Cardenas. Please do not hesitate to contact me if you have any questions. Janice Rosales MD, MPH    [facility], [address] [place] [address]  Ph: [phone]  Fax: [phone]      This note was scribed in the presence of Dr Delmar Crump, by Angeline Jacobsen RN  Physician Attestation:  The scribes documentation has been prepared under my direction and personally reviewed by me in its entirety. I confirm that the note above accurately reflects all work, treatment, procedures, and medical decision making performed by me.

## 2022-11-14 PROBLEM — R10.32 LEFT LOWER QUADRANT ABDOMINAL PAIN: Status: ACTIVE | Noted: 2022-11-14

## 2022-12-13 ENCOUNTER — APPOINTMENT (OUTPATIENT)
Dept: GENERAL RADIOLOGY | Age: 77
End: 2022-12-13
Payer: MEDICARE

## 2022-12-13 ENCOUNTER — HOSPITAL ENCOUNTER (EMERGENCY)
Age: 77
Discharge: HOME | End: 2022-12-13
Payer: MEDICARE

## 2022-12-13 VITALS
TEMPERATURE: 98.4 F | OXYGEN SATURATION: 96 % | RESPIRATION RATE: 20 BRPM | BODY MASS INDEX: 40.28 KG/M2 | WEIGHT: 213.19 LBS | SYSTOLIC BLOOD PRESSURE: 159 MMHG | DIASTOLIC BLOOD PRESSURE: 87 MMHG | HEART RATE: 68 BPM

## 2022-12-13 DIAGNOSIS — Z20.822 LAB TEST NEGATIVE FOR COVID-19 VIRUS: ICD-10-CM

## 2022-12-13 DIAGNOSIS — J45.20 MILD INTERMITTENT ASTHMA WITHOUT COMPLICATION: ICD-10-CM

## 2022-12-13 DIAGNOSIS — I10 ELEVATED BLOOD PRESSURE READING IN OFFICE WITH DIAGNOSIS OF HYPERTENSION: ICD-10-CM

## 2022-12-13 DIAGNOSIS — B34.9 VIRAL ILLNESS: Primary | ICD-10-CM

## 2022-12-13 LAB
A/G RATIO: 1.1 (ref 1.1–2.2)
ALBUMIN SERPL-MCNC: 3.5 G/DL (ref 3.4–5)
ALP BLD-CCNC: 119 U/L (ref 40–129)
ALT SERPL-CCNC: 17 U/L (ref 10–40)
ANION GAP SERPL CALCULATED.3IONS-SCNC: 10 MMOL/L (ref 3–16)
AST SERPL-CCNC: 22 U/L (ref 15–37)
BASOPHILS ABSOLUTE: 0 K/UL (ref 0–0.2)
BASOPHILS RELATIVE PERCENT: 0.5 %
BILIRUB SERPL-MCNC: <0.2 MG/DL (ref 0–1)
BUN BLDV-MCNC: 14 MG/DL (ref 7–20)
CALCIUM SERPL-MCNC: 9.5 MG/DL (ref 8.3–10.6)
CHLORIDE BLD-SCNC: 94 MMOL/L (ref 99–110)
CO2: 31 MMOL/L (ref 21–32)
CREAT SERPL-MCNC: 0.6 MG/DL (ref 0.6–1.2)
EKG ATRIAL RATE: 71 BPM
EKG DIAGNOSIS: NORMAL
EKG P AXIS: 73 DEGREES
EKG P-R INTERVAL: 174 MS
EKG Q-T INTERVAL: 420 MS
EKG QRS DURATION: 100 MS
EKG QTC CALCULATION (BAZETT): 456 MS
EKG R AXIS: 3 DEGREES
EKG T AXIS: 4 DEGREES
EKG VENTRICULAR RATE: 71 BPM
EOSINOPHILS ABSOLUTE: 0 K/UL (ref 0–0.6)
EOSINOPHILS RELATIVE PERCENT: 1.3 %
GFR SERPL CREATININE-BSD FRML MDRD: >60 ML/MIN/{1.73_M2}
GLUCOSE BLD-MCNC: 101 MG/DL (ref 70–99)
HCT VFR BLD CALC: 39.3 % (ref 36–48)
HEMOGLOBIN: 12.9 G/DL (ref 12–16)
LYMPHOCYTES ABSOLUTE: 1 K/UL (ref 1–5.1)
LYMPHOCYTES RELATIVE PERCENT: 28.9 %
MCH RBC QN AUTO: 28.6 PG (ref 26–34)
MCHC RBC AUTO-ENTMCNC: 32.9 G/DL (ref 31–36)
MCV RBC AUTO: 86.9 FL (ref 80–100)
MONOCYTES ABSOLUTE: 0.5 K/UL (ref 0–1.3)
MONOCYTES RELATIVE PERCENT: 12.8 %
NEUTROPHILS ABSOLUTE: 2 K/UL (ref 1.7–7.7)
NEUTROPHILS RELATIVE PERCENT: 56.5 %
PDW BLD-RTO: 14.2 % (ref 12.4–15.4)
PLATELET # BLD: 131 K/UL (ref 135–450)
PMV BLD AUTO: 7.3 FL (ref 5–10.5)
POTASSIUM REFLEX MAGNESIUM: 3.6 MMOL/L (ref 3.5–5.1)
PRO-BNP: 93 PG/ML (ref 0–449)
RAPID INFLUENZA  B AGN: NEGATIVE
RAPID INFLUENZA A AGN: NEGATIVE
RBC # BLD: 4.52 M/UL (ref 4–5.2)
SARS-COV-2, NAAT: NOT DETECTED
SODIUM BLD-SCNC: 135 MMOL/L (ref 136–145)
TOTAL PROTEIN: 6.6 G/DL (ref 6.4–8.2)
TROPONIN: <0.01 NG/ML
WBC # BLD: 3.6 K/UL (ref 4–11)

## 2022-12-13 PROCEDURE — 93005 ELECTROCARDIOGRAM TRACING: CPT | Performed by: EMERGENCY MEDICINE

## 2022-12-13 PROCEDURE — 94640 AIRWAY INHALATION TREATMENT: CPT

## 2022-12-13 PROCEDURE — 71046 X-RAY EXAM CHEST 2 VIEWS: CPT

## 2022-12-13 PROCEDURE — 83880 ASSAY OF NATRIURETIC PEPTIDE: CPT

## 2022-12-13 PROCEDURE — 87635 SARS-COV-2 COVID-19 AMP PRB: CPT

## 2022-12-13 PROCEDURE — 87804 INFLUENZA ASSAY W/OPTIC: CPT

## 2022-12-13 PROCEDURE — 85025 COMPLETE CBC W/AUTO DIFF WBC: CPT

## 2022-12-13 PROCEDURE — 6370000000 HC RX 637 (ALT 250 FOR IP): Performed by: EMERGENCY MEDICINE

## 2022-12-13 PROCEDURE — 94760 N-INVAS EAR/PLS OXIMETRY 1: CPT

## 2022-12-13 PROCEDURE — 84484 ASSAY OF TROPONIN QUANT: CPT

## 2022-12-13 PROCEDURE — 99285 EMERGENCY DEPT VISIT HI MDM: CPT

## 2022-12-13 PROCEDURE — 80053 COMPREHEN METABOLIC PANEL: CPT

## 2022-12-13 PROCEDURE — 93010 ELECTROCARDIOGRAM REPORT: CPT | Performed by: INTERNAL MEDICINE

## 2022-12-13 RX ORDER — IPRATROPIUM BROMIDE AND ALBUTEROL SULFATE 2.5; .5 MG/3ML; MG/3ML
1 SOLUTION RESPIRATORY (INHALATION)
Status: DISCONTINUED | OUTPATIENT
Start: 2022-12-13 | End: 2022-12-13 | Stop reason: HOSPADM

## 2022-12-13 RX ORDER — ALBUTEROL SULFATE 90 UG/1
2 AEROSOL, METERED RESPIRATORY (INHALATION) EVERY 6 HOURS PRN
Qty: 54 G | Refills: 5 | Status: SHIPPED | OUTPATIENT
Start: 2022-12-13 | End: 2022-12-13

## 2022-12-13 RX ADMIN — IPRATROPIUM BROMIDE AND ALBUTEROL SULFATE 1 AMPULE: .5; 3 SOLUTION RESPIRATORY (INHALATION) at 14:02

## 2022-12-13 ASSESSMENT — PAIN - FUNCTIONAL ASSESSMENT
PAIN_FUNCTIONAL_ASSESSMENT: NONE - DENIES PAIN
PAIN_FUNCTIONAL_ASSESSMENT: NONE - DENIES PAIN

## 2022-12-13 NOTE — ED PROVIDER NOTES
[address]  [address] [place] [address]  Dept: [phone]  Loc: [address] ENCOUNTER        This patient was not seen or evaluated by the attending physician. I evaluated this patient, the attending physician was available for consultation. CHIEF COMPLAINT    Chief Complaint   Patient presents with    Shortness of Breath     Pt states that she is SOB, her pcp called in medication pt did not get medication that was ordered pt states she does not feel better        HPI    Justin Miller is a 65 y.o. nontoxic, well-appearing, mildly distressed female with medical history including, not limited to, asthma, elevated BMI, diverticulitis, Graves' disease, hypertension, hyperlipidemia, peptic ulcer, peripheral vascular disease, thrombocytopenia, venous insufficiency who presents to ED to the ED with nasal and chest congestion, shortness of breath, body \"aches\", lightheadedness, cough productive of greenish-yellow phlegm, chills, and change in ability to smell/taste. Onset was 5 days ago. The patient was seen by another provider P PCPs office today and prescribed Tessalon, Phenergan with codeine and was previously taking cefdinir and promethazine for this problem. The duration has been constant since the onset. The context is a spontaneous onset. The pain severity is 4/10. There are no alleviating factors. Review of Systems   Constitutional:  Positive for chills. Negative for diaphoresis, fatigue and fever. Change in ability to smell/taste   HENT:  Positive for congestion. Negative for sore throat. Eyes:  Negative for pain and visual disturbance. Respiratory:  Positive for cough and shortness of breath. Cardiovascular:  Negative for chest pain and leg swelling. Gastrointestinal:  Negative for abdominal pain, anal bleeding, nausea and vomiting.    Genitourinary:  Negative for difficulty urinating, dysuria, frequency and urgency. Musculoskeletal:  Positive for myalgias. Negative for back pain and neck pain. Skin:  Negative for rash and wound. Neurological:  Positive for light-headedness. Negative for dizziness. Hematological: Negative. Psychiatric/Behavioral: Negative.          PAST MEDICAL AND SURGICAL HISTORY    Past Medical History:   Diagnosis Date    Asthma     BMI 40.0-44.9, adult ([facility] [address] 7/21/2020    Diverticulitis     Graves disease     Hyperlipidemia     Hypertension     Hyperthyroidism     Hypothyroidism     Lymphedema of both lower extremities 2/7/2020    Mild persistent asthma without complication     Osteoarthritis     Peptic ulcer, unspecified site, unspecified as acute or chronic, without mention of hemorrhage or perforation     Peripheral vascular disease ([facility] [address] 3/15/2021    Prolonged emergence from general anesthesia     Sleep apnea     CPAP at night    Thrombocytopenia, unspecified 8/26/2021    Venous insufficiency     Wears glasses      Past Surgical History:   Procedure Laterality Date    BLEPHAROPLASTY Left     CHOLECYSTECTOMY  2001    COLONOSCOPY  2011    normal-daniel    COLONOSCOPY N/A 9/20/2022    COLONOSCOPY POLYPECTOMY SNARE/COLD BIOPSY performed by Debbie Brock MD at [address] Left 12/06/2021    eye    FOOT SURGERY Left     spurs removed from left heel    HYSTERECTOMY (CERVIX STATUS UNKNOWN)      JOINT REPLACEMENT Left     TKR    KNEE ARTHROSCOPY Left     ROTATOR CUFF REPAIR Right        CURRENT MEDICATIONS  (may include discharge medications prescribed in the ED)  Current Outpatient Rx   Medication Sig Dispense Refill    cefdinir (OMNICEF) 300 MG capsule Take 1 capsule by mouth 2 times daily for 7 days 14 capsule 0    promethazine-dextromethorphan (PROMETHAZINE-DM) 6.25-15 MG/5ML syrup Take 5 mLs by mouth 4 times daily as needed for Cough 140 mL 0    benzonatate (TESSALON) 100 MG capsule Take 1 capsule by mouth 3 times daily as needed for Cough 30 capsule 0    promethazine-codeine (PHENERGAN WITH CODEINE) 6.25-10 MG/5ML syrup Take 5 mLs by mouth every 4 hours as needed for Cough for up to 4 days. 118 mL 0    albuterol sulfate HFA (PROVENTIL;VENTOLIN;PROAIR) 108 (90 Base) MCG/ACT inhaler Inhale 2 puffs into the lungs every 6 hours as needed for Wheezing 54 g 5    lisinopril (PRINIVIL;ZESTRIL) 20 MG tablet TAKE 1 TABLET BY MOUTH DAILY 90 tablet 1    dicyclomine (BENTYL) 20 MG tablet Take 20 mg by mouth in the morning and 20 mg at noon and 20 mg in the evening and 20 mg before bedtime. Uses as needed. amLODIPine (NORVASC) 5 MG tablet TAKE 1 TABLET BY MOUTH DAILY (Patient taking differently: Take 5 mg by mouth 2 times daily) 90 tablet 3    nebivolol (BYSTOLIC) 10 MG tablet TAKE 1 TABLET BY MOUTH DAILY 90 tablet 3    pantoprazole (PROTONIX) 40 MG tablet Take 1 tablet by mouth every morning (before breakfast) 30 tablet 0    levothyroxine (SYNTHROID) 125 MCG tablet TAKE 1 TABLET BY MOUTH DAILY 90 tablet 3    magnesium (MAGNESIUM-OXIDE) 250 MG TABS tablet Take 750 mg by mouth daily      ezetimibe (ZETIA) 10 MG tablet TAKE 1 TABLET DAILY 90 tablet 3    pravastatin (PRAVACHOL) 20 MG tablet Take 1 tablet by mouth nightly 90 tablet 3    ALPRAZolam (XANAX) 0.5 MG tablet Take 0.5 mg by mouth nightly as needed for Sleep.      loratadine (CLARITIN) 10 MG tablet Take 10 mg by mouth daily      Multiple Vitamins-Minerals (THERAPEUTIC MULTIVITAMIN-MINERALS) tablet Take 1 tablet by mouth daily      vitamin C (ASCORBIC ACID) 500 MG tablet Take 500 mg by mouth daily      Cholecalciferol (VITAMIN D3) 125 MCG (5000 UT) TABS Take 5,000 Units by mouth daily       cyanocobalamin ([employer] VITAMIN B12) 1000 MCG tablet Take 1 tablet by mouth daily 30 tablet 3    aspirin 81 MG tablet Take 81 mg by mouth daily.          ALLERGIES    Allergies   Allergen Reactions    Montelukast Sodium Shortness Of Breath    Sulfa Antibiotics Shortness Of Breath    Valium [Diazepam] Itching and Other (See Comments)     Nightmares      Strawberry Extract Rash       FAMILY AND SOCIAL HISTORY    Family History   Problem Relation Age of Onset    Hypertension Mother     Colon Cancer Father     Diabetes Sister      Social History     Socioeconomic History    Marital status:     Number of children: 3   Tobacco Use    Smoking status: Former     Packs/day: 0.25     Years: 3.00     Pack years: 0.75     Types: Cigarettes     Start date: 10/21/1961     Quit date: 10/21/1964     Years since quittin.1    Smokeless tobacco: Never   Vaping Use    Vaping Use: Never used   Substance and Sexual Activity    Alcohol use: Yes     Comment: SOCIALLY-wine    Drug use: No    Sexual activity: Never     Social Determinants of Health     Financial Resource Strain: Low Risk     Difficulty of Paying Living Expenses: Not hard at all   Food Insecurity: No Food Insecurity    Worried About Running Out of Food in the Last Year: Never true    Ran Out of Food in the Last Year: Never true   Physical Activity: Insufficiently Active    Days of Exercise per Week: 2 days    Minutes of Exercise per Session: 30 min       PHYSICAL EXAM    VITAL SIGNS: BP (!) 159/87   Pulse 68   Temp 98.4 °F (36.9 °C) (Oral)   Resp 20   Wt 213 lb 3 oz (96.7 kg)   SpO2 96%   BMI 40.28 kg/m²   Constitutional:  Well developed, well nourished, no acute distress  Eyes: Sclera nonicteric, conjunctiva normal   Ears:  ear canal appears nonerythematous and the ipsilateral TM appears grey and nonbulging, the mastoid and pinna are without redness or swelling   Throat/Face:  nonerythematous throat, no exudates, no trismus  Neck: Supple, no enlarged tonsillar LAD  Respiratory: Lungs with diffuse rhonchi and left lower lung base wheezes. No rales or retractions. Cardiovascular:  Regular rate and rhythm, no murmurs, rubs, or gallops.   Musculoskeletal: Trace edema   Neurologic: Awake alert and oriented, and no slurred speech  Integument: Skin is warm and dry, no rash    RADIOLOGY/PROCEDURES    XR CHEST (2 VW)   Final Result   Mild left basilar atelectasis. Otherwise, unremarkable exam             ED COURSE & MEDICAL DECISION MAKING    See chart for details of medications given during the ED stay. Differential diagnoses: Airway Obstruction, Epiglottitis, Retropharyngeal Abscess, Parapharyngeal Abscess, Pneumonia, Hypoxemia, Dehydration, other. Patient presents to the emergency department status post she was seen at her PCPs office earlier today with a 5-day history of nasal and chest congestion, lightheadedness cough, change in ability to taste, and body \"aches\" rated severity of 4/10. She endorsed she started with shortness of breath today. Patient states that she was also seen in her primary care doctor's office on 12/9, diagnosed with URI, acute cough, and mild intermittent asthma without complication and was prescribed Tessalon, cefdinir, and promethazine DM-by NORMA Heaton. She states she only took the cough medicine. Denies chest pain, nausea, vomiting, dizziness, presyncope, fevers, sweats, hemoptysis, leg/calf pain, new swelling in her lower extremities-history of lymphedema, headache, abdominal pain, diarrhea, urinary symptoms/retention, other concerns. Prior to my arrival in department orders have been placed for CBC, CMP, COVID-19, influenza, and EKG, and CXR. I did add BNP and troponin work-up pending. Patient has been medicated with DuoNeb x1. Work-up pending.       Labs reviewed:  I have reviewed and interpreted all of the currently available lab results from this visit:  Results for orders placed or performed during the hospital encounter of 12/13/22   COVID-19, Rapid    Specimen: Nasopharyngeal Swab   Result Value Ref Range    SARS-CoV-2, NAAT Not Detected Not Detected   Rapid influenza A/B antigens    Specimen: Nasopharyngeal   Result Value Ref Range    Rapid Influenza A Ag Negative Negative    Rapid Influenza B Ag Negative Negative   CBC with Auto Differential   Result Value Ref Range    WBC 3.6 (L) 4.0 - 11.0 K/uL    RBC 4.52 4.00 - 5.20 M/uL    Hemoglobin 12.9 12.0 - 16.0 g/dL    Hematocrit 39.3 36.0 - 48.0 %    MCV 86.9 80.0 - 100.0 fL    MCH 28.6 26.0 - 34.0 pg    MCHC 32.9 31.0 - 36.0 g/dL    RDW 14.2 12.4 - 15.4 %    Platelets 717 (L) 956 - 450 K/uL    MPV 7.3 5.0 - 10.5 fL    Neutrophils % 56.5 %    Lymphocytes % 28.9 %    Monocytes % 12.8 %    Eosinophils % 1.3 %    Basophils % 0.5 %    Neutrophils Absolute 2.0 1.7 - 7.7 K/uL    Lymphocytes Absolute 1.0 1.0 - 5.1 K/uL    Monocytes Absolute 0.5 0.0 - 1.3 K/uL    Eosinophils Absolute 0.0 0.0 - 0.6 K/uL    Basophils Absolute 0.0 0.0 - 0.2 K/uL   Comprehensive Metabolic Panel w/ Reflex to MG   Result Value Ref Range    Sodium 135 (L) 136 - 145 mmol/L    Potassium reflex Magnesium 3.6 3.5 - 5.1 mmol/L    Chloride 94 (L) 99 - 110 mmol/L    CO2 31 21 - 32 mmol/L    Anion Gap 10 3 - 16    Glucose 101 (H) 70 - 99 mg/dL    BUN 14 7 - 20 mg/dL    Creatinine 0.6 0.6 - 1.2 mg/dL    Est, Glom Filt Rate >60 >60    Calcium 9.5 8.3 - 10.6 mg/dL    Total Protein 6.6 6.4 - 8.2 g/dL    Albumin 3.5 3.4 - 5.0 g/dL    Albumin/Globulin Ratio 1.1 1.1 - 2.2    Total Bilirubin <0.2 0.0 - 1.0 mg/dL    Alkaline Phosphatase 119 40 - 129 U/L    ALT 17 10 - 40 U/L    AST 22 15 - 37 U/L   Brain Natriuretic Peptide   Result Value Ref Range    Pro-BNP 93 0 - 449 pg/mL   Troponin   Result Value Ref Range    Troponin <0.01 <0.01 ng/mL   EKG 12 Lead   Result Value Ref Range    Ventricular Rate 71 BPM    Atrial Rate 71 BPM    P-R Interval 174 ms    QRS Duration 100 ms    Q-T Interval 420 ms    QTc Calculation (Bazett) 456 ms    P Axis 73 degrees    R Axis 3 degrees    T Axis 4 degrees    Diagnosis       Sinus rhythm with Premature supraventricular complexesBaseline artifactModerate voltage criteria for LVH, may be normal variant ( R in aVL , Jackson product )Nonspecific ST and T wave abnormalityAbnormal ECGWhen compared with ECG of 13-AUG-2021 21:25,Poor data quality ekgs, butNo significant change evidentConfirmed by [facility] AT [facility] MD, Milla Guaman ([id number]) on 12/13/2022 1:31:36 PM           Imaging reviewed:  XR CHEST (2 VW)    Result Date: 12/13/2022  Mild left basilar atelectasis. Otherwise, unremarkable exam        Work-up reveals:  Leukopenia at 3.6 with platelets reduced at 130 1K but otherwise unremarkable  Metabolic panel: Mild electrolyte derangement with a sodium of 135, chloride 94, Hyperglycemic at 101 mg/dL without elevation in LFTs or reduction in renal function  COVID-19: Not detected  Rapid influenza: Negative  BNP: [email or web address]  Troponin: <0.01  EKG: As interpreted by EMD  CXR: As noted above within defined mild left basilar atelectasis. Otherwise unremarkable exam        Patient is afebrile with no evidence of stridor, drooling, posturing or respiratory distress. Patient saturating at % on room air. Therefore, have low suspicion for the presence of emergent medical condition at this time but the patient pulsated appropriate discharged home as the risk of additional laboratory testing, imaging, and/or admission outweighs any potential benefit at this time. However, the patient was given strict return precautions. Patient verbalized understands agreeable to plan for discharge and follow-up. Patient instructed to continue her previously prescribed medications. I instructed the patient to follow up as an outpatient in 1-2 days. I instructed the patient to return to the ED immediately for any new or worsening symptoms. The patient verbalizes understanding. FINAL IMPRESSION      ICD-10-CM    1. Viral illness  B34.9       2. Mild intermittent asthma without complication  S97.72 DISCONTINUED: albuterol sulfate HFA (PROVENTIL;VENTOLIN;PROAIR) 108 (90 Base) MCG/ACT inhaler      3. Lab test negative for COVID-19 virus  Z20.822       4.  Elevated blood pressure reading in office with diagnosis of hypertension  I10 PLAN  Outpatient treatment, discharge instructions, and follow-up (see EMR)    (Please note that this note was completed with a voice recognition program.  Every attempt was made to edit the dictations, but inevitably there remain words that are mis-transcribed.)       TIFFANIE Rothman - LEESA  12/15/22 [id number]

## 2022-12-13 NOTE — DISCHARGE INSTRUCTIONS
Return to the ED for new or worsening symptoms including, but not limited to, developing chest pain, severe shortness of breath, blue lips, passing out, feeling as if you are going to pass out, sweats, or other symptoms/concerns. Continue previously prescribed medications. Inhaler as prescribed. Followup with your PCP for re-evaluation in 2-3 days. Get plenty of rest.    Ensure that you are adequately hydrating by drinking at least 64 ounces of water daily unless you are on fluid restrictions.   If you are restrictions please adhere to those limits

## 2022-12-14 ENCOUNTER — CARE COORDINATION (OUTPATIENT)
Dept: CARE COORDINATION | Age: 77
End: 2022-12-14

## 2022-12-15 ENCOUNTER — CARE COORDINATION (OUTPATIENT)
Dept: CARE COORDINATION | Age: 77
End: 2022-12-15

## 2022-12-15 ASSESSMENT — ENCOUNTER SYMPTOMS
ABDOMINAL PAIN: 0
NAUSEA: 0
SHORTNESS OF BREATH: 1
SORE THROAT: 0
BACK PAIN: 0
ANAL BLEEDING: 0
COUGH: 1
VOMITING: 0
EYE PAIN: 0

## 2022-12-15 NOTE — CARE COORDINATION
Call to patient as follow up from ER visit, NO answer, MICAH w KEVIN to please contact office with any questions/ concerns

## 2023-01-24 ENCOUNTER — OFFICE VISIT (OUTPATIENT)
Dept: VASCULAR SURGERY | Age: 78
End: 2023-01-24
Payer: MEDICARE

## 2023-01-24 VITALS — SYSTOLIC BLOOD PRESSURE: 152 MMHG | DIASTOLIC BLOOD PRESSURE: 80 MMHG | WEIGHT: 213 LBS | BODY MASS INDEX: 40.25 KG/M2

## 2023-01-24 DIAGNOSIS — M79.604 PAIN IN BOTH LOWER EXTREMITIES: ICD-10-CM

## 2023-01-24 DIAGNOSIS — E78.2 MIXED HYPERLIPIDEMIA: ICD-10-CM

## 2023-01-24 DIAGNOSIS — I89.0 LYMPHEDEMA OF BOTH LOWER EXTREMITIES: Primary | ICD-10-CM

## 2023-01-24 DIAGNOSIS — I10 ESSENTIAL HYPERTENSION, BENIGN: ICD-10-CM

## 2023-01-24 DIAGNOSIS — M79.89 LEG SWELLING: ICD-10-CM

## 2023-01-24 DIAGNOSIS — M79.605 PAIN IN BOTH LOWER EXTREMITIES: ICD-10-CM

## 2023-01-24 PROBLEM — M79.606 LEG PAIN: Status: ACTIVE | Noted: 2023-01-24

## 2023-01-24 PROCEDURE — 1123F ACP DISCUSS/DSCN MKR DOCD: CPT | Performed by: SURGERY

## 2023-01-24 PROCEDURE — 3079F DIAST BP 80-89 MM HG: CPT | Performed by: SURGERY

## 2023-01-24 PROCEDURE — G8399 PT W/DXA RESULTS DOCUMENT: HCPCS | Performed by: SURGERY

## 2023-01-24 PROCEDURE — 1090F PRES/ABSN URINE INCON ASSESS: CPT | Performed by: SURGERY

## 2023-01-24 PROCEDURE — G8484 FLU IMMUNIZE NO ADMIN: HCPCS | Performed by: SURGERY

## 2023-01-24 PROCEDURE — 1036F TOBACCO NON-USER: CPT | Performed by: SURGERY

## 2023-01-24 PROCEDURE — 99214 OFFICE O/P EST MOD 30 MIN: CPT | Performed by: SURGERY

## 2023-01-24 PROCEDURE — 3077F SYST BP >= 140 MM HG: CPT | Performed by: SURGERY

## 2023-01-24 PROCEDURE — G8427 DOCREV CUR MEDS BY ELIG CLIN: HCPCS | Performed by: SURGERY

## 2023-01-24 PROCEDURE — G8417 CALC BMI ABV UP PARAM F/U: HCPCS | Performed by: SURGERY

## 2023-01-24 NOTE — PROGRESS NOTES
Daily Progress Note   Marielos Younger MD      1/24/2023    Chief Complaint   Patient presents with    6 Month Follow-Up     6 month lymphedema and swelling. Pt states legs have no improved, bilateral leg pain x 2 months, increased swelling, trouble sleeping at night. HISTORY OF PRESENT ILLNESS:                The patient is a 65 y.o. female who presents with a six month follow up for lymphedema and swelling. Mrs. Sultana had been wearing compression stockings daily, 30-40 mmHg. She also uses a lymphapress at least two hours a day, sometimes three. She says the lymphapress really helps her legs: they both look and feel better. Mrs. Lorie Patrick also says her legs hurt her at night, mainly her lower legs, and both front and back. She has hyperlipidemia and hypertension treated and followed by Grisel Chau. She does not smoke. She says she was told recently (September) that she had a hernia. She has been told this previously as well, but when Dr. Ronda Cabot looked at her previous scan it appeared to be a diastasis, not a hernia.        Past Medical History:   Diagnosis Date    Asthma     BMI 40.0-44.9, adult ([facility] 7/21/2020    Diverticulitis     Graves disease     Hyperlipidemia     Hypertension     Hyperthyroidism     Hypothyroidism     Lymphedema of both lower extremities 2/7/2020    Mild persistent asthma without complication     Osteoarthritis     Peptic ulcer, unspecified site, unspecified as acute or chronic, without mention of hemorrhage or perforation     Peripheral vascular disease ([facility] 3/15/2021    Prolonged emergence from general anesthesia     Sleep apnea     CPAP at night    Thrombocytopenia, unspecified 8/26/2021    Venous insufficiency     Wears glasses        Past Surgical History:   Procedure Laterality Date    BLEPHAROPLASTY Left     CHOLECYSTECTOMY  2001    COLONOSCOPY  2011    normal-daniel    COLONOSCOPY N/A 9/20/2022    COLONOSCOPY POLYPECTOMY SNARE/COLD BIOPSY performed by Ankush Tom MD at [address] Left 2021    eye    FOOT SURGERY Left     spurs removed from left heel    HYSTERECTOMY (CERVIX STATUS UNKNOWN)      JOINT REPLACEMENT Left     TKR    KNEE ARTHROSCOPY Left     ROTATOR CUFF REPAIR Right        Social History     Socioeconomic History    Marital status:      Spouse name: Not on file    Number of children: 3    Years of education: Not on file    Highest education level: Not on file   Occupational History    Not on file   Tobacco Use    Smoking status: Former     Packs/day: 0.25     Years: 3.00     Pack years: 0.75     Types: Cigarettes     Start date: 10/21/1961     Quit date: 10/21/1964     Years since quittin.2    Smokeless tobacco: Never   Vaping Use    Vaping Use: Never used   Substance and Sexual Activity    Alcohol use: Yes     Comment: SOCIALLY-wine    Drug use: No    Sexual activity: Never   Other Topics Concern    Not on file   Social History Narrative    Not on file     Social Determinants of Health     Financial Resource Strain: Low Risk     Difficulty of Paying Living Expenses: Not hard at all   Food Insecurity: No Food Insecurity    Worried About Running Out of Food in the Last Year: Never true    Ran Out of Food in the Last Year: Never true   Transportation Needs: Not on file   Physical Activity: Insufficiently Active    Days of Exercise per Week: 2 days    Minutes of Exercise per Session: 30 min   Stress: Not on file   Social Connections: Not on file   Intimate Partner Violence: Not on file   Housing Stability: Not on file       Family History   Problem Relation Age of Onset    Hypertension Mother     Colon Cancer Father     Diabetes Sister          Current Outpatient Medications:     ALPRAZolam (XANAX) 0.5 MG tablet, Take 1 tablet by mouth nightly as needed for Sleep for up to 60 days.  Max Daily Amount: 0.5 mg, Disp: 30 tablet, Rfl: 1    hydroCHLOROthiazide (HYDRODIURIL) 25 MG tablet, Take 1 tablet by mouth every morning, Disp: 90 tablet, Rfl: 1    lisinopril (PRINIVIL;ZESTRIL) 20 MG tablet, TAKE 1 TABLET BY MOUTH DAILY, Disp: 90 tablet, Rfl: 1    dicyclomine (BENTYL) 20 MG tablet, Take 20 mg by mouth in the morning and 20 mg at noon and 20 mg in the evening and 20 mg before bedtime. Uses as needed. , Disp: , Rfl:     amLODIPine (NORVASC) 5 MG tablet, TAKE 1 TABLET BY MOUTH DAILY (Patient taking differently: Take 5 mg by mouth 2 times daily), Disp: 90 tablet, Rfl: 3    nebivolol (BYSTOLIC) 10 MG tablet, TAKE 1 TABLET BY MOUTH DAILY, Disp: 90 tablet, Rfl: 3    pantoprazole (PROTONIX) 40 MG tablet, Take 1 tablet by mouth every morning (before breakfast), Disp: 30 tablet, Rfl: 0    levothyroxine (SYNTHROID) 125 MCG tablet, TAKE 1 TABLET BY MOUTH DAILY, Disp: 90 tablet, Rfl: 3    magnesium (MAGNESIUM-OXIDE) 250 MG TABS tablet, Take 750 mg by mouth daily, Disp: , Rfl:     ezetimibe (ZETIA) 10 MG tablet, TAKE 1 TABLET DAILY, Disp: 90 tablet, Rfl: 3    pravastatin (PRAVACHOL) 20 MG tablet, Take 1 tablet by mouth nightly, Disp: 90 tablet, Rfl: 3    loratadine (CLARITIN) 10 MG tablet, Take 10 mg by mouth daily, Disp: , Rfl:     Multiple Vitamins-Minerals (THERAPEUTIC MULTIVITAMIN-MINERALS) tablet, Take 1 tablet by mouth daily, Disp: , Rfl:     vitamin C (ASCORBIC ACID) 500 MG tablet, Take 500 mg by mouth daily, Disp: , Rfl:     Cholecalciferol (VITAMIN D3) 125 MCG (5000 UT) TABS, Take 5,000 Units by mouth daily , Disp: , Rfl:     cyanocobalamin ([employer] VITAMIN B12) 1000 MCG tablet, Take 1 tablet by mouth daily, Disp: 30 tablet, Rfl: 3    aspirin 81 MG tablet, Take 81 mg by mouth daily. , Disp: , Rfl:     Montelukast sodium, Sulfa antibiotics, Valium [diazepam], Strawberry, and Strawberry extract    Vitals:    01/24/23 1407   BP: (!) 152/80   Weight: 213 lb (96.6 kg)       Orders Only on 01/11/2023   Component Date Value Ref Range Status    Sodium 01/11/2023 143  136 - 145 mmol/L Final    Potassium 01/11/2023 4.0  3.5 - 5.1 mmol/L Final    Chloride 01/11/2023 106  99 - 110 mmol/L Final    CO2 01/11/2023 26  21 - 32 mmol/L Final    Anion Gap 01/11/2023 11  3 - 16 Final    Glucose 01/11/2023 101 (A)  70 - 99 mg/dL Final    BUN 01/11/2023 21 (A)  7 - 20 mg/dL Final    Creatinine 01/11/2023 0.7  0.6 - 1.2 mg/dL Final    Est, Glom Filt Rate 01/11/2023 >60  >60 Final    Comment: Pediatric calculator link  T[employer].[email or web address] org/professionals/kdoqi/gfr_calculatorped  Effective Oct 3, 2022  These results are not intended for use in patients  <25years of age. eGFR results are calculated without  a race factor using the 2021 CKD-EPI equation. Careful  clinical correlation is recommended, particularly when  comparing to results calculated using previous equations. The CKD-EPI equation is less accurate in patients with  extremes of muscle mass, extra-renal metabolism of  creatinine, excessive creatinine ingestion, or following  therapy that affects renal tubular secretion. Calcium 01/11/2023 9.6  8.3 - 10.6 mg/dL Final    Total Protein 01/11/2023 6.4  6.4 - 8.2 g/dL Final    Albumin 01/11/2023 3.9  3.4 - 5.0 g/dL Final    Albumin/Globulin Ratio 01/11/2023 1.6  1.1 - 2.2 Final    Total Bilirubin 01/11/2023 <0.2  0.0 - 1.0 mg/dL Final    Alkaline Phosphatase 01/11/2023 113  40 - 129 U/L Final    ALT 01/11/2023 12  10 - 40 U/L Final    AST 01/11/2023 10 (A)  15 - 37 U/L Final    Hemoglobin A1C 01/11/2023 5.4  See comment % Final    Comment: Comment:  Diagnosis of Diabetes: > or = 6.5%  Increased risk of diabetes (Prediabetes): 5.7-6.4%  Glycemic Control: Nonpregnant Adults: <7.0%                    Pregnant: <6.0%        eAG 01/11/2023 108.3  mg/dL Final    Cholesterol, Total 01/11/2023 152  0 - 199 mg/dL Final    Triglycerides 01/11/2023 139  0 - 150 mg/dL Final    HDL 01/11/2023 51  40 - 60 mg/dL Final    Comment: An HDL cholesterol less than 40 mg/dL is low and  constitutes a coronary heart disease risk factor.   An HDL cholesterol greater than 60 mg/dL is a  negative risk factor for coronary heart disease. LDL Calculated 01/11/2023 73  <100 mg/dL Final    VLDL Cholesterol Calculated 01/11/2023 28  Not Established mg/dL Final    TSH 01/11/2023 0.30  0.27 - 4.20 uIU/mL Final       Review of Systems   Constitutional:  Positive for fatigue. Negative for activity change, appetite change, chills, diaphoresis, fever and unexpected weight change. HENT: Negative. Eyes: Negative. Respiratory: Negative. Cardiovascular:  Positive for leg swelling. Negative for chest pain and palpitations. Gastrointestinal: Negative. Endocrine: Negative. Genitourinary: Negative. Musculoskeletal: Negative. Skin: Negative. Allergic/Immunologic: Negative. Neurological: Negative. Hematological: Negative. Psychiatric/Behavioral: Negative. All other systems reviewed and are negative. Physical Exam  Vitals (Obese) and nursing note reviewed. Constitutional:       General: She is not in acute distress. Appearance: Normal appearance. She is well-developed. She is obese. She is not ill-appearing or toxic-appearing. HENT:      Head: Normocephalic and atraumatic. Right Ear: External ear normal.      Mouth/Throat:      Pharynx: No oropharyngeal exudate. Eyes:      General: No scleral icterus. Conjunctiva/sclera: Conjunctivae normal.      Pupils: Pupils are equal, round, and reactive to light. Neck:      Thyroid: No thyromegaly. Vascular: Normal carotid pulses. No carotid bruit or JVD. Trachea: No tracheal deviation. Cardiovascular:      Rate and Rhythm: Normal rate and regular rhythm. Pulses:           Carotid pulses are 2+ on the right side and 2+ on the left side. Radial pulses are 2+ on the right side and 2+ on the left side. Femoral pulses are 2+ on the right side and 2+ on the left side. Popliteal pulses are 1+ on the right side and 2+ on the left side.         Dorsalis pedis pulses are 1+ on the right side and 2+ on the left side. Posterior tibial pulses are 2+ on the right side and 2+ on the left side. Heart sounds: Normal heart sounds and S1 normal. No murmur heard. Comments: MEASUREMENTS 1/24/23:    RIGHT ANKLE: 24.0 cm   RIGHT CALF: 47.4 cm     LEFT ANKLE: 24.0 cm   LEFT CALF: 49.8 cm             7/19/2022  MEASUREMENTS:    RIGHT ANKLE: 23.3 cm   RIGHT CALF: 47.5 cm     LEFT ANKLE:  24.0 cm   LEFT CALF:  50.0 cm       1/18/2022:     RIGHT ANKLE: 23.6 cm   RIGHT CALF: 46.7 cm     LEFT ANKLE: 24.0 cm   LEFT CALF: 48.7 cm     MEASUREMENTS 7/13/21:    RIGHT ANKLE: 23.7 cm  RIGHT CALF: 47.3 cm     LEFT ANKLE: 23.5 cm   LEFT CALF: 48.5 cm     MEASUREMENTS 1/12/2021:    RIGHT ANKLE: 23.0 cm   RIGHT CALF: 46.5 cm     LEFT ANKLE: 23.4 cm   LEFT CALF: 47.3 cm     MEASUREMENTS 7/7/2020:    RIGHT ANKLE: 22.9 cm  RIGHT CALF: 47.2 cm     LEFT ANKLE: 23.5 cm   LEFT CALF: 48.2 cm     MEASUREMENTS 2/7/2020:    RIGHT ANKLE: 22.8 cm  RIGHT CALF: 45.6 cm     LEFT ANKLE:  23.4 cm   LEFT CALF: 45.3 cm       MEASUREMENTS 1/3/2020:    RIGHT ANKLE: 22.8 cm   RIGHT CALF: 46.4 cm     LEFT ANKLE: 23.3 cm   LEFT CALF: 47.7 cm     No abdominal bruits    Pulmonary:      Effort: Pulmonary effort is normal. No tachypnea, accessory muscle usage or respiratory distress. Breath sounds: Normal breath sounds. No stridor. No wheezing or rales. Abdominal:      General: Bowel sounds are normal. There is no distension. Palpations: Abdomen is soft. There is no mass. Tenderness: There is no abdominal tenderness. There is no guarding or rebound. Hernia: No hernia is present. There is no hernia in the ventral area or left inguinal area. Genitourinary:     Comments: Rectal exam/stool guaiac not indicated. Musculoskeletal:         General: No tenderness. Normal range of motion. Right shoulder: No deformity. Normal range of motion. Cervical back: Normal range of motion and neck supple.  No edema or erythema.      Right lower le+ Pitting Edema present.      Left lower le+ Pitting Edema present.        Legs:    Lymphadenopathy:      Head:      Right side of head: No submandibular, preauricular, posterior auricular or occipital adenopathy.      Left side of head: No submandibular, preauricular, posterior auricular or occipital adenopathy.      Cervical: No cervical adenopathy.      Right cervical: No superficial, deep or posterior cervical adenopathy.     Left cervical: No superficial, deep or posterior cervical adenopathy.      Upper Body:      Right upper body: No supraclavicular or pectoral adenopathy.      Left upper body: No supraclavicular or pectoral adenopathy.   Skin:     General: Skin is warm and dry.      Coloration: Skin is not pale.      Findings: No bruising, erythema, laceration, lesion or rash.   Neurological:      Mental Status: She is alert and oriented to person, place, and time.      Cranial Nerves: No cranial nerve deficit.      Sensory: No sensory deficit.      Motor: No atrophy or abnormal muscle tone.      Coordination: Coordination normal.      Gait: Gait normal.      Deep Tendon Reflexes: Reflexes are normal and symmetric.   Psychiatric:         Speech: Speech normal.         Behavior: Behavior normal.         Thought Content: Thought content normal.         Judgment: Judgment normal.   Mrs. Sultana had had a left knee replacement. She has an history of diverticulitis.     ASSESSMENT:    Problem List Items Addressed This Visit          Medium    Leg pain       Unprioritized    Hyperlipemia    Essential hypertension, benign    Lymphedema of both lower extremities - Primary     Other Visit Diagnoses       Leg swelling                PLAN:  She said she has been trying her Lymphapress up to 3 hours in 1 setting I explained that dividing it up to morning midday and evening for an hour each would be more effective.  She is very good at wearing her stockings and encouraged her to keep  doing this. She does have reddish discoloration of both lower legs this is where her cellulitis was she been told that this is a permanent color change. Went through reasons for leg pain certainly not her arteries she did not break any bones or twist her joints. She is wondering if her stockings are wearing out and I agree she should get new ones. She does not want to switch to the Velcro system. She is not having rest pain but she does have pain at night that keeps her awake sometimes  Schedule to return in six months for lymphedema and leg swelling. Gave her a prescription for 30 to 40 mmHg knee-high stocking I do not think she be able to tolerate a 40-50 as far as getting them on and off etc.    I Tree Lane MA am scribing for and in the presence of Carmen Hendricks MD on this date of 01/24/23    I Hannah Kennedy MD personally performed the services described in this documentation as scribed by the Medical Assistant Tree Lane in my presence and it is both accurate and complete.       Electronically signed by Carmen Hendricks MD on 1/24/2023 at 4:22 PM

## 2023-01-30 NOTE — TELEPHONE ENCOUNTER
Last OV: 11/11/22  Next OV: not scheduled. Requested to return in 8 months.   Last refill: 2/7/22  Most recent Labs: CMP & Lipid panel 1/11/23  Last EKG (if needed):

## 2023-01-31 RX ORDER — PRAVASTATIN SODIUM 20 MG
TABLET ORAL
Qty: 90 TABLET | Refills: 3 | Status: SHIPPED | OUTPATIENT
Start: 2023-01-31

## 2023-02-09 ENCOUNTER — HOSPITAL ENCOUNTER (OUTPATIENT)
Dept: ULTRASOUND IMAGING | Age: 78
Discharge: HOME | End: 2023-02-09
Payer: MEDICARE

## 2023-02-09 DIAGNOSIS — N28.89 OTHER SPECIFIED DISORDERS OF KIDNEY AND URETER: ICD-10-CM

## 2023-02-09 PROCEDURE — 76770 US EXAM ABDO BACK WALL COMP: CPT

## 2023-02-13 ENCOUNTER — TELEPHONE (OUTPATIENT)
Dept: VASCULAR SURGERY | Age: 78
End: 2023-02-13

## 2023-02-13 NOTE — TELEPHONE ENCOUNTER
Carina Yolie wanted a letter to get a handicap placard. Letter is completed and at the  for her to . Patient was notified.

## 2023-03-15 DIAGNOSIS — I10 ESSENTIAL HYPERTENSION, BENIGN: ICD-10-CM

## 2023-03-17 RX ORDER — HYDROCHLOROTHIAZIDE 25 MG/1
25 TABLET ORAL EVERY MORNING
Qty: 90 TABLET | Refills: 1 | OUTPATIENT
Start: 2023-03-17

## 2023-04-24 ENCOUNTER — HOSPITAL ENCOUNTER (OUTPATIENT)
Dept: WOMENS IMAGING | Age: 78
Discharge: HOME | End: 2023-04-24
Payer: MEDICARE

## 2023-04-24 DIAGNOSIS — Z12.31 VISIT FOR SCREENING MAMMOGRAM: ICD-10-CM

## 2023-04-24 PROCEDURE — 77063 BREAST TOMOSYNTHESIS BI: CPT

## 2023-06-29 ENCOUNTER — OFFICE VISIT (OUTPATIENT)
Dept: CARDIOLOGY CLINIC | Age: 78
End: 2023-06-29
Payer: MEDICARE

## 2023-06-29 VITALS
DIASTOLIC BLOOD PRESSURE: 65 MMHG | HEIGHT: 61 IN | BODY MASS INDEX: 41.54 KG/M2 | WEIGHT: 220 LBS | HEART RATE: 85 BPM | SYSTOLIC BLOOD PRESSURE: 130 MMHG | OXYGEN SATURATION: 96 %

## 2023-06-29 DIAGNOSIS — I63.9 CEREBROVASCULAR ACCIDENT (CVA), UNSPECIFIED MECHANISM (HCC): ICD-10-CM

## 2023-06-29 DIAGNOSIS — I10 ESSENTIAL HYPERTENSION: ICD-10-CM

## 2023-06-29 DIAGNOSIS — R53.83 OTHER FATIGUE: ICD-10-CM

## 2023-06-29 DIAGNOSIS — Q21.12 PFO (PATENT FORAMEN OVALE): Primary | ICD-10-CM

## 2023-06-29 DIAGNOSIS — I89.0 LYMPHEDEMA OF BOTH LOWER EXTREMITIES: ICD-10-CM

## 2023-06-29 PROCEDURE — 1090F PRES/ABSN URINE INCON ASSESS: CPT | Performed by: NURSE PRACTITIONER

## 2023-06-29 PROCEDURE — G8427 DOCREV CUR MEDS BY ELIG CLIN: HCPCS | Performed by: NURSE PRACTITIONER

## 2023-06-29 PROCEDURE — 3078F DIAST BP <80 MM HG: CPT | Performed by: NURSE PRACTITIONER

## 2023-06-29 PROCEDURE — 99213 OFFICE O/P EST LOW 20 MIN: CPT | Performed by: NURSE PRACTITIONER

## 2023-06-29 PROCEDURE — G8417 CALC BMI ABV UP PARAM F/U: HCPCS | Performed by: NURSE PRACTITIONER

## 2023-06-29 PROCEDURE — 1123F ACP DISCUSS/DSCN MKR DOCD: CPT | Performed by: NURSE PRACTITIONER

## 2023-06-29 PROCEDURE — 3075F SYST BP GE 130 - 139MM HG: CPT | Performed by: NURSE PRACTITIONER

## 2023-06-29 PROCEDURE — 1036F TOBACCO NON-USER: CPT | Performed by: NURSE PRACTITIONER

## 2023-06-29 PROCEDURE — G8399 PT W/DXA RESULTS DOCUMENT: HCPCS | Performed by: NURSE PRACTITIONER

## 2023-07-25 ENCOUNTER — OFFICE VISIT (OUTPATIENT)
Dept: VASCULAR SURGERY | Age: 78
End: 2023-07-25
Payer: MEDICARE

## 2023-07-25 VITALS — HEIGHT: 61 IN | BODY MASS INDEX: 41.57 KG/M2

## 2023-07-25 DIAGNOSIS — E78.2 MIXED HYPERLIPIDEMIA: ICD-10-CM

## 2023-07-25 DIAGNOSIS — M62.08 DIASTASIS RECTI: Primary | ICD-10-CM

## 2023-07-25 DIAGNOSIS — I10 ESSENTIAL HYPERTENSION: ICD-10-CM

## 2023-07-25 DIAGNOSIS — I89.0 LYMPHEDEMA OF BOTH LOWER EXTREMITIES: ICD-10-CM

## 2023-07-25 PROCEDURE — 1036F TOBACCO NON-USER: CPT | Performed by: SURGERY

## 2023-07-25 PROCEDURE — G8399 PT W/DXA RESULTS DOCUMENT: HCPCS | Performed by: SURGERY

## 2023-07-25 PROCEDURE — 1123F ACP DISCUSS/DSCN MKR DOCD: CPT | Performed by: SURGERY

## 2023-07-25 PROCEDURE — 1090F PRES/ABSN URINE INCON ASSESS: CPT | Performed by: SURGERY

## 2023-07-25 PROCEDURE — G8417 CALC BMI ABV UP PARAM F/U: HCPCS | Performed by: SURGERY

## 2023-07-25 PROCEDURE — G8427 DOCREV CUR MEDS BY ELIG CLIN: HCPCS | Performed by: SURGERY

## 2023-07-25 PROCEDURE — 99214 OFFICE O/P EST MOD 30 MIN: CPT | Performed by: SURGERY

## 2023-07-25 NOTE — PROGRESS NOTES
fix.      PLAN:    Schedule to return in six months for lymphedema. Crescencio López MA am scribing for and in the presence of Prisca Grider MD on this date of 07/25/23    I Rajinder Garsia MD personally performed the services described in this documentation as scribed by the Medical Assistant Telma Lane in my presence and it is both accurate and complete.       Electronically signed by Prisca Grider MD on 7/25/2023 at 10:33 AM

## 2023-08-31 ENCOUNTER — OFFICE VISIT (OUTPATIENT)
Dept: PULMONOLOGY | Age: 78
End: 2023-08-31
Payer: MEDICARE

## 2023-08-31 VITALS
HEART RATE: 62 BPM | HEIGHT: 62 IN | WEIGHT: 224.4 LBS | SYSTOLIC BLOOD PRESSURE: 120 MMHG | DIASTOLIC BLOOD PRESSURE: 78 MMHG | BODY MASS INDEX: 41.3 KG/M2 | OXYGEN SATURATION: 94 % | RESPIRATION RATE: 18 BRPM | TEMPERATURE: 97.8 F

## 2023-08-31 DIAGNOSIS — G47.33 OSA (OBSTRUCTIVE SLEEP APNEA): ICD-10-CM

## 2023-08-31 DIAGNOSIS — J45.30 MILD PERSISTENT ASTHMA WITHOUT COMPLICATION: ICD-10-CM

## 2023-08-31 PROCEDURE — G8417 CALC BMI ABV UP PARAM F/U: HCPCS | Performed by: INTERNAL MEDICINE

## 2023-08-31 PROCEDURE — 1036F TOBACCO NON-USER: CPT | Performed by: INTERNAL MEDICINE

## 2023-08-31 PROCEDURE — 3078F DIAST BP <80 MM HG: CPT | Performed by: INTERNAL MEDICINE

## 2023-08-31 PROCEDURE — 99214 OFFICE O/P EST MOD 30 MIN: CPT | Performed by: INTERNAL MEDICINE

## 2023-08-31 PROCEDURE — 1090F PRES/ABSN URINE INCON ASSESS: CPT | Performed by: INTERNAL MEDICINE

## 2023-08-31 PROCEDURE — 1123F ACP DISCUSS/DSCN MKR DOCD: CPT | Performed by: INTERNAL MEDICINE

## 2023-08-31 PROCEDURE — G8427 DOCREV CUR MEDS BY ELIG CLIN: HCPCS | Performed by: INTERNAL MEDICINE

## 2023-08-31 PROCEDURE — G8399 PT W/DXA RESULTS DOCUMENT: HCPCS | Performed by: INTERNAL MEDICINE

## 2023-08-31 PROCEDURE — 3074F SYST BP LT 130 MM HG: CPT | Performed by: INTERNAL MEDICINE

## 2023-08-31 ASSESSMENT — SLEEP AND FATIGUE QUESTIONNAIRES
ESS TOTAL SCORE: 1
HOW LIKELY ARE YOU TO NOD OFF OR FALL ASLEEP WHILE SITTING AND READING: 0
HOW LIKELY ARE YOU TO NOD OFF OR FALL ASLEEP WHILE SITTING AND TALKING TO SOMEONE: 0
HOW LIKELY ARE YOU TO NOD OFF OR FALL ASLEEP WHILE SITTING INACTIVE IN A PUBLIC PLACE: 0
HOW LIKELY ARE YOU TO NOD OFF OR FALL ASLEEP WHILE LYING DOWN TO REST IN THE AFTERNOON WHEN CIRCUMSTANCES PERMIT: 0
HOW LIKELY ARE YOU TO NOD OFF OR FALL ASLEEP WHEN YOU ARE A PASSENGER IN A CAR FOR AN HOUR WITHOUT A BREAK: 1
HOW LIKELY ARE YOU TO NOD OFF OR FALL ASLEEP WHILE WATCHING TV: 0
HOW LIKELY ARE YOU TO NOD OFF OR FALL ASLEEP WHILE SITTING QUIETLY AFTER LUNCH WITHOUT ALCOHOL: 0
HOW LIKELY ARE YOU TO NOD OFF OR FALL ASLEEP IN A CAR, WHILE STOPPED FOR A FEW MINUTES IN TRAFFIC: 0

## 2023-09-01 ENCOUNTER — HOSPITAL ENCOUNTER (OUTPATIENT)
Dept: NON INVASIVE DIAGNOSTICS | Age: 78
Discharge: HOME | End: 2023-09-01
Payer: MEDICARE

## 2023-09-01 DIAGNOSIS — Q21.12 PFO (PATENT FORAMEN OVALE): ICD-10-CM

## 2023-09-01 LAB
LV EF: 60 %
LVEF MODALITY: NORMAL

## 2023-09-01 PROCEDURE — 93306 TTE W/DOPPLER COMPLETE: CPT

## 2023-10-17 RX ORDER — PRAVASTATIN SODIUM 20 MG
20 TABLET ORAL DAILY
Qty: 90 TABLET | Refills: 3 | Status: SHIPPED | OUTPATIENT
Start: 2023-10-17

## 2023-10-17 NOTE — TELEPHONE ENCOUNTER
Requested Prescriptions     Pending Prescriptions Disp Refills    pravastatin (PRAVACHOL) 20 MG tablet [Pharmacy Med Name: PRAVASTATIN 20MG TABLETS] 90 tablet 3     Sig: TAKE 1 TABLET BY MOUTH EVERY NIGHT          Number: 90    Refills: 3    Last Office Visit: 6/29/2023     Next Office Visit: None scheduled     Last Refill: 08/09/2023    Last Labs: 01/11/2023

## 2024-01-30 ENCOUNTER — OFFICE VISIT (OUTPATIENT)
Dept: VASCULAR SURGERY | Age: 79
End: 2024-01-30
Payer: MEDICARE

## 2024-01-30 VITALS — WEIGHT: 222 LBS | BODY MASS INDEX: 41.27 KG/M2 | DIASTOLIC BLOOD PRESSURE: 86 MMHG | SYSTOLIC BLOOD PRESSURE: 102 MMHG

## 2024-01-30 DIAGNOSIS — Z86.73 HISTORY OF ISCHEMIC STROKE: ICD-10-CM

## 2024-01-30 DIAGNOSIS — E66.01 OBESITY, CLASS III, BMI 40-49.9 (MORBID OBESITY) (HCC): ICD-10-CM

## 2024-01-30 DIAGNOSIS — I10 ESSENTIAL HYPERTENSION: ICD-10-CM

## 2024-01-30 DIAGNOSIS — G89.29 CHRONIC EPIGASTRIC PAIN: ICD-10-CM

## 2024-01-30 DIAGNOSIS — R10.13 CHRONIC EPIGASTRIC PAIN: ICD-10-CM

## 2024-01-30 DIAGNOSIS — E78.2 MIXED HYPERLIPIDEMIA: ICD-10-CM

## 2024-01-30 DIAGNOSIS — I89.0 LYMPHEDEMA OF BOTH LOWER EXTREMITIES: ICD-10-CM

## 2024-01-30 DIAGNOSIS — M62.08 DIASTASIS RECTI: Primary | ICD-10-CM

## 2024-01-30 PROCEDURE — 1123F ACP DISCUSS/DSCN MKR DOCD: CPT | Performed by: SURGERY

## 2024-01-30 PROCEDURE — G8417 CALC BMI ABV UP PARAM F/U: HCPCS | Performed by: SURGERY

## 2024-01-30 PROCEDURE — G8484 FLU IMMUNIZE NO ADMIN: HCPCS | Performed by: SURGERY

## 2024-01-30 PROCEDURE — 3074F SYST BP LT 130 MM HG: CPT | Performed by: SURGERY

## 2024-01-30 PROCEDURE — 1090F PRES/ABSN URINE INCON ASSESS: CPT | Performed by: SURGERY

## 2024-01-30 PROCEDURE — 99214 OFFICE O/P EST MOD 30 MIN: CPT | Performed by: SURGERY

## 2024-01-30 PROCEDURE — 3079F DIAST BP 80-89 MM HG: CPT | Performed by: SURGERY

## 2024-01-30 PROCEDURE — G8427 DOCREV CUR MEDS BY ELIG CLIN: HCPCS | Performed by: SURGERY

## 2024-01-30 PROCEDURE — G8399 PT W/DXA RESULTS DOCUMENT: HCPCS | Performed by: SURGERY

## 2024-01-30 PROCEDURE — 1036F TOBACCO NON-USER: CPT | Performed by: SURGERY

## 2024-01-30 ASSESSMENT — ENCOUNTER SYMPTOMS
COLOR CHANGE: 1
EYES NEGATIVE: 1
ALLERGIC/IMMUNOLOGIC NEGATIVE: 1
GASTROINTESTINAL NEGATIVE: 1
RESPIRATORY NEGATIVE: 1

## 2024-01-30 NOTE — PROGRESS NOTES
Daily Progress Note   Lorenzo Willson MD      1/30/2024    Chief Complaint   Patient presents with    6 Month Follow-Up     6 month follow up for lymphedema. Pt states she has been getting a lot of cary horses but otherwise legs are doing well, remains wearing her stockings daily as well as leg press.          HISTORY OF PRESENT ILLNESS:                The patient is a 78 y.o. female who presents with a six month follow up for lymphedema.    Mrs. Sultana says she is doing well for the most part.  She is still wearing her stockings daily, and uses her lymphapress, too.  Mrs. Sultana says she has Graves disease, causing her thyroid to stop working.    Past Medical History:   Diagnosis Date    Asthma     BMI 40.0-44.9, adult ([facility]) 7/21/2020    Diverticulitis     Graves disease     Hyperlipidemia     Hypertension     Hyperthyroidism     Hypothyroidism     Lymphedema of both lower extremities 2/7/2020    Mild persistent asthma without complication     Osteoarthritis     Peptic ulcer, unspecified site, unspecified as acute or chronic, without mention of hemorrhage or perforation     Peripheral vascular disease ([facility]) 3/15/2021    Prolonged emergence from general anesthesia     Sleep apnea     CPAP at night    Thrombocytopenia, unspecified 8/26/2021    Venous insufficiency     Wears glasses        Past Surgical History:   Procedure Laterality Date    BLEPHAROPLASTY Left     CHOLECYSTECTOMY  2001    COLONOSCOPY  2011    normal-daniel    COLONOSCOPY N/A 9/20/2022    COLONOSCOPY POLYPECTOMY SNARE/COLD BIOPSY performed by Adrián Otto MD at [facility]COPY    CYST REMOVAL Left 12/06/2021    eye    FOOT SURGERY Left     spurs removed from left heel    HYSTERECTOMY (CERVIX STATUS UNKNOWN)      JOINT REPLACEMENT Left     TKR    KNEE ARTHROSCOPY Left     ROTATOR CUFF REPAIR Right        Social History     Socioeconomic History    Marital status:      Spouse name: Not on file    Number of children: 3    Years of

## 2024-03-04 ENCOUNTER — TRANSCRIBE ORDERS (OUTPATIENT)
Dept: ADMINISTRATIVE | Age: 79
End: 2024-03-04

## 2024-03-04 DIAGNOSIS — N28.89 OTHER SPECIFIED DISORDERS OF KIDNEY AND URETER: Primary | ICD-10-CM

## 2024-03-05 ENCOUNTER — HOSPITAL ENCOUNTER (OUTPATIENT)
Dept: ULTRASOUND IMAGING | Age: 79
Discharge: HOME | End: 2024-03-05
Attending: UROLOGY
Payer: MEDICARE

## 2024-03-05 DIAGNOSIS — N28.89 OTHER SPECIFIED DISORDERS OF KIDNEY AND URETER: ICD-10-CM

## 2024-03-05 PROCEDURE — 76770 US EXAM ABDO BACK WALL COMP: CPT

## 2024-05-10 ENCOUNTER — TELEPHONE (OUTPATIENT)
Dept: VASCULAR SURGERY | Age: 79
End: 2024-05-10

## 2024-05-10 NOTE — TELEPHONE ENCOUNTER
Patient would like a call back regarding her symptoms the last two nights.     She is having leg cramps and \"charley horses\"  They are \"debilitating.\"     She uses daily:  Lymphapress.   Compression stockings.     She has a Trip to [place] next week. Wants to feel better.     She has tried pickle juice, tums, bananas, and takes prescription strength magnesium already.

## 2024-05-10 NOTE — TELEPHONE ENCOUNTER
I spoke with Laura.  She says it is only cramping in her legs at night-NOT rest pain.  She is going to try stretching before bed, as well as making sure she drinks enough water. I offered a call on Monday or appointment on Tuesday, but she is going to [place] on Monday.

## 2024-06-04 ENCOUNTER — TELEPHONE (OUTPATIENT)
Dept: PULMONOLOGY | Age: 79
End: 2024-06-04

## 2024-06-04 NOTE — TELEPHONE ENCOUNTER
Pt called because her CPAP is stating \"motor life exceeded\" Her DME needs a new order for a new machine. Pt is scheduled for next available in July but insisted I send Dr. Armstrong a message to see if anything can be done sooner. Last seen 2023

## 2024-06-11 ENCOUNTER — OFFICE VISIT (OUTPATIENT)
Dept: PULMONOLOGY | Age: 79
End: 2024-06-11
Payer: MEDICARE

## 2024-06-11 VITALS
TEMPERATURE: 97.6 F | RESPIRATION RATE: 18 BRPM | DIASTOLIC BLOOD PRESSURE: 68 MMHG | HEART RATE: 69 BPM | BODY MASS INDEX: 41.42 KG/M2 | OXYGEN SATURATION: 96 % | SYSTOLIC BLOOD PRESSURE: 132 MMHG | HEIGHT: 61 IN | WEIGHT: 219.4 LBS

## 2024-06-11 DIAGNOSIS — G47.33 OSA (OBSTRUCTIVE SLEEP APNEA): Primary | ICD-10-CM

## 2024-06-11 DIAGNOSIS — E66.01 OBESITY, CLASS III, BMI 40-49.9 (MORBID OBESITY) (HCC): ICD-10-CM

## 2024-06-11 DIAGNOSIS — J45.30 MILD PERSISTENT ASTHMA WITHOUT COMPLICATION: ICD-10-CM

## 2024-06-11 PROBLEM — E66.813 OBESITY, CLASS III, BMI 40-49.9 (MORBID OBESITY) (HCC): Status: ACTIVE | Noted: 2020-07-21

## 2024-06-11 PROCEDURE — 3078F DIAST BP <80 MM HG: CPT | Performed by: INTERNAL MEDICINE

## 2024-06-11 PROCEDURE — 1036F TOBACCO NON-USER: CPT | Performed by: INTERNAL MEDICINE

## 2024-06-11 PROCEDURE — 1090F PRES/ABSN URINE INCON ASSESS: CPT | Performed by: INTERNAL MEDICINE

## 2024-06-11 PROCEDURE — G8399 PT W/DXA RESULTS DOCUMENT: HCPCS | Performed by: INTERNAL MEDICINE

## 2024-06-11 PROCEDURE — 1123F ACP DISCUSS/DSCN MKR DOCD: CPT | Performed by: INTERNAL MEDICINE

## 2024-06-11 PROCEDURE — 3075F SYST BP GE 130 - 139MM HG: CPT | Performed by: INTERNAL MEDICINE

## 2024-06-11 PROCEDURE — G8427 DOCREV CUR MEDS BY ELIG CLIN: HCPCS | Performed by: INTERNAL MEDICINE

## 2024-06-11 PROCEDURE — 99214 OFFICE O/P EST MOD 30 MIN: CPT | Performed by: INTERNAL MEDICINE

## 2024-06-11 PROCEDURE — G8417 CALC BMI ABV UP PARAM F/U: HCPCS | Performed by: INTERNAL MEDICINE

## 2024-06-11 RX ORDER — VIBEGRON 75 MG/1
75 TABLET, FILM COATED ORAL DAILY
COMMUNITY

## 2024-06-11 ASSESSMENT — SLEEP AND FATIGUE QUESTIONNAIRES
HOW LIKELY ARE YOU TO NOD OFF OR FALL ASLEEP WHILE SITTING QUIETLY AFTER LUNCH WITHOUT ALCOHOL: WOULD NEVER DOZE
HOW LIKELY ARE YOU TO NOD OFF OR FALL ASLEEP WHILE SITTING AND TALKING TO SOMEONE: WOULD NEVER DOZE
HOW LIKELY ARE YOU TO NOD OFF OR FALL ASLEEP WHILE SITTING INACTIVE IN A PUBLIC PLACE: WOULD NEVER DOZE
HOW LIKELY ARE YOU TO NOD OFF OR FALL ASLEEP WHILE WATCHING TV: WOULD NEVER DOZE
ESS TOTAL SCORE: 2
HOW LIKELY ARE YOU TO NOD OFF OR FALL ASLEEP WHILE SITTING AND READING: SLIGHT CHANCE OF DOZING
HOW LIKELY ARE YOU TO NOD OFF OR FALL ASLEEP IN A CAR, WHILE STOPPED FOR A FEW MINUTES IN TRAFFIC: WOULD NEVER DOZE
HOW LIKELY ARE YOU TO NOD OFF OR FALL ASLEEP WHEN YOU ARE A PASSENGER IN A CAR FOR AN HOUR WITHOUT A BREAK: SLIGHT CHANCE OF DOZING
HOW LIKELY ARE YOU TO NOD OFF OR FALL ASLEEP WHILE LYING DOWN TO REST IN THE AFTERNOON WHEN CIRCUMSTANCES PERMIT: WOULD NEVER DOZE

## 2024-06-11 NOTE — ASSESSMENT & PLAN NOTE
Titration is not required during this visit.    PAP download information:  Usage > 4 hours  100 %   Pressure setting  17.6 cwp    AHI with usage  2.4     See media tab for full download data.    Laura Sultana  is deriving benefit from PAP demonstrated by improved [place], AHI, symptoms.     Motor life exceeded and need replacing.     She complains of too much pressure.

## 2024-06-11 NOTE — PROGRESS NOTES
Laura Sultana         : 1945  [] [facility]     [] [facility]      [] [facility]     [][facility]    [] [facility]  [] [facility]   [] Other:  Diagnosis: [x] LYNNE (G47.33) [] CSA (G47.31) [] Apnea (G47.30)   Length of Need: [] 12 Months [x] 99 Months [] Other:    Machine (OSIEL!): [] Respironics Dream Station      Auto [] ResMed AirSense     Auto [] Other:     [x]  CPAP () [] Bilevel ()   Mode: [x] Auto [] Spontaneous    Mode: [] Auto [] Spontaneous          9-16 CWP  EPAP Min:   IPAP Max:   PS     Comfort Settings:   - Ramp Pressure: 5 cmH2O                                        - Ramp time: 15 min                                     -  Flex/EPR - 3 full time                                    - For ResMed Bilevel (TiMax-4 sec   TiMin- 0.2 sec)     Humidifier: [x] Heated ()        [x] Water chamber replacement ()/ 1 per 6 months        Mask:  Mask of Patient's Choice   [x] Nasal () /1 per 3 months [] Full Face () /1 per 3 months   [x] Patient choice -Size and fit mask [] Patient Choice - Size and fit mask   [] Dispense:  [] Dispense:    [x] Headgear () / 1 per 3 months [] Headgear () / 1 per 3 months   [x] Replacement Nasal Cushion ()/2 per month [] Interface Replacement ()/1 per month   [] Replacement Nasal Pillows ()/2 per month         Tubing: [x] Heated ()/1 per 3 months    [] Standard ()/1 per 3 months [] Other:           Filters: [x] Non-disposable ()/1 per 6 months     [x] Ultra-Fine, Disposable ()/2 per month        Miscellaneous: [] Chin Strap ()/ 1 per 6 months [] O2 bleed-in:       LPM   [] Oximetry on CPAP/Bilevel []  Other:          Start Order Date: 24    MEDICAL JUSTIFICATION:  I, the undersigned, certify that the above prescribed supplies are medically necessary for this patient’s wellbeing.  In my opinion, the supplies are both reasonable and necessary in reference to accepted standards of medicalpractice in treatment

## 2024-06-11 NOTE — PROGRESS NOTES
REASON FOR CONSULTATION/CC:    Chief Complaint   Patient presents with    Follow-up        Consult at request of   Bear Bryson MD    PCP: Bear Bryson MD    HISTORY OF PRESENT ILLNESS: Laura Sultana is a 79 y.o. year old female with a history of asthma who presents                Nasima  Motor life exceeded.    Nasal pillow.        Asthma / allergic rhinitis     Currently ill.  Using albuterol slightly more.      Claritin .                      8/30/2022     8:44 AM 8/30/2021     2:22 PM 8/10/2020     8:39 AM 9/24/2019    11:11 AM 4/16/2019    10:26 AM 2/11/2019     8:01 AM 8/15/2018     2:39 PM   ASTHMA CONTROL TEST   In the past 4 weeks, how much of the time did your asthma keep you from getting as much done at work, school or at home? 5 4 4 4 4 3 3   During the past 4 weeks, how often have you had shortness of breath? 4 4 4 4 4 4 3   During the past 4 weeks, how often did your asthma symptoms (wheezing, coughing, shortness of breath, chest tightness or pain) wake you up at night or earlier than usual in the morning? 5 4 4 5 5 4 2   During the past 4 weeks, how often have you used your rescue inhaler or nebulizer medication (such as albuterol)? 4 3 4 4 5 4 3   How would you rate your asthma control during the past 4 weeks? 5 4 4 5 5 4 3   Asthma Control Test Total Score 23 19 20 22 23 19 14         Objective:   PHYSICAL EXAM:  Blood pressure 132/68, pulse 69, temperature 97.6 °F (36.4 °C), resp. rate 18, height 1.549 m (5' 1\"), weight 99.5 kg (219 lb 6.4 oz), SpO2 96 %, not currently breastfeeding.'    Gen: No distress.   Body mass index is 41.46 kg/m².   ENT:   Resp: No accessory muscle use. No crackles. No wheezes. No rhonchi.    CV: Regular rate. Regular rhythm. No murmur or rub. No edema.   Skin: Warm, dry, normal texture and turgor. No nodule on exposed extremities.   M/S: No cyanosis. No clubbing. No joint deformity.  Psych: Oriented x 3. No anxiety.  Awake. Alert. Intact judgement and insight. Good

## 2024-06-26 ENCOUNTER — HOSPITAL ENCOUNTER (OUTPATIENT)
Dept: WOMENS IMAGING | Age: 79
Discharge: HOME | End: 2024-06-26
Payer: MEDICARE

## 2024-06-26 VITALS — WEIGHT: 220 LBS | BODY MASS INDEX: 41.54 KG/M2 | HEIGHT: 61 IN

## 2024-06-26 DIAGNOSIS — Z12.31 SCREENING MAMMOGRAM FOR BREAST CANCER: ICD-10-CM

## 2024-06-26 PROCEDURE — 77063 BREAST TOMOSYNTHESIS BI: CPT

## 2024-06-27 ENCOUNTER — HOSPITAL ENCOUNTER (OUTPATIENT)
Dept: GENERAL RADIOLOGY | Age: 79
Discharge: HOME | End: 2024-06-27
Attending: STUDENT IN AN ORGANIZED HEALTH CARE EDUCATION/TRAINING PROGRAM
Payer: MEDICARE

## 2024-06-27 ENCOUNTER — HOSPITAL ENCOUNTER (OUTPATIENT)
Age: 79
Discharge: HOME | End: 2024-06-27
Payer: MEDICARE

## 2024-06-27 DIAGNOSIS — R07.81 RIB TENDERNESS: ICD-10-CM

## 2024-06-27 PROCEDURE — 71111 X-RAY EXAM RIBS/CHEST4/> VWS: CPT

## 2024-07-24 NOTE — PROGRESS NOTES
Date/Time    WBC 7.1 2023 09:58 AM    RBC 4.57 2023 09:58 AM    HGB 13.4 2023 09:58 AM    HCT 40.8 2023 09:58 AM    MCV 89.2 2023 09:58 AM    RDW 13.4 2023 09:58 AM     2023 09:58 AM     Lab Results   Component Value Date/Time     2024 10:23 AM    K 3.7 2024 10:23 AM    K 3.6 2022 01:35 PM     2024 10:23 AM    CO2 27 2024 10:23 AM    BUN 21 2024 10:23 AM    CREATININE 0.7 2024 10:23 AM    GFRAA >60 10/13/2022 11:03 AM    GFRAA >60 2013 09:51 AM    AGRATIO 1.7 2024 10:23 AM    LABGLOM >60 2024 10:23 AM    GLUCOSE 93 2024 10:23 AM    GLUCOSE 98 2011 08:53 AM    CALCIUM 9.5 2024 10:23 AM    BILITOT 0.3 2024 10:23 AM    ALKPHOS 107 2024 10:23 AM    AST 11 2024 10:23 AM    ALT 13 2024 10:23 AM     No results found for: \"PTINR\"  Lab Results   Component Value Date    LABA1C 5.4 2024     Lab Results   Component Value Date    TROPONINI <0.01 2022       Cardiac, Vascular and Imaging Data all Personally Reviewed in Detail by Myself      EK2020 Sinus bradycardia    Echocardiogram:   ECHO 2022  Left ventricular cavity size is normal with mild concentric left ventricular  hypertrophy.  Overall left ventricular systolic function appears normal with an estimated  ejection fraction of 55%.  Minimal mitral annular calcification  Mild mitral, tricuspid and aortic regurgitation  The right ventricle is slightly enlarged.  TAPSE measures: 2.91 cm. RV S velocity measures: 19.8 cm/s.  The right atrium is enlarged.  Estimated pulmonary artery systolic pressure is at 42 mmHg assuming a right  atrial pressure of 3 mmHg.    ECHO 10/17/19  Summary  Suboptimal image quality.  Normal left ventricle size, wall thickness, and systolic function with an  estimated ejection fraction of 55-60%. No regional wall motion abnormalities are seen.  Normal right ventricular

## 2024-07-26 ENCOUNTER — OFFICE VISIT (OUTPATIENT)
Dept: CARDIOLOGY CLINIC | Age: 79
End: 2024-07-26
Payer: MEDICARE

## 2024-07-26 VITALS
DIASTOLIC BLOOD PRESSURE: 72 MMHG | SYSTOLIC BLOOD PRESSURE: 124 MMHG | HEART RATE: 71 BPM | OXYGEN SATURATION: 97 % | BODY MASS INDEX: 42.16 KG/M2 | WEIGHT: 223 LBS

## 2024-07-26 DIAGNOSIS — E78.2 MIXED HYPERLIPIDEMIA: ICD-10-CM

## 2024-07-26 DIAGNOSIS — I10 ESSENTIAL HYPERTENSION: ICD-10-CM

## 2024-07-26 DIAGNOSIS — Q21.12 PFO (PATENT FORAMEN OVALE): ICD-10-CM

## 2024-07-26 DIAGNOSIS — R07.9 CHEST PAIN IN ADULT: ICD-10-CM

## 2024-07-26 DIAGNOSIS — I63.9 CEREBROVASCULAR ACCIDENT (CVA), UNSPECIFIED MECHANISM (HCC): Primary | ICD-10-CM

## 2024-07-26 PROCEDURE — G8417 CALC BMI ABV UP PARAM F/U: HCPCS | Performed by: INTERNAL MEDICINE

## 2024-07-26 PROCEDURE — 1090F PRES/ABSN URINE INCON ASSESS: CPT | Performed by: INTERNAL MEDICINE

## 2024-07-26 PROCEDURE — 3078F DIAST BP <80 MM HG: CPT | Performed by: INTERNAL MEDICINE

## 2024-07-26 PROCEDURE — 99214 OFFICE O/P EST MOD 30 MIN: CPT | Performed by: INTERNAL MEDICINE

## 2024-07-26 PROCEDURE — G8399 PT W/DXA RESULTS DOCUMENT: HCPCS | Performed by: INTERNAL MEDICINE

## 2024-07-26 PROCEDURE — 1036F TOBACCO NON-USER: CPT | Performed by: INTERNAL MEDICINE

## 2024-07-26 PROCEDURE — 1123F ACP DISCUSS/DSCN MKR DOCD: CPT | Performed by: INTERNAL MEDICINE

## 2024-07-26 PROCEDURE — 3074F SYST BP LT 130 MM HG: CPT | Performed by: INTERNAL MEDICINE

## 2024-07-26 PROCEDURE — G8427 DOCREV CUR MEDS BY ELIG CLIN: HCPCS | Performed by: INTERNAL MEDICINE

## 2024-08-19 ENCOUNTER — HOSPITAL ENCOUNTER (OUTPATIENT)
Dept: CT IMAGING | Age: 79
Discharge: HOME | End: 2024-08-19
Attending: INTERNAL MEDICINE
Payer: MEDICARE

## 2024-08-19 VITALS — SYSTOLIC BLOOD PRESSURE: 136 MMHG | DIASTOLIC BLOOD PRESSURE: 75 MMHG | HEART RATE: 70 BPM

## 2024-08-19 DIAGNOSIS — R07.9 CHEST PAIN IN ADULT: ICD-10-CM

## 2024-08-19 LAB
PERFORMED ON: NORMAL
POC CREATININE: 0.9 MG/DL (ref 0.6–1.2)
POC SAMPLE TYPE: NORMAL

## 2024-08-19 PROCEDURE — 75574 CT ANGIO HRT W/3D IMAGE: CPT

## 2024-08-19 PROCEDURE — 82565 ASSAY OF CREATININE: CPT

## 2024-08-19 PROCEDURE — 2500000003 HC RX 250 WO HCPCS: Performed by: RADIOLOGY

## 2024-08-19 PROCEDURE — 6360000004 HC RX CONTRAST MEDICATION: Performed by: INTERNAL MEDICINE

## 2024-08-19 PROCEDURE — 6370000000 HC RX 637 (ALT 250 FOR IP): Performed by: RADIOLOGY

## 2024-08-19 RX ORDER — SODIUM CHLORIDE 0.9 % (FLUSH) 0.9 %
5-40 SYRINGE (ML) INJECTION PRN
Status: DISCONTINUED | OUTPATIENT
Start: 2024-08-19 | End: 2024-08-20 | Stop reason: HOSPADM

## 2024-08-19 RX ORDER — SODIUM CHLORIDE 0.9 % (FLUSH) 0.9 %
5-40 SYRINGE (ML) INJECTION EVERY 12 HOURS SCHEDULED
Status: DISCONTINUED | OUTPATIENT
Start: 2024-08-19 | End: 2024-08-20 | Stop reason: HOSPADM

## 2024-08-19 RX ORDER — SODIUM CHLORIDE 9 MG/ML
INJECTION, SOLUTION INTRAVENOUS PRN
Status: DISCONTINUED | OUTPATIENT
Start: 2024-08-19 | End: 2024-08-20 | Stop reason: HOSPADM

## 2024-08-19 RX ORDER — METOPROLOL TARTRATE 1 MG/ML
5 INJECTION, SOLUTION INTRAVENOUS EVERY 5 MIN PRN
Status: DISCONTINUED | OUTPATIENT
Start: 2024-08-19 | End: 2024-08-20 | Stop reason: HOSPADM

## 2024-08-19 RX ORDER — NITROGLYCERIN 0.4 MG/1
0.8 TABLET SUBLINGUAL
Status: COMPLETED | OUTPATIENT
Start: 2024-08-19 | End: 2024-08-19

## 2024-08-19 RX ORDER — NITROGLYCERIN 0.4 MG/1
0.4 TABLET SUBLINGUAL
Status: COMPLETED | OUTPATIENT
Start: 2024-08-19 | End: 2024-08-19

## 2024-08-19 RX ADMIN — NITROGLYCERIN 0.8 MG: 0.4 TABLET, ORALLY DISINTEGRATING SUBLINGUAL at 11:17

## 2024-08-19 RX ADMIN — IOPAMIDOL 100 ML: 755 INJECTION, SOLUTION INTRAVENOUS at 11:47

## 2024-08-19 RX ADMIN — METOPROLOL TARTRATE 5 MG: 1 INJECTION, SOLUTION INTRAVENOUS at 11:25

## 2024-08-19 RX ADMIN — METOPROLOL TARTRATE 5 MG: 1 INJECTION, SOLUTION INTRAVENOUS at 11:15

## 2024-08-19 RX ADMIN — METOPROLOL TARTRATE 5 MG: 1 INJECTION, SOLUTION INTRAVENOUS at 11:10

## 2024-08-19 NOTE — DISCHARGE INSTRUCTIONS
Thank You for choosing [facility] for your medical care.    During your examination, you were given a Beta Blocker called Metopropol or Lopressor to help slow down your heart rate. The dose of the medication you were given was _15mg metoprolol__ and 2 sublingual nitroglycerin.    Although there are few side effects from this medication when given in small amounts (doses) it is important you follow a few important instructions:    Notify the doctor that ordered your test or go to the nearest emergency room if you experience any of the following:  difficulty breathing  dizziness  extreme fatigue  pounding or irregular heart beat    Continue your usual diet, increase fluids today.  (Four 8 ounce glasses of water).                    4.You may return back to your normal activity and routine tomorrow.                Test results will be sent to your Physician.    If you take Actoplus Met, Avandamet, Glucophage, Glucophage XR, Glucovance, Metformin, Metaglip, Riomet, or Fortmet hold medication for 48 hours after procedure and resum

## 2024-08-19 NOTE — PROGRESS NOTES
Pt here for Cardiac CTA test.  Administered 15mg of metoprolol to achieve desired heart rate of 60 BPM.  Administered 0.8mg nitroglycerin sublingual for exam.  Pt tolerated well.  VSS. See flow sheet.  Reviewed Cardiac CTA discharge instructions with pt - verbalized understanding, no further questions. Pt discharged to home.

## 2024-08-22 ENCOUNTER — OFFICE VISIT (OUTPATIENT)
Dept: VASCULAR SURGERY | Age: 79
End: 2024-08-22
Payer: MEDICARE

## 2024-08-22 VITALS
BODY MASS INDEX: 43.78 KG/M2 | SYSTOLIC BLOOD PRESSURE: 135 MMHG | DIASTOLIC BLOOD PRESSURE: 80 MMHG | WEIGHT: 223 LBS | HEART RATE: 71 BPM | HEIGHT: 60 IN

## 2024-08-22 DIAGNOSIS — E78.2 MIXED HYPERLIPIDEMIA: ICD-10-CM

## 2024-08-22 DIAGNOSIS — M79.604 PAIN OF RIGHT LOWER EXTREMITY: Primary | ICD-10-CM

## 2024-08-22 DIAGNOSIS — I89.0 LYMPHEDEMA OF BOTH LOWER EXTREMITIES: ICD-10-CM

## 2024-08-22 DIAGNOSIS — M62.08 DIASTASIS RECTI: ICD-10-CM

## 2024-08-22 DIAGNOSIS — E66.01 OBESITY, CLASS III, BMI 40-49.9 (MORBID OBESITY) (HCC): ICD-10-CM

## 2024-08-22 DIAGNOSIS — I10 ESSENTIAL HYPERTENSION: ICD-10-CM

## 2024-08-22 DIAGNOSIS — I63.441 CEREBROVASCULAR ACCIDENT (CVA) DUE TO EMBOLISM OF RIGHT CEREBELLAR ARTERY (HCC): ICD-10-CM

## 2024-08-22 PROCEDURE — G8417 CALC BMI ABV UP PARAM F/U: HCPCS | Performed by: SURGERY

## 2024-08-22 PROCEDURE — G8427 DOCREV CUR MEDS BY ELIG CLIN: HCPCS | Performed by: SURGERY

## 2024-08-22 PROCEDURE — 3079F DIAST BP 80-89 MM HG: CPT | Performed by: SURGERY

## 2024-08-22 PROCEDURE — 1036F TOBACCO NON-USER: CPT | Performed by: SURGERY

## 2024-08-22 PROCEDURE — 3075F SYST BP GE 130 - 139MM HG: CPT | Performed by: SURGERY

## 2024-08-22 PROCEDURE — 1123F ACP DISCUSS/DSCN MKR DOCD: CPT | Performed by: SURGERY

## 2024-08-22 PROCEDURE — 1090F PRES/ABSN URINE INCON ASSESS: CPT | Performed by: SURGERY

## 2024-08-22 PROCEDURE — G8399 PT W/DXA RESULTS DOCUMENT: HCPCS | Performed by: SURGERY

## 2024-08-22 PROCEDURE — 99214 OFFICE O/P EST MOD 30 MIN: CPT | Performed by: SURGERY

## 2024-08-22 ASSESSMENT — ENCOUNTER SYMPTOMS
RESPIRATORY NEGATIVE: 1
ALLERGIC/IMMUNOLOGIC NEGATIVE: 1
COLOR CHANGE: 1
GASTROINTESTINAL NEGATIVE: 1
EYES NEGATIVE: 1

## 2024-08-22 NOTE — PROGRESS NOTES
TABS, Take 1 tablet by mouth daily, Disp: , Rfl:     cyanocobalamin ([employer] VITAMIN B12) 1000 MCG tablet, Take 1 tablet by mouth daily, Disp: 30 tablet, Rfl: 3    aspirin 81 MG tablet, Take 1 tablet by mouth daily, Disp: , Rfl:     Montelukast sodium, Sulfa antibiotics, Valium [diazepam], Strawberry, and Strawberry extract    Vitals:    08/22/24 0912   BP: 135/80   Pulse: 71   Weight: 101.2 kg (223 lb)   Height: 1.524 m (5')       Hospital Outpatient Visit on 08/19/2024   Component Date Value Ref Range Status    POC Creatinine 08/19/2024 0.9  0.6 - 1.2 mg/dL Final    Est, Glom Filt Rate 08/19/2024 64  >60 Final    Comment: Pediatric calculator link  https://www.kidney.org/professionals/kdoqi/gfr_calculatorped  Effective Oct 3, 2022  These results are not intended for use in patients  <18 years of age.  eGFR results are calculated without  a race factor using the 2021 CKD-EPI equation.  Careful  clinical correlation is recommended, particularly when  comparing to results calculated using previous equations.  The CKD-EPI equation is less accurate in patients with  extremes of muscle mass, extra-renal metabolism of  creatinine, excessive creatinine ingestion, or following  therapy that affects renal tubular secretion.      Sample Type 08/19/2024 ELLIOT   Final    Performed on 08/19/2024 SEE BELOW   Final    Performed on POC       Review of Systems   Constitutional:  Positive for fatigue. Negative for activity change, appetite change, chills, diaphoresis, fever and unexpected weight change.   HENT: Negative.     Eyes: Negative.    Respiratory: Negative.     Cardiovascular:  Positive for leg swelling. Negative for chest pain and palpitations.   Gastrointestinal: Negative.    Endocrine: Negative.    Genitourinary: Negative.    Musculoskeletal: Negative.    Skin:  Positive for color change (hemosiderin, BLE).   Allergic/Immunologic: Negative.    Neurological: Negative.    Hematological: Negative.    Psychiatric/Behavioral:

## 2024-08-26 ENCOUNTER — TELEPHONE (OUTPATIENT)
Dept: CARDIOLOGY CLINIC | Age: 79
End: 2024-08-26

## 2024-08-26 NOTE — TELEPHONE ENCOUNTER
Patient calling for coronary CTA results. Please review.     IMPRESSION:     1.  Right coronary artery dominance.  2.  Mild multifocal stenosis of the LAD.  3.  Mild stenosis of the proximal circumflex.  4.  Mild stenosis of the mid right coronary artery.  5.  Mild left ventricular apical wall thinning.           Calcium score of 144, which implies definite, at least moderate atherosclerotic  plaque, with mild coronary artery disease highly likely and significant  narrowings possible.

## 2024-08-26 NOTE — TELEPHONE ENCOUNTER
Reviewed with Dr Alcaraz. Mild to moderate disease. Continue medical management. Called patient and made aware of results and recommendations. She verbalized understanding.

## 2024-08-26 NOTE — TELEPHONE ENCOUNTER
Patient called in wanting to know the results of her CTA from 8/19.   She states that her PCP told her the results were in her chart already.     Please advise.     Callback: [phone]

## 2024-09-04 ENCOUNTER — OFFICE VISIT (OUTPATIENT)
Dept: PULMONOLOGY | Age: 79
End: 2024-09-04
Payer: MEDICARE

## 2024-09-04 VITALS
TEMPERATURE: 97.4 F | WEIGHT: 223.6 LBS | OXYGEN SATURATION: 94 % | RESPIRATION RATE: 18 BRPM | HEART RATE: 70 BPM | DIASTOLIC BLOOD PRESSURE: 70 MMHG | HEIGHT: 60 IN | SYSTOLIC BLOOD PRESSURE: 110 MMHG | BODY MASS INDEX: 43.9 KG/M2

## 2024-09-04 DIAGNOSIS — G47.33 OSA (OBSTRUCTIVE SLEEP APNEA): Primary | ICD-10-CM

## 2024-09-04 DIAGNOSIS — J30.89 OTHER ALLERGIC RHINITIS: ICD-10-CM

## 2024-09-04 DIAGNOSIS — J45.30 MILD PERSISTENT ASTHMA WITHOUT COMPLICATION: ICD-10-CM

## 2024-09-04 DIAGNOSIS — E66.01 OBESITY, CLASS III, BMI 40-49.9 (MORBID OBESITY) (HCC): ICD-10-CM

## 2024-09-04 PROCEDURE — G8428 CUR MEDS NOT DOCUMENT: HCPCS | Performed by: INTERNAL MEDICINE

## 2024-09-04 PROCEDURE — 99214 OFFICE O/P EST MOD 30 MIN: CPT | Performed by: INTERNAL MEDICINE

## 2024-09-04 PROCEDURE — 1036F TOBACCO NON-USER: CPT | Performed by: INTERNAL MEDICINE

## 2024-09-04 PROCEDURE — G8399 PT W/DXA RESULTS DOCUMENT: HCPCS | Performed by: INTERNAL MEDICINE

## 2024-09-04 PROCEDURE — 3074F SYST BP LT 130 MM HG: CPT | Performed by: INTERNAL MEDICINE

## 2024-09-04 PROCEDURE — 3078F DIAST BP <80 MM HG: CPT | Performed by: INTERNAL MEDICINE

## 2024-09-04 PROCEDURE — G8417 CALC BMI ABV UP PARAM F/U: HCPCS | Performed by: INTERNAL MEDICINE

## 2024-09-04 PROCEDURE — 1123F ACP DISCUSS/DSCN MKR DOCD: CPT | Performed by: INTERNAL MEDICINE

## 2024-09-04 PROCEDURE — 1090F PRES/ABSN URINE INCON ASSESS: CPT | Performed by: INTERNAL MEDICINE

## 2024-09-04 ASSESSMENT — SLEEP AND FATIGUE QUESTIONNAIRES
HOW LIKELY ARE YOU TO NOD OFF OR FALL ASLEEP IN A CAR, WHILE STOPPED FOR A FEW MINUTES IN TRAFFIC: WOULD NEVER DOZE
HOW LIKELY ARE YOU TO NOD OFF OR FALL ASLEEP WHILE WATCHING TV: MODERATE CHANCE OF DOZING
HOW LIKELY ARE YOU TO NOD OFF OR FALL ASLEEP WHEN YOU ARE A PASSENGER IN A CAR FOR AN HOUR WITHOUT A BREAK: MODERATE CHANCE OF DOZING
HOW LIKELY ARE YOU TO NOD OFF OR FALL ASLEEP WHILE SITTING AND TALKING TO SOMEONE: WOULD NEVER DOZE
HOW LIKELY ARE YOU TO NOD OFF OR FALL ASLEEP WHILE SITTING QUIETLY AFTER LUNCH WITHOUT ALCOHOL: SLIGHT CHANCE OF DOZING
HOW LIKELY ARE YOU TO NOD OFF OR FALL ASLEEP WHILE LYING DOWN TO REST IN THE AFTERNOON WHEN CIRCUMSTANCES PERMIT: WOULD NEVER DOZE
HOW LIKELY ARE YOU TO NOD OFF OR FALL ASLEEP WHILE SITTING INACTIVE IN A PUBLIC PLACE: WOULD NEVER DOZE

## 2024-09-04 NOTE — ASSESSMENT & PLAN NOTE
Controlled with Wixela and albuterol as needed.    Discussed the cost and the coupon for Wixela printed

## 2024-09-04 NOTE — PROGRESS NOTES
REASON FOR CONSULTATION/CC:    Chief Complaint   Patient presents with    Sleep Apnea        Consult at request of   Bear Bryson MD    PCP: Bear Bryson MD    HISTORY OF PRESENT ILLNESS: Laura Sultana is a 79 y.o. year old female with a history of asthma who presents               LYNNE  Using Resmed S10.   Mask: nasal cushion.     Having issues with leak.  Changing out strap now.        Asthma    Wixela. Helpping with breathing.       allergic rhinitis   Using Claritin   Having slight cough.   post nasal drip with drainage and light procuction                      8/30/2022     8:44 AM 8/30/2021     2:22 PM 8/10/2020     8:39 AM 9/24/2019    11:11 AM 4/16/2019    10:26 AM 2/11/2019     8:01 AM 8/15/2018     2:39 PM   ASTHMA CONTROL TEST   In the past 4 weeks, how much of the time did your asthma keep you from getting as much done at work, school or at home? 5 4 4 4 4 3 3   During the past 4 weeks, how often have you had shortness of breath? 4 4 4 4 4 4 3   During the past 4 weeks, how often did your asthma symptoms (wheezing, coughing, shortness of breath, chest tightness or pain) wake you up at night or earlier than usual in the morning? 5 4 4 5 5 4 2   During the past 4 weeks, how often have you used your rescue inhaler or nebulizer medication (such as albuterol)? 4 3 4 4 5 4 3   How would you rate your asthma control during the past 4 weeks? 5 4 4 5 5 4 3   Asthma Control Test Total Score 23 19 20 22 23 19 14         Objective:   PHYSICAL EXAM:  Blood pressure 110/70, pulse 70, temperature 97.4 °F (36.3 °C), resp. rate 18, height 1.524 m (5'), weight 101.4 kg (223 lb 9.6 oz), SpO2 94%, not currently breastfeeding.'    Gen: No distress.   Body mass index is 43.67 kg/m².   ENT:   Resp: No accessory muscle use. No crackles. No wheezes. No rhonchi.    CV: Regular rate. Regular rhythm. No murmur or rub. No edema.   Skin: Warm, dry, normal texture and turgor. No nodule on exposed extremities.   M/S: No

## 2024-09-04 NOTE — ASSESSMENT & PLAN NOTE
Currently using Claritin.  Continues to have sinus congestion drainage and postnasal drip which is likely causing cough.  We discussed the use of Astelin/Astepro.

## 2024-09-04 NOTE — ASSESSMENT & PLAN NOTE
Download assessed from the device.  It appears the mask leak has resolved with the change of head strap.  We discussed buying extra head straps from [employer].  She expressed understanding for this.

## 2024-09-16 RX ORDER — PRAVASTATIN SODIUM 20 MG
20 TABLET ORAL DAILY
Qty: 90 TABLET | Refills: 3 | Status: SHIPPED | OUTPATIENT
Start: 2024-09-16

## 2024-10-05 PROBLEM — Z86.73 HISTORY OF STROKE: Status: ACTIVE | Noted: 2024-10-05

## 2024-12-12 ENCOUNTER — TRANSCRIBE ORDERS (OUTPATIENT)
Dept: ADMINISTRATIVE | Age: 79
End: 2024-12-12

## 2024-12-12 DIAGNOSIS — N28.89 OTHER SPECIFIED DISORDERS OF KIDNEY AND URETER: Primary | ICD-10-CM

## 2025-02-28 ENCOUNTER — OFFICE VISIT (OUTPATIENT)
Dept: VASCULAR SURGERY | Age: 80
End: 2025-02-28
Payer: MEDICARE

## 2025-02-28 VITALS
WEIGHT: 223.9 LBS | DIASTOLIC BLOOD PRESSURE: 77 MMHG | BODY MASS INDEX: 43.96 KG/M2 | SYSTOLIC BLOOD PRESSURE: 131 MMHG | HEIGHT: 60 IN

## 2025-02-28 DIAGNOSIS — E66.01 OBESITY, CLASS III, BMI 40-49.9 (MORBID OBESITY): ICD-10-CM

## 2025-02-28 DIAGNOSIS — I10 ESSENTIAL HYPERTENSION: Primary | ICD-10-CM

## 2025-02-28 DIAGNOSIS — Q21.12 PFO (PATENT FORAMEN OVALE): ICD-10-CM

## 2025-02-28 DIAGNOSIS — E78.2 MIXED HYPERLIPIDEMIA: ICD-10-CM

## 2025-02-28 DIAGNOSIS — I89.0 LYMPHEDEMA OF BOTH LOWER EXTREMITIES: ICD-10-CM

## 2025-02-28 PROCEDURE — 1123F ACP DISCUSS/DSCN MKR DOCD: CPT | Performed by: SURGERY

## 2025-02-28 PROCEDURE — 3075F SYST BP GE 130 - 139MM HG: CPT | Performed by: SURGERY

## 2025-02-28 PROCEDURE — 1159F MED LIST DOCD IN RCRD: CPT | Performed by: SURGERY

## 2025-02-28 PROCEDURE — 3078F DIAST BP <80 MM HG: CPT | Performed by: SURGERY

## 2025-02-28 PROCEDURE — 99214 OFFICE O/P EST MOD 30 MIN: CPT | Performed by: SURGERY

## 2025-02-28 ASSESSMENT — ENCOUNTER SYMPTOMS
GASTROINTESTINAL NEGATIVE: 1
ALLERGIC/IMMUNOLOGIC NEGATIVE: 1
RESPIRATORY NEGATIVE: 1
COLOR CHANGE: 1
EYES NEGATIVE: 1

## 2025-02-28 NOTE — PROGRESS NOTES
performed by Adrián Otto MD at [facility]   • CYST REMOVAL Left 2021    eye   • FOOT SURGERY Left     spurs removed from left heel   • HYSTERECTOMY (CERVIX STATUS UNKNOWN)     • JOINT REPLACEMENT Left     TKR   • KNEE ARTHROSCOPY Left    • ROTATOR CUFF REPAIR Right        Social History     Socioeconomic History   • Marital status:      Spouse name: Not on file   • Number of children: 3   • Years of education: Not on file   • Highest education level: Not on file   Occupational History   • Not on file   Tobacco Use   • Smoking status: Former     Current packs/day: 0.00     Average packs/day: 0.3 packs/day for 3.0 years (0.8 ttl pk-yrs)     Types: Cigarettes     Start date: 10/21/1961     Quit date: 10/21/1964     Years since quittin.3     Passive exposure: Past   • Smokeless tobacco: Never   Vaping Use   • Vaping status: Never Used   Substance and Sexual Activity   • Alcohol use: Yes     Comment: SOCIALLY-wine   • Drug use: No   • Sexual activity: Never   Other Topics Concern   • Not on file   Social History Narrative   • Not on file     Social Determinants of Health     Financial Resource Strain: Low Risk  (6/10/2024)    Overall Financial Resource Strain (CARDIA)    • Difficulty of Paying Living Expenses: Not hard at all   Food Insecurity: No Food Insecurity (2025)    Hunger Vital Sign    • Worried About Running Out of Food in the Last Year: Never true    • Ran Out of Food in the Last Year: Never true   Transportation Needs: No Transportation Needs (2025)    PRAPARE - Transportation    • Lack of Transportation (Medical): No    • Lack of Transportation (Non-Medical): No   Physical Activity: Sufficiently Active (3/7/2024)    Exercise Vital Sign    • Days of Exercise per Week: 3 days    • Minutes of Exercise per Session: 60 min   Stress: Not on file   Social Connections: Not on file   Intimate Partner Violence: Unknown (2024)    Received from [facility] [employer]

## 2025-03-06 ENCOUNTER — HOSPITAL ENCOUNTER (OUTPATIENT)
Dept: ULTRASOUND IMAGING | Age: 80
Discharge: HOME | End: 2025-03-06
Attending: UROLOGY
Payer: MEDICARE

## 2025-03-06 DIAGNOSIS — N28.89 OTHER SPECIFIED DISORDERS OF KIDNEY AND URETER: ICD-10-CM

## 2025-03-06 PROCEDURE — 76770 US EXAM ABDO BACK WALL COMP: CPT

## 2025-03-10 ENCOUNTER — OFFICE VISIT (OUTPATIENT)
Dept: PULMONOLOGY | Age: 80
End: 2025-03-10
Payer: MEDICARE

## 2025-03-10 VITALS
DIASTOLIC BLOOD PRESSURE: 76 MMHG | TEMPERATURE: 97.4 F | WEIGHT: 223.4 LBS | OXYGEN SATURATION: 95 % | BODY MASS INDEX: 43.86 KG/M2 | RESPIRATION RATE: 18 BRPM | SYSTOLIC BLOOD PRESSURE: 132 MMHG | HEIGHT: 60 IN | HEART RATE: 76 BPM

## 2025-03-10 DIAGNOSIS — J45.30 MILD PERSISTENT ASTHMA WITHOUT COMPLICATION: Primary | ICD-10-CM

## 2025-03-10 DIAGNOSIS — E66.01 OBESITY, CLASS III, BMI 40-49.9 (MORBID OBESITY): ICD-10-CM

## 2025-03-10 DIAGNOSIS — G47.33 OSA (OBSTRUCTIVE SLEEP APNEA): ICD-10-CM

## 2025-03-10 PROCEDURE — 99214 OFFICE O/P EST MOD 30 MIN: CPT | Performed by: INTERNAL MEDICINE

## 2025-03-10 PROCEDURE — 1159F MED LIST DOCD IN RCRD: CPT | Performed by: INTERNAL MEDICINE

## 2025-03-10 PROCEDURE — 1123F ACP DISCUSS/DSCN MKR DOCD: CPT | Performed by: INTERNAL MEDICINE

## 2025-03-10 PROCEDURE — G2211 COMPLEX E/M VISIT ADD ON: HCPCS | Performed by: INTERNAL MEDICINE

## 2025-03-10 PROCEDURE — 3078F DIAST BP <80 MM HG: CPT | Performed by: INTERNAL MEDICINE

## 2025-03-10 PROCEDURE — 3075F SYST BP GE 130 - 139MM HG: CPT | Performed by: INTERNAL MEDICINE

## 2025-03-10 NOTE — ASSESSMENT & PLAN NOTE
Improved with change to current device and mask.      Occasional leak.     at home takes amlodipine 10mg, coreg 25 BID and lisinopril 10 at home for BP control  -continue to hold  home anti-HTN meds  -c/w Coreg 3.125BID c/w lipitor 80mg      Can be reached via TEAMS/pager: [phone]   office:  [phone] (M-F 9a-5pm)               [phone] (nights/weekends)   [email or web address] password [email or web address] -had been on  coreg 25 BID and lisinopril 10 at home for BP control  -continue to hold  home antiHTN meds i/s/o presenting hypotension  -can resume Coreg at lower dose if BP permits

## 2025-03-10 NOTE — ASSESSMENT & PLAN NOTE
Controlled with Wixela and albuterol as needed.    With current control, discussed holding off on Wixela until fall (worse time of the year).

## 2025-03-10 NOTE — PROGRESS NOTES
REASON FOR CONSULTATION/CC:    Chief Complaint   Patient presents with    Sleep Apnea        Consult at request of   Bear Bryson MD    PCP: Bear Bryson MD    HISTORY OF PRESENT ILLNESS: Laura Sultana is a 80 y.o. year old female with a history of asthma who presents                LYNNE  Mask: nasal cushion.   Resmed S10.     Having leak with laying on side.       Asthma  Wixela - discussed stopped with great control.   albuterol rare prn.       allergic rhinitis    Claritin.   Having some drainage.   Does not want treatment.   Discussed sinus rinse.                               8/30/2022     8:44 AM 8/30/2021     2:22 PM 8/10/2020     8:39 AM 9/24/2019    11:11 AM 4/16/2019    10:26 AM 2/11/2019     8:01 AM 8/15/2018     2:39 PM   ASTHMA CONTROL TEST   In the past 4 weeks, how much of the time did your asthma keep you from getting as much done at work, school or at home? 5 4 4 4 4 3 3   During the past 4 weeks, how often have you had shortness of breath? 4 4 4 4 4 4 3   During the past 4 weeks, how often did your asthma symptoms (wheezing, coughing, shortness of breath, chest tightness or pain) wake you up at night or earlier than usual in the morning? 5 4 4 5 5 4 2   During the past 4 weeks, how often have you used your rescue inhaler or nebulizer medication (such as albuterol)? 4 3 4 4 5 4 3   How would you rate your asthma control during the past 4 weeks? 5 4 4 5 5 4 3   Asthma Control Test Total Score 23 19 20 22 23 19 14         Objective:   PHYSICAL EXAM:  Blood pressure 132/76, pulse 76, temperature 97.4 °F (36.3 °C), temperature source Temporal, resp. rate 18, height 1.524 m (5'), weight 101.3 kg (223 lb 6.4 oz), SpO2 95%, not currently breastfeeding.'    Gen: No distress.   Body mass index is 43.63 kg/m².   ENT:   Resp: No accessory muscle use. No crackles. No wheezes. No rhonchi.    CV: Regular rate. Regular rhythm. No murmur or rub. No edema.   Skin: Warm, dry, normal texture and turgor. No

## 2025-04-28 NOTE — TELEPHONE ENCOUNTER
Last seen 4/16/19 What Type Of Note Output Would You Prefer (Optional)?: Bullet Format Hpi Title: Evaluation of Skin Lesions

## 2025-04-30 ENCOUNTER — APPOINTMENT (OUTPATIENT)
Dept: GENERAL RADIOLOGY | Age: 80
End: 2025-04-30
Payer: MEDICARE

## 2025-04-30 ENCOUNTER — APPOINTMENT (OUTPATIENT)
Dept: CT IMAGING | Age: 80
End: 2025-04-30
Payer: MEDICARE

## 2025-04-30 ENCOUNTER — HOSPITAL ENCOUNTER (EMERGENCY)
Age: 80
Discharge: HOME | End: 2025-05-01
Attending: EMERGENCY MEDICINE
Payer: MEDICARE

## 2025-04-30 VITALS
HEART RATE: 72 BPM | HEIGHT: 60 IN | DIASTOLIC BLOOD PRESSURE: 96 MMHG | TEMPERATURE: 98 F | WEIGHT: 233.69 LBS | SYSTOLIC BLOOD PRESSURE: 169 MMHG | RESPIRATION RATE: 20 BRPM | OXYGEN SATURATION: 96 % | BODY MASS INDEX: 45.88 KG/M2
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
## 2025-04-30 DIAGNOSIS — S09.90XA CLOSED HEAD INJURY, INITIAL ENCOUNTER: ICD-10-CM

## 2025-04-30 DIAGNOSIS — S42.211A FX HUMERAL NECK, RIGHT, CLOSED, INITIAL ENCOUNTER: Primary | ICD-10-CM

## 2025-04-30 PROCEDURE — 73030 X-RAY EXAM OF SHOULDER: CPT

## 2025-04-30 PROCEDURE — 73060 X-RAY EXAM OF HUMERUS: CPT

## 2025-04-30 PROCEDURE — 93005 ELECTROCARDIOGRAM TRACING: CPT | Performed by: EMERGENCY MEDICINE

## 2025-04-30 PROCEDURE — 6370000000 HC RX 637 (ALT 250 FOR IP): Performed by: EMERGENCY MEDICINE

## 2025-04-30 PROCEDURE — 70450 CT HEAD/BRAIN W/O DYE: CPT

## 2025-04-30 PROCEDURE — 99284 EMERGENCY DEPT VISIT MOD MDM: CPT

## 2025-04-30 RX ORDER — HYDROCODONE BITARTRATE AND ACETAMINOPHEN 5; 325 MG/1; MG/1
1 TABLET ORAL EVERY 8 HOURS PRN
Qty: 12 TABLET | Refills: 0 | Status: SHIPPED | OUTPATIENT
Start: 2025-04-30 | End: 2025-05-04

## 2025-04-30 RX ORDER — TRAMADOL HYDROCHLORIDE 50 MG/1
50 TABLET ORAL ONCE
Status: COMPLETED | OUTPATIENT
Start: 2025-04-30 | End: 2025-04-30

## 2025-04-30 RX ADMIN — TRAMADOL HYDROCHLORIDE 50 MG: 50 TABLET, COATED ORAL at 22:50

## 2025-04-30 ASSESSMENT — PAIN - FUNCTIONAL ASSESSMENT
PAIN_FUNCTIONAL_ASSESSMENT: ACTIVITIES ARE NOT PREVENTED
PAIN_FUNCTIONAL_ASSESSMENT: PREVENTS OR INTERFERES SOME ACTIVE ACTIVITIES AND ADLS
PAIN_FUNCTIONAL_ASSESSMENT: PREVENTS OR INTERFERES SOME ACTIVE ACTIVITIES AND ADLS

## 2025-04-30 ASSESSMENT — PAIN DESCRIPTION - DESCRIPTORS
DESCRIPTORS: ACHING

## 2025-04-30 ASSESSMENT — PAIN DESCRIPTION - PAIN TYPE
TYPE: ACUTE PAIN
TYPE: ACUTE PAIN

## 2025-04-30 ASSESSMENT — PAIN DESCRIPTION - ORIENTATION
ORIENTATION: RIGHT

## 2025-04-30 ASSESSMENT — PAIN SCALES - GENERAL
PAINLEVEL_OUTOF10: 8
PAINLEVEL_OUTOF10: 4
PAINLEVEL_OUTOF10: 7

## 2025-04-30 ASSESSMENT — LIFESTYLE VARIABLES
HOW MANY STANDARD DRINKS CONTAINING ALCOHOL DO YOU HAVE ON A TYPICAL DAY: PATIENT DOES NOT DRINK
HOW OFTEN DO YOU HAVE A DRINK CONTAINING ALCOHOL: NEVER

## 2025-04-30 ASSESSMENT — PAIN DESCRIPTION - LOCATION
LOCATION: SHOULDER;KNEE

## 2025-04-30 ASSESSMENT — PAIN DESCRIPTION - FREQUENCY
FREQUENCY: CONTINUOUS
FREQUENCY: CONTINUOUS

## 2025-05-01 ENCOUNTER — CARE COORDINATION (OUTPATIENT)
Dept: CARE COORDINATION | Age: 80
End: 2025-05-01

## 2025-05-01 LAB
EKG ATRIAL RATE: 71 BPM
EKG DIAGNOSIS: NORMAL
EKG P-R INTERVAL: 172 MS
EKG Q-T INTERVAL: 410 MS
EKG QRS DURATION: 108 MS
EKG QTC CALCULATION (BAZETT): 445 MS
EKG R AXIS: 2 DEGREES
EKG T AXIS: 10 DEGREES
EKG VENTRICULAR RATE: 71 BPM

## 2025-05-01 PROCEDURE — 93010 ELECTROCARDIOGRAM REPORT: CPT | Performed by: INTERNAL MEDICINE

## 2025-05-01 NOTE — ED TRIAGE NOTES
Pt into ED after fall in driveway, patient tripped striking head on side of her brick house. Pt denies loss of consciousness, take baby ASA daily. C/o RUE pain, radiating to posterior R shoulder blade, and R knee. Patient states that prior to today she has felt fine

## 2025-05-01 NOTE — ED PROVIDER NOTES
complication, Osteoarthritis, Peptic ulcer, unspecified site, unspecified as acute or chronic, without mention of hemorrhage or perforation, Peripheral vascular disease, Prolonged emergence from general anesthesia, Sleep apnea, Thrombocytopenia, unspecified, Venous insufficiency, and Wears glasses.    Records Reviewed: Pulmonology office visit note on 3/10/2025 for mild persistent asthma, obesity and LYNNE, vascular surgery office visit note on 2/28/2025 for essential hypertension, obesity, hyperlipidemia, lymphedema of both lower extremities and PFO      Disposition Considerations: Considered treating the patient with IM morphine for pain control but she tolerated oral  tramadol well and did not want any stronger pain medication.  I am the Primary Clinician of Record.        FINAL IMPRESSION    1. Fx humeral neck, right, closed, initial encounter    2. Fall on same level from slipping, tripping or stumbling, initial encounter    3. Closed head injury, initial encounter           DISPOSITION/PLAN:   DISPOSITION Decision To Discharge 04/30/2025 11:32:08 PM  Condition at Disposition: Stable      PATIENT REFERRED TO:   Bear Bryson MD  [address] [facility] [id number] [phone]    Call in 1 day  For a follow up appointment.    Chaz Mobley MD  [address] [facility] [id number] [phone]    Call in 1 day  For a follow up appointment with orthopedic surgery       DISCHARGE MEDICATIONS:   New Prescriptions    HYDROCODONE-ACETAMINOPHEN (NORCO) 5-325 MG PER TABLET    Take 1 tablet by mouth every 8 hours as needed for Pain for up to 4 days. Intended supply: 3 days. Take lowest dose possible to manage pain Max Daily Amount: 3 tablets        DISCONTINUED MEDICATIONS:   Discontinued Medications    No medications on file              (Please note that portions of this note were completed with a voice recognition program.  Efforts were made to edit the dictations but occasionally words are

## 2025-05-01 NOTE — ED NOTES
New sling applied to R arm. Pt tolerated well. Circulation checks complete. Education provided verbally, and written on AVS. Patient states understanding use of sling.

## 2025-05-01 NOTE — DISCHARGE INSTRUCTIONS
Call today or tomorrow to follow up with Bear Bryson MD  in 1-2 days.  Also be sure to call your orthopedic surgeon, Dr. Mobley, first thing in the morning for follow-up appointment.    Take your medication as prescribed for pain.  Wear the splint at all times until seen by the bone doctor.  Keep your hand elevated above your heart as much as possible.    Return to the Emergency Department for worsening of pain not controlled with medication, numbness or tingling in hands, unable to lift wrist up, any other care or concern.

## 2025-05-01 NOTE — CARE COORDINATION
Ambulatory Care Coordination Note     5/1/2025 9:32 AM     Patient outreach attempt by this ACM today to offer care management services. ACM was unable to reach the patient by telephone today;   left voice message requesting a return phone call to this ACM.     ACM: Linsey Booker RN     Care Summary Note: UTRx1    PCP/Specialist follow up:   Future Appointments         Provider Specialty Dept Phone    7/9/2025 10:15 AM Bear Bryson MD Internal Medicine [phone]    7/24/2025 9:45 AM Blaine Alcaraz MD Cardiology [phone]    8/29/2025 9:00 AM Lorenzo Willson MD Vascular Surgery [phone]    3/11/2026 9:00 AM Nabor Armstrong MD Pulmonology [phone]            Follow Up:   Plan for next ACM outreach in approximately 1-2 days  to complete:  - outreach attempt to offer care management services.

## 2025-05-02 ENCOUNTER — CARE COORDINATION (OUTPATIENT)
Dept: CARE COORDINATION | Age: 80
End: 2025-05-02

## 2025-05-02 NOTE — CARE COORDINATION
Ambulatory Care Coordination Note     5/2/2025 9:36 AM     Patient outreach attempt by this ACM today to offer care management services. ACM was unable to reach the patient by telephone today;   left voice message requesting a return phone call to this ACM.     ACM: Linsey Booker RN     Care Summary Note: UTRx2    PCP/Specialist follow up:   Future Appointments         Provider Specialty Dept Phone    7/9/2025 10:15 AM Bear Bryson MD Internal Medicine [phone]    7/24/2025 9:45 AM Blaine Alcaraz MD Cardiology [phone]    8/29/2025 9:00 AM Lorenzo Willson MD Vascular Surgery [phone]    3/11/2026 9:00 AM Nabor Armstrong MD Pulmonology [phone]            Follow Up:   Plan for next ACM outreach in approximately 1-2 days  to complete:  - outreach attempt to offer care management services  - RPM.

## 2025-05-05 ENCOUNTER — CARE COORDINATION (OUTPATIENT)
Dept: CARE COORDINATION | Age: 80
End: 2025-05-05

## 2025-05-05 NOTE — PATIENT INSTRUCTIONS
Laura:    It was a pleasure speaking with you.  Below are several options for a private duty aid.  The information was obtained through the [employer] website.  I have also made a referral to the [place] [employer] to see if you qualify for services.    [address] [address] [email or web address]  Phone: [phone] Fax: [phone]          [place] [address]   [email or web address]   Phone: [phone] Fax: [phone]    [facility] [place], [facility]. [address] [place] [address] [place] [id number] [email or web address]   Phone: [phone] Fax: [phone]      I look forward to out outreaches.        CAMERON OlmosN, RN  Ambulatory Care Manager  Respecting Choices® Advanced Steps ACP Facilitator  [facility]  [address] [place], [place] [address]  C:[phone]  * [email or web address]

## 2025-05-05 NOTE — CARE COORDINATION
Ambulatory Care Coordination Note     2025 4:06 PM     Patient Current Location:  Home:  [facility] [address]     This patient was received as a referral from Ambulatory Care Manager .    ACM contacted the patient by telephone. Verified name and  with patient as identifiers. Provided introduction to self, and explanation of the ACM role.   Patient accepted care management services at this time.          ACM: Linsey Booker RN     Challenges to be reviewed by the provider   Additional needs identified to be addressed with provider Yes  Pt declining to make ED f/u appt.  Pt has already seen ortho and is f/u w/them.               Method of communication with provider: chart routing.    Utilization: Initial Call - Discharge Date: 25   Discharge Facility: [facility]  Reason for ED Visit: fall   Visit Diagnosis: fall w/R humerus fx; pt hit her head w/o LOC    Number of ED visits in the last 6 months: 1      Do you have any ongoing symptoms? Yes, there has been no change in my symptoms.   Current symptoms: R humerus fx.    Did you call your PCP prior to going to the ED? No, did not call the PCP office.     Review of Discharge Instructions:   [x] AVS discharge instructions  [x] Right Care, Right Place, Right Time document  [x] Medication changes  [x] Follow up appointments  [x] Referral follow up   []        Care Summary Note: CC f/u completed.  Pt was pleasant and engaged with no s/s of disease exacerbation.  Pt taking medications as prescribed. Pt declining ED f/u;  PCP informed.  Pt agreeable to ongoing outreaches.  Pt lives at home w/he daughter who works from home.  Pt has another daughter who assists her as needed.  Pt has been to see Ortho, at this time the plan is for the pt to return in 1 week.  If pt is improving, she will start OP PT/OT in 3 weeks.  If not, plan is surgery.  Pt states she is taking tylenol and using ice for pain.  Arm is in a sling.  Pt agreeable to

## 2025-05-12 ENCOUNTER — TELEPHONE (OUTPATIENT)
Dept: SURGERY | Age: 80
End: 2025-05-12

## 2025-05-12 NOTE — TELEPHONE ENCOUNTER
Pt called with concerns of swelling in R leg. Pt has accident and fell on R leg and has been unable to get compression socks on. R leg has been swelling quite a bit since accident. She is able to use her lymphapress to help but it legs swell right back up.   Please call to advise [phone]

## 2025-05-12 NOTE — TELEPHONE ENCOUNTER
I spoke with Laura- she fell on 4/30 and broke her arm, as well as hurt her leg.  As per Dr. Willson she should wait for her leg to heal some, watch for wounds, and keep using the lymphapress and elevating as she is unable to put her stockings on. Luara aware and agreed with advice.

## 2025-05-20 ENCOUNTER — CARE COORDINATION (OUTPATIENT)
Dept: CARE COORDINATION | Age: 80
End: 2025-05-20

## 2025-05-20 SDOH — SOCIAL STABILITY: SOCIAL NETWORK
IN A TYPICAL WEEK, HOW MANY TIMES DO YOU TALK ON THE PHONE WITH FAMILY, FRIENDS, OR NEIGHBORS?: MORE THAN THREE TIMES A WEEK

## 2025-05-20 SDOH — SOCIAL STABILITY: SOCIAL NETWORK: HOW OFTEN DO YOU ATTEND MEETINGS OF THE CLUBS OR ORGANIZATIONS YOU BELONG TO?: MORE THAN 4 TIMES PER YEAR

## 2025-05-20 SDOH — ECONOMIC STABILITY: HOUSING INSECURITY: IN THE LAST 12 MONTHS, WAS THERE A TIME WHEN YOU WERE NOT ABLE TO PAY THE MORTGAGE OR RENT ON TIME?: NO

## 2025-05-20 SDOH — HEALTH STABILITY: MENTAL HEALTH: HOW MANY DRINKS CONTAINING ALCOHOL DO YOU HAVE ON A TYPICAL DAY WHEN YOU ARE DRINKING?: 1 OR 2

## 2025-05-20 SDOH — ECONOMIC STABILITY: FOOD INSECURITY: WITHIN THE PAST 12 MONTHS, YOU WORRIED THAT YOUR FOOD WOULD RUN OUT BEFORE YOU GOT THE MONEY TO BUY MORE.: NEVER TRUE

## 2025-05-20 SDOH — ECONOMIC STABILITY: FOOD INSECURITY: HOW HARD IS IT FOR YOU TO PAY FOR THE VERY BASICS LIKE FOOD, HOUSING, MEDICAL CARE, AND HEATING?: NOT HARD AT ALL

## 2025-05-20 SDOH — SOCIAL STABILITY: SOCIAL INSECURITY: ARE YOU MARRIED, WIDOWED, DIVORCED, SEPARATED, NEVER MARRIED, OR LIVING WITH A PARTNER?: WIDOWED

## 2025-05-20 SDOH — SOCIAL STABILITY: SOCIAL NETWORK
DO YOU BELONG TO ANY CLUBS OR ORGANIZATIONS SUCH AS CHURCH GROUPS, UNIONS, FRATERNAL OR ATHLETIC GROUPS, OR SCHOOL GROUPS?: YES

## 2025-05-20 SDOH — SOCIAL STABILITY: SOCIAL NETWORK: HOW OFTEN DO YOU GET TOGETHER WITH FRIENDS OR RELATIVES?: MORE THAN THREE TIMES A WEEK

## 2025-05-20 SDOH — ECONOMIC STABILITY: TRANSPORTATION INSECURITY: IN THE PAST 12 MONTHS, HAS LACK OF TRANSPORTATION KEPT YOU FROM MEDICAL APPOINTMENTS OR FROM GETTING MEDICATIONS?: NO

## 2025-05-20 SDOH — ECONOMIC STABILITY: FOOD INSECURITY: WITHIN THE PAST 12 MONTHS, THE FOOD YOU BOUGHT JUST DIDN'T LAST AND YOU DIDN'T HAVE MONEY TO GET MORE.: NEVER TRUE

## 2025-05-20 SDOH — HEALTH STABILITY: MENTAL HEALTH: HOW OFTEN DO YOU HAVE A DRINK CONTAINING ALCOHOL?: MONTHLY OR LESS

## 2025-05-20 SDOH — HEALTH STABILITY: PHYSICAL HEALTH: ON AVERAGE, HOW MANY DAYS PER WEEK DO YOU ENGAGE IN MODERATE TO STRENUOUS EXERCISE (LIKE A BRISK WALK)?: 0 DAYS

## 2025-05-20 SDOH — HEALTH STABILITY: PHYSICAL HEALTH: ON AVERAGE, HOW MANY MINUTES DO YOU ENGAGE IN EXERCISE AT THIS LEVEL?: 0 MIN

## 2025-05-20 SDOH — SOCIAL STABILITY: SOCIAL NETWORK: HOW OFTEN DO YOU ATTEND CHURCH OR RELIGIOUS SERVICES?: NEVER

## 2025-05-20 ASSESSMENT — ACTIVITIES OF DAILY LIVING (ADL): LACK_OF_TRANSPORTATION: NO

## 2025-05-20 NOTE — CARE COORDINATION
Ambulatory Care Coordination Note     2025 4:06 PM     Patient Current Location:  Home:  [facility] [address]     ACM contacted the patient by telephone. Verified name and  with patient as identifiers.         ACM: Linsey Booker RN     Challenges to be reviewed by the provider   Additional needs identified to be addressed with provider No                 Method of communication with provider: none.    Utilization: Patient has not had any utilization since our last call.     Care Summary Note: CC f/u completed.  Pt was pleasant and engaged with no s/s of disease exacerbation.  Pt taking medications as prescribed.   SDOH completed, no new needs identified.  Pt unable to wear compression hose r/t to swelling from recent Fx humeral neck, right, closed.  Pt spoke to surgery and is using lymphedema pumps more frequently.  Pt is to start therapy next week.  Family will transport.  Pt has no PRN ACM needs at this time.    Offered patient enrollment in the Remote Patient Monitoring (RPM) program for in-home monitoring: Yes, but did not enroll at this time: limited patient ability to navigate RPM/equipment.     Assessments Completed:   Hypertension - Encounter Level          ,   General Assessment    Do you have any symptoms that are causing concern?: Yes  Progression since Onset: Gradually Improving  Reported Symptoms: Pain (Comment: Fx humeral neck, right, closed)      ,   Social Drivers of Health     Tobacco Use: Medium Risk (2025)    Patient History     Smoking Tobacco Use: Former     Smokeless Tobacco Use: Never     Passive Exposure: Past   Alcohol Use: Not At Risk (2025)    AUDIT-C     Frequency of Alcohol Consumption: Monthly or less     Average Number of Drinks: 1 or 2     Frequency of Binge Drinking: Less than monthly   Financial Resource Strain: Low Risk  (2025)    Overall Financial Resource Strain (CARDIA)     Difficulty of Paying Living Expenses: Not hard at all   Food

## 2025-06-10 ENCOUNTER — CARE COORDINATION (OUTPATIENT)
Dept: CARE COORDINATION | Age: 80
End: 2025-06-10

## 2025-06-17 ENCOUNTER — CARE COORDINATION (OUTPATIENT)
Dept: CARE COORDINATION | Age: 80
End: 2025-06-17

## 2025-06-17 NOTE — CARE COORDINATION
Ambulatory Care Coordination Note     6/17/2025 2:16 PM     Patient outreach attempt by this ACM today to perform care management follow up . ACM was unable to reach the patient by telephone today;   left voice message requesting a return phone call to this ACM.     ACM: Linsey Booker RN     Care Summary Note: UTRx2    PCP/Specialist follow up:   Future Appointments         Provider Specialty Dept Phone    7/9/2025 10:15 AM Bear Bryson MD Internal Medicine [phone]    7/24/2025 9:45 AM Blaine Alcaraz MD Cardiology [phone]    8/29/2025 9:00 AM Lorenzo Willson MD Vascular Surgery [phone]    3/11/2026 9:00 AM Nabor Armstrong MD Pulmonology [phone]            Follow Up:   Plan for next ACM outreach in approximately 1 week to complete:  - SDOH assessments  - advance care planning  - goal progression  - education .

## 2025-06-24 ENCOUNTER — CARE COORDINATION (OUTPATIENT)
Dept: CARE COORDINATION | Age: 80
End: 2025-06-24

## 2025-06-24 NOTE — CARE COORDINATION
grab bars in bathroom  Use walking aids like cane or walker  Follow through on orders for PT    Barriers: fear of failure  Plan for overcoming my barriers: support and resources  Confidence: 8/10  Anticipated Goal Completion Date: 08/05/2025               PCP/Specialist follow up:   Future Appointments         Provider Specialty Dept Phone    7/9/2025 10:15 AM Bear Bryson MD Internal Medicine [phone]    7/24/2025 9:45 AM Blaine Alcaraz MD Cardiology [phone]    8/29/2025 9:00 AM Lorenzo Willson MD Vascular Surgery [phone]    3/11/2026 9:00 AM Nabor Armstrong MD Pulmonology [phone]            Follow Up:   Plan for next ACM outreach in approximately 3 weeks to complete:  - advance care planning   - goal progression  - education .   Patient  is agreeable to this plan.

## 2025-07-15 ENCOUNTER — CARE COORDINATION (OUTPATIENT)
Dept: CARE COORDINATION | Age: 80
End: 2025-07-15

## 2025-07-15 NOTE — CARE COORDINATION
Ambulatory Care Coordination Note     7/15/2025 3:18 PM     Patient outreach attempt by this ACM today to perform care management follow up . ACM was unable to reach the patient by telephone today;   left voice message requesting a return phone call to this ACM.     ACM: Linsey Booker RN     Care Summary Note: UTRx1    PCP/Specialist follow up:   Future Appointments         Provider Specialty Dept Phone    7/24/2025 9:45 AM Blaine Alcaraz MD Cardiology [phone]    8/6/2025 10:00 AM (Arrive by 9:30 AM) [facility] 1 Radiology [phone]    8/29/2025 9:00 AM Lorenzo Willson MD Vascular Surgery [phone]    1/9/2026 9:15 AM Bear Bryson MD Internal Medicine [phone]    3/11/2026 9:00 AM Nabor Armstrong MD Pulmonology [phone]            Follow Up:   Plan for next ACM outreach in approximately 3 weeks to complete:  - advance care planning.

## 2025-07-24 ENCOUNTER — OFFICE VISIT (OUTPATIENT)
Dept: CARDIOLOGY CLINIC | Age: 80
End: 2025-07-24
Payer: MEDICARE

## 2025-07-24 VITALS
OXYGEN SATURATION: 96 % | WEIGHT: 219 LBS | RESPIRATION RATE: 19 BRPM | BODY MASS INDEX: 43 KG/M2 | DIASTOLIC BLOOD PRESSURE: 74 MMHG | SYSTOLIC BLOOD PRESSURE: 132 MMHG | HEART RATE: 68 BPM | HEIGHT: 60 IN

## 2025-07-24 DIAGNOSIS — Q21.12 PFO (PATENT FORAMEN OVALE): ICD-10-CM

## 2025-07-24 DIAGNOSIS — I10 ESSENTIAL HYPERTENSION: ICD-10-CM

## 2025-07-24 DIAGNOSIS — I63.9 CEREBROVASCULAR ACCIDENT (CVA), UNSPECIFIED MECHANISM (HCC): ICD-10-CM

## 2025-07-24 DIAGNOSIS — I25.10 CORONARY ARTERY DISEASE INVOLVING NATIVE CORONARY ARTERY OF NATIVE HEART WITHOUT ANGINA PECTORIS: Primary | ICD-10-CM

## 2025-07-24 DIAGNOSIS — E78.2 MIXED HYPERLIPIDEMIA: ICD-10-CM

## 2025-07-24 PROCEDURE — G8417 CALC BMI ABV UP PARAM F/U: HCPCS | Performed by: INTERNAL MEDICINE

## 2025-07-24 PROCEDURE — 1090F PRES/ABSN URINE INCON ASSESS: CPT | Performed by: INTERNAL MEDICINE

## 2025-07-24 PROCEDURE — 1036F TOBACCO NON-USER: CPT | Performed by: INTERNAL MEDICINE

## 2025-07-24 PROCEDURE — 3078F DIAST BP <80 MM HG: CPT | Performed by: INTERNAL MEDICINE

## 2025-07-24 PROCEDURE — G8428 CUR MEDS NOT DOCUMENT: HCPCS | Performed by: INTERNAL MEDICINE

## 2025-07-24 PROCEDURE — 99214 OFFICE O/P EST MOD 30 MIN: CPT | Performed by: INTERNAL MEDICINE

## 2025-07-24 PROCEDURE — 3075F SYST BP GE 130 - 139MM HG: CPT | Performed by: INTERNAL MEDICINE

## 2025-07-24 PROCEDURE — G8399 PT W/DXA RESULTS DOCUMENT: HCPCS | Performed by: INTERNAL MEDICINE

## 2025-07-24 PROCEDURE — 1123F ACP DISCUSS/DSCN MKR DOCD: CPT | Performed by: INTERNAL MEDICINE

## 2025-07-24 RX ORDER — PRAVASTATIN SODIUM 20 MG
20 TABLET ORAL DAILY
Qty: 90 TABLET | Refills: 3 | Status: SHIPPED | OUTPATIENT
Start: 2025-07-24

## 2025-08-05 ENCOUNTER — CARE COORDINATION (OUTPATIENT)
Dept: CASE MANAGEMENT | Age: 80
End: 2025-08-05

## 2025-08-06 ENCOUNTER — HOSPITAL ENCOUNTER (OUTPATIENT)
Dept: WOMENS IMAGING | Age: 80
Discharge: HOME | End: 2025-08-06
Payer: MEDICARE

## 2025-08-06 VITALS — HEIGHT: 60 IN | WEIGHT: 220 LBS | BODY MASS INDEX: 43.19 KG/M2

## 2025-08-06 DIAGNOSIS — Z12.31 BREAST CANCER SCREENING BY MAMMOGRAM: ICD-10-CM

## 2025-08-06 PROCEDURE — 77063 BREAST TOMOSYNTHESIS BI: CPT

## 2025-08-08 ENCOUNTER — HOSPITAL ENCOUNTER (OUTPATIENT)
Dept: WOMENS IMAGING | Age: 80
Discharge: HOME | End: 2025-08-08
Attending: STUDENT IN AN ORGANIZED HEALTH CARE EDUCATION/TRAINING PROGRAM
Payer: MEDICARE

## 2025-08-08 DIAGNOSIS — Z78.0 POSTMENOPAUSAL: ICD-10-CM

## 2025-08-08 PROCEDURE — 77080 DXA BONE DENSITY AXIAL: CPT

## 2025-08-11 ENCOUNTER — HOSPITAL ENCOUNTER (OUTPATIENT)
Dept: NUCLEAR MEDICINE | Age: 80
Discharge: HOME | End: 2025-08-11
Attending: INTERNAL MEDICINE
Payer: MEDICARE

## 2025-08-11 ENCOUNTER — HOSPITAL ENCOUNTER (OUTPATIENT)
Age: 80
Discharge: HOME | End: 2025-08-13
Attending: INTERNAL MEDICINE
Payer: MEDICARE

## 2025-08-11 DIAGNOSIS — I25.10 CORONARY ARTERY DISEASE INVOLVING NATIVE CORONARY ARTERY OF NATIVE HEART WITHOUT ANGINA PECTORIS: ICD-10-CM

## 2025-08-11 PROCEDURE — 6360000002 HC RX W HCPCS: Performed by: INTERNAL MEDICINE

## 2025-08-11 PROCEDURE — 3430000000 HC RX DIAGNOSTIC RADIOPHARMACEUTICAL: Performed by: INTERNAL MEDICINE

## 2025-08-11 PROCEDURE — 78452 HT MUSCLE IMAGE SPECT MULT: CPT

## 2025-08-11 PROCEDURE — 93017 CV STRESS TEST TRACING ONLY: CPT

## 2025-08-11 PROCEDURE — A9502 TC99M TETROFOSMIN: HCPCS | Performed by: INTERNAL MEDICINE

## 2025-08-11 RX ORDER — REGADENOSON 0.08 MG/ML
0.4 INJECTION, SOLUTION INTRAVENOUS
Status: COMPLETED | OUTPATIENT
Start: 2025-08-11 | End: 2025-08-11

## 2025-08-11 RX ADMIN — REGADENOSON 0.4 MG: 0.08 INJECTION, SOLUTION INTRAVENOUS at 09:19

## 2025-08-11 RX ADMIN — TETROFOSMIN 33 MILLICURIE: 1.38 INJECTION, POWDER, LYOPHILIZED, FOR SOLUTION INTRAVENOUS at 09:22

## 2025-08-12 ENCOUNTER — HOSPITAL ENCOUNTER (OUTPATIENT)
Dept: NUCLEAR MEDICINE | Age: 80
Discharge: HOME | End: 2025-08-12
Attending: INTERNAL MEDICINE
Payer: MEDICARE

## 2025-08-12 LAB
STRESS BASELINE DIAS BP: 67 MMHG
STRESS BASELINE HR: 65 BPM
STRESS BASELINE SYS BP: 143 MMHG
STRESS ESTIMATED WORKLOAD: 1 METS
STRESS EXERCISE DUR MIN: 1 MIN
STRESS EXERCISE DUR SEC: 40 SEC
STRESS PEAK DIAS BP: 69 MMHG
STRESS PEAK SYS BP: 153 MMHG
STRESS PERCENT HR ACHIEVED: 66 %
STRESS POST PEAK HR: 92 BPM
STRESS RATE PRESSURE PRODUCT: NORMAL BPM*MMHG
STRESS ST DEPRESSION: 0 MM
STRESS TARGET HR: 139 BPM

## 2025-08-12 PROCEDURE — 93016 CV STRESS TEST SUPVJ ONLY: CPT | Performed by: INTERNAL MEDICINE

## 2025-08-12 PROCEDURE — 78452 HT MUSCLE IMAGE SPECT MULT: CPT | Performed by: INTERNAL MEDICINE

## 2025-08-12 PROCEDURE — 93018 CV STRESS TEST I&R ONLY: CPT | Performed by: INTERNAL MEDICINE

## 2025-08-12 PROCEDURE — 3430000000 HC RX DIAGNOSTIC RADIOPHARMACEUTICAL: Performed by: INTERNAL MEDICINE

## 2025-08-12 PROCEDURE — A9502 TC99M TETROFOSMIN: HCPCS | Performed by: INTERNAL MEDICINE

## 2025-08-12 RX ADMIN — TETROFOSMIN 30.6 MILLICURIE: 1.38 INJECTION, POWDER, LYOPHILIZED, FOR SOLUTION INTRAVENOUS at 09:05

## 2025-08-13 ENCOUNTER — CARE COORDINATION (OUTPATIENT)
Dept: CARE COORDINATION | Age: 80
End: 2025-08-13

## 2025-08-26 ENCOUNTER — CARE COORDINATION (OUTPATIENT)
Dept: CARE COORDINATION | Age: 80
End: 2025-08-26

## 2025-08-29 ENCOUNTER — OFFICE VISIT (OUTPATIENT)
Dept: VASCULAR SURGERY | Age: 80
End: 2025-08-29
Payer: MEDICARE

## 2025-08-29 VITALS — HEIGHT: 60 IN | WEIGHT: 219.6 LBS | BODY MASS INDEX: 43.11 KG/M2

## 2025-08-29 DIAGNOSIS — I87.2 VENOUS INSUFFICIENCY: ICD-10-CM

## 2025-08-29 DIAGNOSIS — I10 ESSENTIAL HYPERTENSION: ICD-10-CM

## 2025-08-29 DIAGNOSIS — I89.0 LYMPHEDEMA OF BOTH LOWER EXTREMITIES: Primary | ICD-10-CM

## 2025-08-29 DIAGNOSIS — Z86.73 HISTORY OF ISCHEMIC STROKE: ICD-10-CM

## 2025-08-29 DIAGNOSIS — E78.2 MIXED HYPERLIPIDEMIA: ICD-10-CM

## 2025-08-29 DIAGNOSIS — E66.813 OBESITY, CLASS III, BMI 40-49.9 (MORBID OBESITY) (HCC): ICD-10-CM

## 2025-08-29 PROCEDURE — 99214 OFFICE O/P EST MOD 30 MIN: CPT | Performed by: SURGERY

## 2025-08-29 PROCEDURE — 1036F TOBACCO NON-USER: CPT | Performed by: SURGERY

## 2025-08-29 PROCEDURE — 1123F ACP DISCUSS/DSCN MKR DOCD: CPT | Performed by: SURGERY

## 2025-08-29 PROCEDURE — G8399 PT W/DXA RESULTS DOCUMENT: HCPCS | Performed by: SURGERY

## 2025-08-29 PROCEDURE — 1159F MED LIST DOCD IN RCRD: CPT | Performed by: SURGERY

## 2025-08-29 PROCEDURE — 1090F PRES/ABSN URINE INCON ASSESS: CPT | Performed by: SURGERY

## 2025-08-29 PROCEDURE — G8417 CALC BMI ABV UP PARAM F/U: HCPCS | Performed by: SURGERY

## 2025-08-29 PROCEDURE — G8427 DOCREV CUR MEDS BY ELIG CLIN: HCPCS | Performed by: SURGERY

## 2025-08-29 RX ORDER — ACETAMINOPHEN 325 MG/1
650 TABLET ORAL EVERY 4 HOURS PRN
COMMUNITY

## 2025-08-29 RX ORDER — ALPRAZOLAM 0.5 MG
5 TABLET ORAL PRN
COMMUNITY

## 2025-08-29 ASSESSMENT — ENCOUNTER SYMPTOMS
ALLERGIC/IMMUNOLOGIC NEGATIVE: 1
EYES NEGATIVE: 1
RESPIRATORY NEGATIVE: 1
GASTROINTESTINAL NEGATIVE: 1
COLOR CHANGE: 1

## (undated) DEVICE — SNARE ENDOSCP L240CM LOOP W13MM DIA2.4MM SHT THROW SM OVL